# Patient Record
Sex: FEMALE | URBAN - METROPOLITAN AREA
[De-identification: names, ages, dates, MRNs, and addresses within clinical notes are randomized per-mention and may not be internally consistent; named-entity substitution may affect disease eponyms.]

---

## 2015-03-19 RX ORDER — INSULIN LISPRO 100/ML
14 VIAL (ML) SUBCUTANEOUS
Qty: 0 | Refills: 0 | DISCHARGE
Start: 2015-03-19

## 2017-09-07 ENCOUNTER — EMERGENCY (EMERGENCY)
Facility: HOSPITAL | Age: 37
LOS: 1 days | Discharge: HOME | End: 2017-09-07
Attending: EMERGENCY MEDICINE | Admitting: EMERGENCY MEDICINE
Payer: MEDICARE

## 2017-09-07 VITALS
TEMPERATURE: 99 F | RESPIRATION RATE: 17 BRPM | HEART RATE: 66 BPM | DIASTOLIC BLOOD PRESSURE: 72 MMHG | SYSTOLIC BLOOD PRESSURE: 158 MMHG | OXYGEN SATURATION: 98 %

## 2017-09-07 VITALS
SYSTOLIC BLOOD PRESSURE: 162 MMHG | DIASTOLIC BLOOD PRESSURE: 92 MMHG | OXYGEN SATURATION: 97 % | RESPIRATION RATE: 18 BRPM | HEART RATE: 69 BPM

## 2017-09-07 DIAGNOSIS — H40.9 UNSPECIFIED GLAUCOMA: Chronic | ICD-10-CM

## 2017-09-07 DIAGNOSIS — Z98.89 OTHER SPECIFIED POSTPROCEDURAL STATES: Chronic | ICD-10-CM

## 2017-09-07 DIAGNOSIS — L03.113 CELLULITIS OF RIGHT UPPER LIMB: Chronic | ICD-10-CM

## 2017-09-07 DIAGNOSIS — M51.26 OTHER INTERVERTEBRAL DISC DISPLACEMENT, LUMBAR REGION: Chronic | ICD-10-CM

## 2017-09-07 LAB
ALBUMIN SERPL ELPH-MCNC: 3.9 G/DL — SIGNIFICANT CHANGE UP (ref 3.3–5)
ALP SERPL-CCNC: 79 U/L — SIGNIFICANT CHANGE UP (ref 40–120)
ALT FLD-CCNC: 14 U/L RC — SIGNIFICANT CHANGE UP (ref 10–45)
ANION GAP SERPL CALC-SCNC: 17 MMOL/L — SIGNIFICANT CHANGE UP (ref 5–17)
APTT BLD: 43.3 SEC — HIGH (ref 27.5–37.4)
AST SERPL-CCNC: 24 U/L — SIGNIFICANT CHANGE UP (ref 10–40)
BASOPHILS # BLD AUTO: 0 K/UL — SIGNIFICANT CHANGE UP (ref 0–0.2)
BASOPHILS NFR BLD AUTO: 0.6 % — SIGNIFICANT CHANGE UP (ref 0–2)
BILIRUB SERPL-MCNC: 0.2 MG/DL — SIGNIFICANT CHANGE UP (ref 0.2–1.2)
BLD GP AB SCN SERPL QL: NEGATIVE — SIGNIFICANT CHANGE UP
BUN SERPL-MCNC: 41 MG/DL — HIGH (ref 7–23)
CALCIUM SERPL-MCNC: 8.2 MG/DL — LOW (ref 8.4–10.5)
CHLORIDE SERPL-SCNC: 95 MMOL/L — LOW (ref 96–108)
CO2 SERPL-SCNC: 24 MMOL/L — SIGNIFICANT CHANGE UP (ref 22–31)
CREAT SERPL-MCNC: 4.88 MG/DL — HIGH (ref 0.5–1.3)
EOSINOPHIL # BLD AUTO: 0.1 K/UL — SIGNIFICANT CHANGE UP (ref 0–0.5)
EOSINOPHIL NFR BLD AUTO: 0.8 % — SIGNIFICANT CHANGE UP (ref 0–6)
GLUCOSE SERPL-MCNC: 113 MG/DL — HIGH (ref 70–99)
HCT VFR BLD CALC: 29.8 % — LOW (ref 34.5–45)
HGB BLD-MCNC: 10.1 G/DL — LOW (ref 11.5–15.5)
INR BLD: 1.21 RATIO — HIGH (ref 0.88–1.16)
LYMPHOCYTES # BLD AUTO: 1.2 K/UL — SIGNIFICANT CHANGE UP (ref 1–3.3)
LYMPHOCYTES # BLD AUTO: 16.9 % — SIGNIFICANT CHANGE UP (ref 13–44)
MCHC RBC-ENTMCNC: 30.8 PG — SIGNIFICANT CHANGE UP (ref 27–34)
MCHC RBC-ENTMCNC: 33.8 GM/DL — SIGNIFICANT CHANGE UP (ref 32–36)
MCV RBC AUTO: 91.2 FL — SIGNIFICANT CHANGE UP (ref 80–100)
MONOCYTES # BLD AUTO: 0.4 K/UL — SIGNIFICANT CHANGE UP (ref 0–0.9)
MONOCYTES NFR BLD AUTO: 6.5 % — SIGNIFICANT CHANGE UP (ref 2–14)
NEUTROPHILS # BLD AUTO: 5.1 K/UL — SIGNIFICANT CHANGE UP (ref 1.8–7.4)
NEUTROPHILS NFR BLD AUTO: 75.1 % — SIGNIFICANT CHANGE UP (ref 43–77)
PLATELET # BLD AUTO: 182 K/UL — SIGNIFICANT CHANGE UP (ref 150–400)
POTASSIUM SERPL-MCNC: 6 MMOL/L — HIGH (ref 3.5–5.3)
POTASSIUM SERPL-SCNC: 6 MMOL/L — HIGH (ref 3.5–5.3)
PROT SERPL-MCNC: 7.8 G/DL — SIGNIFICANT CHANGE UP (ref 6–8.3)
PROTHROM AB SERPL-ACNC: 13.1 SEC — HIGH (ref 9.8–12.7)
RBC # BLD: 3.27 M/UL — LOW (ref 3.8–5.2)
RBC # FLD: 14.8 % — HIGH (ref 10.3–14.5)
RH IG SCN BLD-IMP: POSITIVE — SIGNIFICANT CHANGE UP
SODIUM SERPL-SCNC: 136 MMOL/L — SIGNIFICANT CHANGE UP (ref 135–145)
WBC # BLD: 6.8 K/UL — SIGNIFICANT CHANGE UP (ref 3.8–10.5)
WBC # FLD AUTO: 6.8 K/UL — SIGNIFICANT CHANGE UP (ref 3.8–10.5)

## 2017-09-07 PROCEDURE — 99284 EMERGENCY DEPT VISIT MOD MDM: CPT | Mod: GC

## 2017-09-07 PROCEDURE — 70450 CT HEAD/BRAIN W/O DYE: CPT | Mod: 26

## 2017-09-07 RX ORDER — LATANOPROST 0.05 MG/ML
1 SOLUTION/ DROPS OPHTHALMIC; TOPICAL AT BEDTIME
Refills: 0 | Status: DISCONTINUED | OUTPATIENT
Start: 2017-09-07 | End: 2017-09-11

## 2017-09-07 RX ORDER — BRIMONIDINE TARTRATE 2 MG/MG
1 SOLUTION/ DROPS OPHTHALMIC ONCE
Refills: 0 | Status: COMPLETED | OUTPATIENT
Start: 2017-09-07 | End: 2017-09-07

## 2017-09-07 RX ORDER — TIMOLOL 0.5 %
1 DROPS OPHTHALMIC (EYE) ONCE
Refills: 0 | Status: DISCONTINUED | OUTPATIENT
Start: 2017-09-07 | End: 2017-09-07

## 2017-09-07 RX ORDER — DORZOLAMIDE HYDROCHLORIDE TIMOLOL MALEATE 20; 5 MG/ML; MG/ML
1 SOLUTION/ DROPS OPHTHALMIC ONCE
Refills: 0 | Status: COMPLETED | OUTPATIENT
Start: 2017-09-07 | End: 2017-09-07

## 2017-09-07 RX ADMIN — DORZOLAMIDE HYDROCHLORIDE TIMOLOL MALEATE 1 DROP(S): 20; 5 SOLUTION/ DROPS OPHTHALMIC at 19:29

## 2017-09-07 RX ADMIN — BRIMONIDINE TARTRATE 1 DROP(S): 2 SOLUTION/ DROPS OPHTHALMIC at 19:29

## 2017-09-07 RX ADMIN — LATANOPROST 1 DROP(S): 0.05 SOLUTION/ DROPS OPHTHALMIC; TOPICAL at 19:30

## 2017-09-07 NOTE — ED ADULT NURSE REASSESSMENT NOTE - NS ED NURSE REASSESS COMMENT FT1
Spoke with  Lena about patient's transport back to her group home. She called the group home to alert them pt will be returning and there is a nonemergent ambulance coming for transport in approx 2 hours. Pt is resting in stretcher on her phone in NAD

## 2017-09-07 NOTE — ED PROVIDER NOTE - MEDICAL DECISION MAKING DETAILS
Attending MD Mcmahan: 37 year old, history of diabetes, esrd on dialysis, glaucoma. woke this morning with darkness in her upper field of vision. describes the sensation and "seeing blood dripping down her left eye. history of blind in right eye from glaucoma. denies severe eye pain, injection, or double  vision. denies additional neuro complaints.   Blind in left eye, peripheral vision intact right eye 20/200.  No pain with EOM.  Plan:  rule out vitreous hemorrhage vs. retinal detachment. Optho consult.

## 2017-09-07 NOTE — ED ADULT NURSE NOTE - PMH
Abscess    Anemia    Back pain    CKD (chronic kidney disease)  creat ~2 as per pt  Diabetes    Glaucoma    Hypertension

## 2017-09-07 NOTE — ED PROVIDER NOTE - ATTENDING CONTRIBUTION TO CARE
Attending MD Mcmahan:  I personally have seen and examined this patient.  Resident note reviewed and agree on plan of care and except where noted.  See MDM for details.

## 2017-09-07 NOTE — ED PROVIDER NOTE - PHYSICAL EXAMINATION
Meri: A & O x 3, NAD, HEENT with right eye pressure 64, right eye vision for light only, left eye pressure 23 and vision for shapes only, visual fields intact on exam; lungs CTAB, heart with reg rhythm; abdomen soft NTND; extremities with left leg amputation and staples still in skin; skin with no rashes, neuro exam cn2 as described above, otherwise non focal with no motor or sensory deficits

## 2017-09-07 NOTE — CONSULT NOTE ADULT - ASSESSMENT
A/P: pending        Follow-Up:  Patient should follow up his/her ophthalmologist or in the [facility] within 1 week of discharge.  [address] [place] [address]  [phone]    S/D/W Dr Murray (attending) A/P: 37 F   #Vitreous hemorrhage OS likely 2/2 diabetic retinopathy vs old vascular occlusion. No tear/RD on exam and Bscan.  - HOB elevation, limit activity  - BP/BG control  - f/u clinic tomorrow    #NLP OD 2/2 glc   - denies pain  - monitor    Follow-Up:  Patient should follow up his/her ophthalmologist or in the [facility] Op[facility]mology Practice tomorrow  [address] [place] [address]  [phone]

## 2017-09-07 NOTE — ED PROVIDER NOTE - OBJECTIVE STATEMENT
37 year old, history of diabetes, esrd on dialysis, glaucoma. woke this morning with darkness in her upper field of vision. 37 year old, history of diabetes, esrd on dialysis, glaucoma. woke this morning with darkness in her upper field of vision. describes the sensation and "seeing blood dripping down her left eye. history of blind in right eye from glaucoma. denies severe eye pain, injection, or double  vision. denies additional neuro complaints    doesn't remember her Ophthalmologist name

## 2017-09-07 NOTE — ED ADULT NURSE NOTE - OBJECTIVE STATEMENT
pt biba from [facility] in [place] with c/o partial left eye vision loss with burning pain that began yesterday.  pt is a diabetic and has retinopathy causing blindness in her right eye.  pt is also a left BKA associated with phantom pain.  pt is awake, alert and responsive to all stimuli.  no sob or respiratory distress noted.  pt resting in bed awaiting md mendez.  will continue to monitor.

## 2017-09-07 NOTE — ED ADULT NURSE NOTE - PSH
Abscess of arm, right  and abscess of mons pubis, s/p I and D  Glaucoma    Herniated lumbar intervertebral disc    History of

## 2017-09-07 NOTE — ED PROVIDER NOTE - PROGRESS NOTE DETAILS
Optho evaluted pt and found her to have vitreous hemorhage. Recommended DC, HOB elevation instructions, pt will f/u in optho clinic tomorrow.

## 2017-09-07 NOTE — ED ADULT NURSE REASSESSMENT NOTE - NS ED NURSE REASSESS COMMENT FT1
Pt. is back from CT. Pt. is resting on stretcher, listening to music. Easy work of breathing. VSS. Safety maintained. Will continue to monitor.

## 2017-09-07 NOTE — CONSULT NOTE ADULT - SUBJECTIVE AND OBJECTIVE BOX
[facility] Ophthalmology Consult Note    HPI: 37 F hx of DM, HTN, ESRD, poor vision for many years OD 2/2 glc s/p tube. Presents with "drops of blood" in her vision in the left eye since this morning. Denies pain. Denies any recent changes in right eye.       PMH: DM, HTN, ESRD  Meds: Reviewed  POcHx (including surgeries/lasers/trauma): Severe glc OD w/ tube, long standing decreased vision OD  Drops: None  FamHx: None  Social Hx: None  Allergies: NKDA    ROS:  General (neg), Vision (per HPI), Head and Neck (neg), Pulm (neg), CV (neg), GI (neg),  (neg), Musculoskeletal (neg), Skin/Integ (neg), Neuro (neg), Endocrine (neg), Heme (neg), All/Immuno (neg)    Mood and Affect Appropriate ( x ),  Oriented to Time, Place, and Person x 3 ( x )    Ophthalmology Exam    Visual acuity (sc): NLP OD, 20/200 OS PHNI  Pupils: +APD OD, R+R OS  Ttono: 42 OD, 21 OS  Extraocular movements (EOMs): Full OU, no pain, no diplopia   Confrontational Visual Field (CVF):  unable OD, Full OS    Pen Light Exam (PLE)  External:  Flat OU  Lids/Lashes/Lacrimal Ducts: Flat OU    Sclera/Conjunctiva:  shunt sup-temporally OD, W+Q OS  Cornea: Cl OU, no edema  Anterior Chamber: tube in place OD, D+F OS, no hyphema  Iris:  Flat OU, no NVI OU  Lens:  NS OD, cl OS    Fundus Exam: dilated with 1% tropicamide and 2.5% phenylephrine  Approval obtained from primary team for dilation  Patient aware that pupils can remained dilated for at least 4-6 hours  Exam performed with 20D lens    Vitreous: wnl OU  Disc, cup/disc: sharp and pink, 0.4 OU  Macula:  wnl OU  Vessels:  wnl OU  Periphery: wnl OU [facility] Ophthalmology Consult Note    HPI: 37 F hx of DM, HTN, ESRD, poor vision for many years OD 2/2 glc s/p tube. Presents with "drops of blood" in her vision in the left eye since this morning. Denies pain. Denies any recent changes in right eye.       PMH: DM, HTN, ESRD  Meds: Reviewed  POcHx (including surgeries/lasers/trauma): Severe glc OD w/ tube, long standing decreased vision OD  Drops: None  FamHx: None  Social Hx: None  Allergies: NKDA    ROS:  General (neg), Vision (per HPI), Head and Neck (neg), Pulm (neg), CV (neg), GI (neg),  (neg), Musculoskeletal (neg), Skin/Integ (neg), Neuro (neg), Endocrine (neg), Heme (neg), All/Immuno (neg)    Mood and Affect Appropriate ( x ),  Oriented to Time, Place, and Person x 3 ( x )    Ophthalmology Exam    Visual acuity (sc): NLP OD, 20/200 OS PHNI  Pupils: +APD OD, R+R OS  Ttono: 42 OD, 21 OS  Extraocular movements (EOMs): Full OU, no pain, no diplopia   Confrontational Visual Field (CVF):  unable OD, Full OS    Pen Light Exam (PLE)  External:  Flat OU  Lids/Lashes/Lacrimal Ducts: Flat OU    Sclera/Conjunctiva:  shunt sup-temporally OD, W+Q OS  Cornea: Cl OU, no edema  Anterior Chamber: tube in place OD, D+F OS, no hyphema  Iris:  Flat OU, no NVI OU  Lens:  NS OD, cl OS    Fundus Exam: dilated with 1% tropicamide and 2.5% phenylephrine  Approval obtained from primary team for dilation  Patient aware that pupils can remained dilated for at least 4-6 hours  Exam performed with 20D lens    Vitreous: wnl OD, vitreous hemorrhage OS  Disc, cup/disc: pale nerve OD, pink and sharp OS  Macula:  flat OD, obscured by preretinal hemorrhage  Vessels:  wnl OD, sclerotic appearing vessel inf-nasal OS  Periphery: fibrosis extending from nerve OD, scattered dbh's OS, no obvious tear/RD    Bscan OS: preretinal heme inferiorly, no RD

## 2017-09-08 ENCOUNTER — APPOINTMENT (OUTPATIENT)
Dept: OPHTHALMOLOGY | Facility: CLINIC | Age: 37
End: 2017-09-08

## 2017-12-08 ENCOUNTER — APPOINTMENT (OUTPATIENT)
Dept: OPHTHALMOLOGY | Facility: CLINIC | Age: 37
End: 2017-12-08

## 2018-01-22 ENCOUNTER — APPOINTMENT (OUTPATIENT)
Dept: OPHTHALMOLOGY | Facility: CLINIC | Age: 38
End: 2018-01-22

## 2018-02-23 ENCOUNTER — APPOINTMENT (OUTPATIENT)
Dept: OPHTHALMOLOGY | Facility: CLINIC | Age: 38
End: 2018-02-23

## 2018-03-26 ENCOUNTER — INPATIENT (INPATIENT)
Facility: HOSPITAL | Age: 38
LOS: 0 days | Discharge: HOME | End: 2018-03-27
Attending: INTERNAL MEDICINE | Admitting: INTERNAL MEDICINE
Payer: MEDICARE

## 2018-03-26 VITALS
DIASTOLIC BLOOD PRESSURE: 123 MMHG | RESPIRATION RATE: 18 BRPM | HEART RATE: 80 BPM | TEMPERATURE: 98 F | OXYGEN SATURATION: 100 % | SYSTOLIC BLOOD PRESSURE: 218 MMHG

## 2018-03-26 DIAGNOSIS — I10 ESSENTIAL (PRIMARY) HYPERTENSION: ICD-10-CM

## 2018-03-26 DIAGNOSIS — M51.26 OTHER INTERVERTEBRAL DISC DISPLACEMENT, LUMBAR REGION: Chronic | ICD-10-CM

## 2018-03-26 DIAGNOSIS — N18.6 END STAGE RENAL DISEASE: ICD-10-CM

## 2018-03-26 DIAGNOSIS — E87.5 HYPERKALEMIA: ICD-10-CM

## 2018-03-26 DIAGNOSIS — N19 UNSPECIFIED KIDNEY FAILURE: ICD-10-CM

## 2018-03-26 DIAGNOSIS — H40.9 UNSPECIFIED GLAUCOMA: Chronic | ICD-10-CM

## 2018-03-26 DIAGNOSIS — Z98.89 OTHER SPECIFIED POSTPROCEDURAL STATES: Chronic | ICD-10-CM

## 2018-03-26 DIAGNOSIS — E83.39 OTHER DISORDERS OF PHOSPHORUS METABOLISM: ICD-10-CM

## 2018-03-26 DIAGNOSIS — L03.113 CELLULITIS OF RIGHT UPPER LIMB: Chronic | ICD-10-CM

## 2018-03-26 LAB
ALBUMIN SERPL ELPH-MCNC: 4.2 G/DL — SIGNIFICANT CHANGE UP (ref 3.3–5)
ALP SERPL-CCNC: 86 U/L — SIGNIFICANT CHANGE UP (ref 40–120)
ALT FLD-CCNC: 13 U/L — SIGNIFICANT CHANGE UP (ref 4–33)
AMMONIA BLD-MCNC: 25 UMOL/L — SIGNIFICANT CHANGE UP (ref 11–55)
APTT BLD: 41.3 SEC — HIGH (ref 27.5–37.4)
AST SERPL-CCNC: 12 U/L — SIGNIFICANT CHANGE UP (ref 4–32)
BASE EXCESS BLDV CALC-SCNC: -1 MMOL/L — SIGNIFICANT CHANGE UP
BASE EXCESS BLDV CALC-SCNC: 0.7 MMOL/L — SIGNIFICANT CHANGE UP
BASOPHILS # BLD AUTO: 0.04 K/UL — SIGNIFICANT CHANGE UP (ref 0–0.2)
BASOPHILS NFR BLD AUTO: 0.3 % — SIGNIFICANT CHANGE UP (ref 0–2)
BILIRUB SERPL-MCNC: 0.3 MG/DL — SIGNIFICANT CHANGE UP (ref 0.2–1.2)
BLOOD GAS VENOUS - CREATININE: 10.4 MG/DL — HIGH (ref 0.5–1.3)
BLOOD GAS VENOUS - CREATININE: 9.84 MG/DL — HIGH (ref 0.5–1.3)
BUN SERPL-MCNC: 41 MG/DL — HIGH (ref 7–23)
BUN SERPL-MCNC: 90 MG/DL — HIGH (ref 7–23)
CALCIUM SERPL-MCNC: 7.9 MG/DL — LOW (ref 8.4–10.5)
CALCIUM SERPL-MCNC: 8.2 MG/DL — LOW (ref 8.4–10.5)
CHLORIDE BLDV-SCNC: 94 MMOL/L — LOW (ref 96–108)
CHLORIDE BLDV-SCNC: 96 MMOL/L — SIGNIFICANT CHANGE UP (ref 96–108)
CHLORIDE SERPL-SCNC: 90 MMOL/L — LOW (ref 98–107)
CHLORIDE SERPL-SCNC: 91 MMOL/L — LOW (ref 98–107)
CK MB BLD-MCNC: 2.4 — SIGNIFICANT CHANGE UP (ref 0–2.5)
CK MB BLD-MCNC: 3.93 NG/ML — SIGNIFICANT CHANGE UP (ref 1–4.7)
CK MB BLD-MCNC: 4.62 NG/ML — SIGNIFICANT CHANGE UP (ref 1–4.7)
CK SERPL-CCNC: 166 U/L — SIGNIFICANT CHANGE UP (ref 25–170)
CK SERPL-CCNC: 217 U/L — HIGH (ref 25–170)
CO2 SERPL-SCNC: 22 MMOL/L — SIGNIFICANT CHANGE UP (ref 22–31)
CO2 SERPL-SCNC: 28 MMOL/L — SIGNIFICANT CHANGE UP (ref 22–31)
CREAT SERPL-MCNC: 5.37 MG/DL — HIGH (ref 0.5–1.3)
CREAT SERPL-MCNC: 9.66 MG/DL — HIGH (ref 0.5–1.3)
EOSINOPHIL # BLD AUTO: 0.18 K/UL — SIGNIFICANT CHANGE UP (ref 0–0.5)
EOSINOPHIL NFR BLD AUTO: 1.4 % — SIGNIFICANT CHANGE UP (ref 0–6)
GAS PNL BLDV: 134 MMOL/L — LOW (ref 136–146)
GAS PNL BLDV: 135 MMOL/L — LOW (ref 136–146)
GLUCOSE BLDV-MCNC: 191 — HIGH (ref 70–99)
GLUCOSE BLDV-MCNC: 192 — HIGH (ref 70–99)
GLUCOSE SERPL-MCNC: 189 MG/DL — HIGH (ref 70–99)
GLUCOSE SERPL-MCNC: 211 MG/DL — HIGH (ref 70–99)
HBV SURFACE AG SER-ACNC: NEGATIVE — SIGNIFICANT CHANGE UP
HBV SURFACE AG SER-ACNC: NEGATIVE — SIGNIFICANT CHANGE UP
HCG SERPL-ACNC: < 5 MIU/ML — SIGNIFICANT CHANGE UP
HCO3 BLDV-SCNC: 22 MMOL/L — SIGNIFICANT CHANGE UP (ref 20–27)
HCO3 BLDV-SCNC: 23 MMOL/L — SIGNIFICANT CHANGE UP (ref 20–27)
HCT VFR BLD CALC: 36.3 % — SIGNIFICANT CHANGE UP (ref 34.5–45)
HCT VFR BLDV CALC: 35.4 % — SIGNIFICANT CHANGE UP (ref 34.5–45)
HCT VFR BLDV CALC: 44.9 % — SIGNIFICANT CHANGE UP (ref 34.5–45)
HGB BLD-MCNC: 11.7 G/DL — SIGNIFICANT CHANGE UP (ref 11.5–15.5)
HGB BLDV-MCNC: 11.5 G/DL — SIGNIFICANT CHANGE UP (ref 11.5–15.5)
HGB BLDV-MCNC: 14.6 G/DL — SIGNIFICANT CHANGE UP (ref 11.5–15.5)
IMM GRANULOCYTES # BLD AUTO: 0.04 # — SIGNIFICANT CHANGE UP
IMM GRANULOCYTES NFR BLD AUTO: 0.3 % — SIGNIFICANT CHANGE UP (ref 0–1.5)
INR BLD: 1.09 — SIGNIFICANT CHANGE UP (ref 0.88–1.17)
LACTATE BLDV-MCNC: 1.4 MMOL/L — SIGNIFICANT CHANGE UP (ref 0.5–2)
LACTATE BLDV-MCNC: 1.8 MMOL/L — SIGNIFICANT CHANGE UP (ref 0.5–2)
LYMPHOCYTES # BLD AUTO: 1.42 K/UL — SIGNIFICANT CHANGE UP (ref 1–3.3)
LYMPHOCYTES # BLD AUTO: 11.1 % — LOW (ref 13–44)
MAGNESIUM SERPL-MCNC: 2.3 MG/DL — SIGNIFICANT CHANGE UP (ref 1.6–2.6)
MCHC RBC-ENTMCNC: 29.1 PG — SIGNIFICANT CHANGE UP (ref 27–34)
MCHC RBC-ENTMCNC: 32.2 % — SIGNIFICANT CHANGE UP (ref 32–36)
MCV RBC AUTO: 90.3 FL — SIGNIFICANT CHANGE UP (ref 80–100)
MONOCYTES # BLD AUTO: 0.38 K/UL — SIGNIFICANT CHANGE UP (ref 0–0.9)
MONOCYTES NFR BLD AUTO: 3 % — SIGNIFICANT CHANGE UP (ref 2–14)
NEUTROPHILS # BLD AUTO: 10.71 K/UL — HIGH (ref 1.8–7.4)
NEUTROPHILS NFR BLD AUTO: 83.9 % — HIGH (ref 43–77)
NRBC # FLD: 0 — SIGNIFICANT CHANGE UP
PCO2 BLDV: 55 MMHG — HIGH (ref 41–51)
PCO2 BLDV: 57 MMHG — HIGH (ref 41–51)
PH BLDV: 7.28 PH — LOW (ref 7.32–7.43)
PH BLDV: 7.29 PH — LOW (ref 7.32–7.43)
PHOSPHATE SERPL-MCNC: 8.4 MG/DL — HIGH (ref 2.5–4.5)
PLATELET # BLD AUTO: 232 K/UL — SIGNIFICANT CHANGE UP (ref 150–400)
PMV BLD: 9.8 FL — SIGNIFICANT CHANGE UP (ref 7–13)
PO2 BLDV: 35 MMHG — SIGNIFICANT CHANGE UP (ref 35–40)
PO2 BLDV: 37 MMHG — SIGNIFICANT CHANGE UP (ref 35–40)
POTASSIUM BLDV-SCNC: 6.8 MMOL/L — CRITICAL HIGH (ref 3.4–4.5)
POTASSIUM BLDV-SCNC: 7.7 MMOL/L — CRITICAL HIGH (ref 3.4–4.5)
POTASSIUM SERPL-MCNC: 4 MMOL/L — SIGNIFICANT CHANGE UP (ref 3.5–5.3)
POTASSIUM SERPL-MCNC: 7.2 MMOL/L — CRITICAL HIGH (ref 3.5–5.3)
POTASSIUM SERPL-SCNC: 4 MMOL/L — SIGNIFICANT CHANGE UP (ref 3.5–5.3)
POTASSIUM SERPL-SCNC: 7.2 MMOL/L — CRITICAL HIGH (ref 3.5–5.3)
PROT SERPL-MCNC: 8 G/DL — SIGNIFICANT CHANGE UP (ref 6–8.3)
PROTHROM AB SERPL-ACNC: 12.5 SEC — SIGNIFICANT CHANGE UP (ref 9.8–13.1)
RBC # BLD: 4.02 M/UL — SIGNIFICANT CHANGE UP (ref 3.8–5.2)
RBC # FLD: 13.7 % — SIGNIFICANT CHANGE UP (ref 10.3–14.5)
SAO2 % BLDV: 55.2 % — LOW (ref 60–85)
SAO2 % BLDV: 57.7 % — LOW (ref 60–85)
SODIUM SERPL-SCNC: 135 MMOL/L — SIGNIFICANT CHANGE UP (ref 135–145)
SODIUM SERPL-SCNC: 135 MMOL/L — SIGNIFICANT CHANGE UP (ref 135–145)
TROPONIN T SERPL-MCNC: 0.15 NG/ML — HIGH (ref 0–0.06)
TROPONIN T SERPL-MCNC: 0.15 NG/ML — HIGH (ref 0–0.06)
WBC # BLD: 12.77 K/UL — HIGH (ref 3.8–10.5)
WBC # FLD AUTO: 12.77 K/UL — HIGH (ref 3.8–10.5)

## 2018-03-26 PROCEDURE — 99222 1ST HOSP IP/OBS MODERATE 55: CPT | Mod: GC

## 2018-03-26 PROCEDURE — 99291 CRITICAL CARE FIRST HOUR: CPT

## 2018-03-26 PROCEDURE — 70450 CT HEAD/BRAIN W/O DYE: CPT | Mod: 26

## 2018-03-26 PROCEDURE — 71045 X-RAY EXAM CHEST 1 VIEW: CPT | Mod: 26

## 2018-03-26 RX ORDER — CHLORHEXIDINE GLUCONATE 213 G/1000ML
1 SOLUTION TOPICAL
Refills: 0 | Status: DISCONTINUED | OUTPATIENT
Start: 2018-03-26 | End: 2018-03-27

## 2018-03-26 RX ORDER — DEXTROSE 50 % IN WATER 50 %
1 SYRINGE (ML) INTRAVENOUS ONCE
Refills: 0 | Status: DISCONTINUED | OUTPATIENT
Start: 2018-03-26 | End: 2018-03-27

## 2018-03-26 RX ORDER — OXYCODONE HYDROCHLORIDE 5 MG/1
30 TABLET ORAL ONCE
Refills: 0 | Status: DISCONTINUED | OUTPATIENT
Start: 2018-03-26 | End: 2018-03-26

## 2018-03-26 RX ORDER — ACETAMINOPHEN 500 MG
650 TABLET ORAL ONCE
Refills: 0 | Status: DISCONTINUED | OUTPATIENT
Start: 2018-03-26 | End: 2018-03-26

## 2018-03-26 RX ORDER — ATORVASTATIN CALCIUM 80 MG/1
20 TABLET, FILM COATED ORAL AT BEDTIME
Refills: 0 | Status: DISCONTINUED | OUTPATIENT
Start: 2018-03-26 | End: 2018-03-27

## 2018-03-26 RX ORDER — SODIUM CHLORIDE 9 MG/ML
1000 INJECTION, SOLUTION INTRAVENOUS
Refills: 0 | Status: DISCONTINUED | OUTPATIENT
Start: 2018-03-26 | End: 2018-03-27

## 2018-03-26 RX ORDER — OXCARBAZEPINE 300 MG/1
300 TABLET, FILM COATED ORAL
Refills: 0 | Status: DISCONTINUED | OUTPATIENT
Start: 2018-03-26 | End: 2018-03-27

## 2018-03-26 RX ORDER — INSULIN HUMAN 100 [IU]/ML
5 INJECTION, SOLUTION SUBCUTANEOUS ONCE
Refills: 0 | Status: COMPLETED | OUTPATIENT
Start: 2018-03-26 | End: 2018-03-26

## 2018-03-26 RX ORDER — AMLODIPINE BESYLATE 2.5 MG/1
10 TABLET ORAL DAILY
Refills: 0 | Status: DISCONTINUED | OUTPATIENT
Start: 2018-03-26 | End: 2018-03-27

## 2018-03-26 RX ORDER — SODIUM POLYSTYRENE SULFONATE 4.1 MEQ/G
30 POWDER, FOR SUSPENSION ORAL ONCE
Refills: 0 | Status: COMPLETED | OUTPATIENT
Start: 2018-03-26 | End: 2018-03-26

## 2018-03-26 RX ORDER — MIDAZOLAM HYDROCHLORIDE 1 MG/ML
2 INJECTION, SOLUTION INTRAMUSCULAR; INTRAVENOUS ONCE
Refills: 0 | Status: DISCONTINUED | OUTPATIENT
Start: 2018-03-26 | End: 2018-03-26

## 2018-03-26 RX ORDER — CALCIUM ACETATE 667 MG
2001 TABLET ORAL
Refills: 0 | Status: DISCONTINUED | OUTPATIENT
Start: 2018-03-26 | End: 2018-03-27

## 2018-03-26 RX ORDER — DEXTROSE 50 % IN WATER 50 %
50 SYRINGE (ML) INTRAVENOUS ONCE
Refills: 0 | Status: COMPLETED | OUTPATIENT
Start: 2018-03-26 | End: 2018-03-26

## 2018-03-26 RX ORDER — DEXTROSE 50 % IN WATER 50 %
25 SYRINGE (ML) INTRAVENOUS ONCE
Refills: 0 | Status: DISCONTINUED | OUTPATIENT
Start: 2018-03-26 | End: 2018-03-27

## 2018-03-26 RX ORDER — DEXTROSE 50 % IN WATER 50 %
12.5 SYRINGE (ML) INTRAVENOUS ONCE
Refills: 0 | Status: DISCONTINUED | OUTPATIENT
Start: 2018-03-26 | End: 2018-03-27

## 2018-03-26 RX ORDER — SODIUM BICARBONATE 1 MEQ/ML
50 SYRINGE (ML) INTRAVENOUS ONCE
Refills: 0 | Status: COMPLETED | OUTPATIENT
Start: 2018-03-26 | End: 2018-03-26

## 2018-03-26 RX ORDER — HEPARIN SODIUM 5000 [USP'U]/ML
5000 INJECTION INTRAVENOUS; SUBCUTANEOUS EVERY 8 HOURS
Refills: 0 | Status: DISCONTINUED | OUTPATIENT
Start: 2018-03-26 | End: 2018-03-27

## 2018-03-26 RX ORDER — GLUCAGON INJECTION, SOLUTION 0.5 MG/.1ML
1 INJECTION, SOLUTION SUBCUTANEOUS ONCE
Refills: 0 | Status: DISCONTINUED | OUTPATIENT
Start: 2018-03-26 | End: 2018-03-27

## 2018-03-26 RX ORDER — INSULIN LISPRO 100/ML
VIAL (ML) SUBCUTANEOUS EVERY 6 HOURS
Refills: 0 | Status: DISCONTINUED | OUTPATIENT
Start: 2018-03-26 | End: 2018-03-27

## 2018-03-26 RX ORDER — ACETAMINOPHEN 500 MG
650 TABLET ORAL EVERY 6 HOURS
Refills: 0 | Status: DISCONTINUED | OUTPATIENT
Start: 2018-03-26 | End: 2018-03-27

## 2018-03-26 RX ORDER — CALCIUM GLUCONATE 100 MG/ML
2 VIAL (ML) INTRAVENOUS ONCE
Refills: 0 | Status: COMPLETED | OUTPATIENT
Start: 2018-03-26 | End: 2018-03-26

## 2018-03-26 RX ORDER — METOPROLOL TARTRATE 50 MG
100 TABLET ORAL
Refills: 0 | Status: DISCONTINUED | OUTPATIENT
Start: 2018-03-26 | End: 2018-03-27

## 2018-03-26 RX ADMIN — Medication 50 MILLILITER(S): at 13:52

## 2018-03-26 RX ADMIN — Medication 50 MILLIEQUIVALENT(S): at 13:52

## 2018-03-26 RX ADMIN — ATORVASTATIN CALCIUM 20 MILLIGRAM(S): 80 TABLET, FILM COATED ORAL at 22:11

## 2018-03-26 RX ADMIN — Medication 200 MILLIGRAM(S): at 20:22

## 2018-03-26 RX ADMIN — Medication 100 MILLIGRAM(S): at 20:25

## 2018-03-26 RX ADMIN — SODIUM POLYSTYRENE SULFONATE 30 GRAM(S): 4.1 POWDER, FOR SUSPENSION ORAL at 20:26

## 2018-03-26 RX ADMIN — Medication 200 GRAM(S): at 14:35

## 2018-03-26 RX ADMIN — INSULIN HUMAN 5 UNIT(S): 100 INJECTION, SOLUTION SUBCUTANEOUS at 13:51

## 2018-03-26 RX ADMIN — HEPARIN SODIUM 5000 UNIT(S): 5000 INJECTION INTRAVENOUS; SUBCUTANEOUS at 22:11

## 2018-03-26 RX ADMIN — MIDAZOLAM HYDROCHLORIDE 2 MILLIGRAM(S): 1 INJECTION, SOLUTION INTRAMUSCULAR; INTRAVENOUS at 13:28

## 2018-03-26 RX ADMIN — OXYCODONE HYDROCHLORIDE 30 MILLIGRAM(S): 5 TABLET ORAL at 20:27

## 2018-03-26 RX ADMIN — OXCARBAZEPINE 300 MILLIGRAM(S): 300 TABLET, FILM COATED ORAL at 20:27

## 2018-03-26 RX ADMIN — OXYCODONE HYDROCHLORIDE 30 MILLIGRAM(S): 5 TABLET ORAL at 19:58

## 2018-03-26 NOTE — H&P ADULT - ATTENDING COMMENTS
37 year old woman with diabetes, ESRD presented to ED with altered mental status found to be hyperkalemic admitted to MICU for acute HD

## 2018-03-26 NOTE — CONSULT NOTE ADULT - PROBLEM SELECTOR RECOMMENDATION 4
in the setting of ESRD. Recommend to continue phoslo. Monitor serum calcium and phosphorous levels. Give low phosphorous diet.

## 2018-03-26 NOTE — CONSULT NOTE ADULT - PROBLEM SELECTOR RECOMMENDATION 9
ESRD on HD. Pt last outpatient HD on Saturday. Pt currently with AMS possibly secondary to uremia. Pt also with hyperkalemia of 7.2 with EKG changes. Plan for urgent HD today ESRD on HD. Pt last outpatient HD was on Saturday (as per patient). Pt currently with AMS possibly secondary to uremia in the setting of elevated BUN. Pt also with hyperkalemia of 7.2 with EKG changes. Plan for urgent HD today. Will arrange for maintenance HD tomorrow. Monitor BMP.

## 2018-03-26 NOTE — H&P ADULT - NSHPPHYSICALEXAM_GEN_ALL_CORE
General: lethargic, NAD, lying in bed  Neurology: A&Ox1, nonfocal  Head:  Normocephalic, atraumatic  ENT:  Mucosa moist, no ulcerations  Neck:  Supple, no sinuses or palpable masses  Lymphatic:  No palpable cervical, supraclavicular adenopathy  Respiratory: CTA B/L  CV: RRR, S1S2, no murmur  Abdominal: Soft, NT, ND no palpable mass  MSK: No edema, + peripheral pulses, +L BKA, bilateral LE tenderness

## 2018-03-26 NOTE — H&P ADULT - HISTORY OF PRESENT ILLNESS
36yo f pmh left BKA, ESRD on HD, DM, HTN, presents with altered mental status. Pt was called as a rapid response at [facility] today while visiting her daughter. [facility] staff noticed today that she seemed more lethargic and slow to answer questions. Pt unable to answer questions in meaningful way at this time. Pt complaining of abdominal pain.  AOx1. Pt states last HD on Monday during RRT but on arrival to MICU says last HD Saturday. 38yo F pmh left BKA, ESRD on HD T/Th/Sat, DM, HTN, presents with altered mental status. Pt was called as a rapid response at [facility] today while visiting her daughter. [facility] staff noticed today that she seemed more lethargic and slow to answer questions. Pt unable to answer questions in meaningful way at this time. Pt complaining of abdominal pain. AOx1. Pt states last HD 3/24 as per Dr. Garcia, outpt nephrologist. As per nephrologist, pt commonly has hyperkalemia and is noncompliant with diet. Dr. Garcia also explains that pt was recently hospitalized at [facility] for AMS as well which was attributed to opioids as someone was prescribing them to her as an outpt ([place] MD) ISTOP Reference #: [id number].

## 2018-03-26 NOTE — CONSULT NOTE ADULT - ASSESSMENT
38yo f with AMS and EKG changes likely 2/2 hyperuremia and need for HD.     CV: ekg changes 2/2 hyperkalemia, already received hyper K cocktail, pt requires telemetry monitoring + urgent HD   Resp: stable on room air   GI: pt complains of mild abdominal pain, non tender on exam, will reevaluate s/p HD, consider CT   : no acute issues   SKIN: no e/o breakdown   neuro: AMS will reassess s/p HD.
37 year old female pmh left BKA, ESRD on HD TTS, DM, HTN, admitted with altered mental status found to have hyperkalemia. Nephrology consulted for urgent HD in the setting of hyperkalemia of 7.2 with EKG changes.

## 2018-03-26 NOTE — ED ADULT TRIAGE NOTE - CHIEF COMPLAINT QUOTE
Arrives via Rapid Response.  Pt not slow to respond to questions.  Hx of ESRF with left arm graft, last dialysis Saturday, legally blind, LBKA.  FIT called for stroke evaluation.  No stroke called.  Pt noted to have flat affect.  CSW endorses that pt is usually alert, readily responsive "more focus".  Would answer questions, but not appropriate to questions.  Fs 162 mg dl

## 2018-03-26 NOTE — ED ADULT NURSE NOTE - OBJECTIVE STATEMENT
received pt A&Ox3 in no apparent distress at this time. #20g IVL to R upper ext placed by US team, bloods drawn and sent to the lab. no s/s of infiltration noted at this time. MD at bedside. vss. cardiac monitor in place. dispo pending

## 2018-03-26 NOTE — CONSULT NOTE ADULT - PROBLEM SELECTOR RECOMMENDATION 3
Pt with elevated BP. Plan for UF with HD. Monitor BP closely Pt with elevated BP. Plan for UF with HD today. Monitor BP.

## 2018-03-26 NOTE — ED PROVIDER NOTE - CARE PLAN
Principal Discharge DX:	Uremia  Secondary Diagnosis:	Hyperkalemia  Secondary Diagnosis:	Altered mental status

## 2018-03-26 NOTE — H&P ADULT - FAMILY HISTORY
Sibling  Still living? Yes, Estimated age: Age Unknown  Family history of diabetes mellitus (DM), Age at diagnosis: Age Unknown  Family history of renal failure, Age at diagnosis: Age Unknown

## 2018-03-26 NOTE — ED PROVIDER NOTE - OBJECTIVE STATEMENT
37 year old, history of diabetes, esrd on dialysis, glaucoma, left BKA presents to ED w/ AMS. Pt. was rapid response at [facility] today. Pt. is at [facility] for her daughter - but staff noticed today that she seemed more lethargic and slow to answer questions. Pt. slow to answer questions and poor historian. complaining of pain to left leg (history BKA) and vague abdominal pain intermittently.  A & O x 2. Unable to obtain further history from patient.

## 2018-03-26 NOTE — ED PROVIDER NOTE - ATTENDING CONTRIBUTION TO CARE
Guy: 46 yo female with a h/o ESR on HD, DM, left BKA brought to the ED after she was a rapid repsonse at [facility] where her daughter is a patient. Pt had AMS but exact history is unclear. I called peds team taking care of her daughter and they were unable to provide nay additional history other then she is normally more alert and talkative. Pt c/o left lower extremity pain but unable to provide any further history. Exam: chronically ill appearing, NAD, AxOx2- oriented to person and time but not place. +S1/S2, lunga CTA, abdomen is soft and nontender. +heal left BKA. AxOx2, awake but slow to respond and unable to provide much history. Pt confused. speech is clear and moving all extremities but not following commands. + upper extremity tremors. A/P- 46 yo female with AMS will obtain labs, CT head, CXR, rectal temp and reassess. Guy: 44 yo female with a h/o ESR on HD, DM, left BKA brought to the ED after she was a rapid repsonse at [facility] where her daughter is a patient. Pt had AMS but exact history is unclear. I called peds team taking care of her daughter and they were unable to provide nay additional history other then she is normally more alert and talkative. Pt c/o left lower extremity pain but unable to provide any further history. Exam: chronically ill appearing, NAD, AxOx2- oriented to person and time but not place. +S1/S2, lunga CTA, abdomen is soft and nontender. +heal left BKA. AxOx2, awake but slow to respond and unable to provide much history. Pt confused. speech is clear and moving all extremities but not following commands. +asterixis. A/P- 44 yo female with AMS will obtain labs, CT head, CXR, rectal temp and reassess.

## 2018-03-26 NOTE — ED PROVIDER NOTE - PROGRESS NOTE DETAILS
Guy: Pt rectally afebrile. will continue to reassess. Guy: Pt required sedation for CT since she refused to lay down but is confused. versed given with good response. CT now complete will continue to reassess. Guy: Pt required sedation for CT since she refused to lay down but is confused. versed given with good response. CT now complete will continue to reassess. CMP delayed by lab since broken machine. multiple calls to lab to expedite HD. nephrology paged. Guy: code dialysis called by nephrology to expedite HD and MICU admission. Guy: Pt evaluated by MICU and nephrology, awaiting dispo, repeat EKG improved after hyperkalemia treatment. now with only peaked T waves. MICU aware. repeat VBG ordered will continue to reassess. Accepted for admission to MICU.

## 2018-03-26 NOTE — CONSULT NOTE ADULT - PROBLEM SELECTOR RECOMMENDATION 2
Pt with elevated serum potassium to 7.2 with EKG changes. Pt received medical management in ER. Plan for urgent HD today Pt with elevated serum potassium to 7.2 with EKG changes, in the setting of ESRD and enalapril use. Pt received medical management in ER. Recommend to hold enalapril, and give kayexalate 30 grams stat. Plan for urgent HD today. Monitor serum potassium. Give low potassium diet.

## 2018-03-26 NOTE — PROVIDER CONTACT NOTE (OTHER) - BACKGROUND
Pt brought to ED by estefani Harrington P# [id number].  Pt is 36yo female who was with her daughter in [facility] when she was not answering questions properly.

## 2018-03-26 NOTE — ED PROVIDER NOTE - MEDICAL DECISION MAKING DETAILS
36 y/o female w/ AMS  -possibel metabolic vs infectious vs vascular in nature  -labs ct head cxr ekg  -probable admit 36 y/o female w/ AMS  -possibel metabolic vs infectious vs vascular in nature will obtain labs, CT head, rectal temp and reassess.   -labs ct head cxr ekg  -probable admit 36 y/o female w/ AMS  -possible metabolic vs infectious vs vascular in nature will obtain labs, CT head, rectal temp and reassess.   -labs ct head cxr ekg  -probable admit

## 2018-03-26 NOTE — PROVIDER CONTACT NOTE (OTHER) - ASSESSMENT
Pt is legally blind and on dialysis, DM.  Pt attend dialysis T-TH-S at [facility] dialysis [phone] Jennifer SW.  Pt usually has HHA 7hrs/day x 7 days/wk.  Pt was with dtr at [facility] when she was not clearly answering questions  [facility] brought her to ED.  Pt had a brother that would assist with dtr's care at home but  within the past year.  Pt has a sister that is supposed to assist pt at home but the assistance is questionable.

## 2018-03-26 NOTE — H&P ADULT - NSHPLABSRESULTS_GEN_ALL_CORE
Comprehensive Metabolic Panel (03.26.18 @ 11:18)    Sodium, Serum: 135 mmol/L    Potassium, Serum: 7.2 mmol/L    Chloride, Serum: 90: Delta: 104 on 03/18/  Delta: 104 on 03/18/ mmol/L    Carbon Dioxide, Serum: 22 mmol/L    Blood Urea Nitrogen, Serum: 90 mg/dL    Creatinine, Serum: 9.66 mg/dL    Glucose, Serum: 189 mg/dL    Complete Blood Count + Automated Diff (03.26.18 @ 11:18)    Nucleated RBC #: 0    WBC Count: 12.77 K/uL    RBC Count: 4.02 M/uL    Hemoglobin: 11.7 g/dL    Hematocrit: 36.3 %    Mean Cell Volume: 90.3 fL    Mean Cell Hemoglobin: 29.1 pg    Mean Cell Hemoglobin Conc: 32.2 %    Red Cell Distrib Width: 13.7 %    Platelet Count - Automated: 232 K/uL    MPV: 9.8 fl    Auto Neutrophil #: 10.71 K/uL    Auto Lymphocyte #: 1.42 K/uL    Auto Monocyte #: 0.38 K/uL    Auto Eosinophil #: 0.18 K/uL    Auto Basophil #: 0.04 K/uL    Auto Immature Granulocyte #: 0.04: (Includes meta, myelo and promyelocytes) #    Auto Neutrophil %: 83.9 %    Auto Lymphocyte %: 11.1 %    Auto Monocyte %: 3.0 %    Auto Eosinophil %: 1.4 %    Auto Basophil %: 0.3 %    Auto Immature Granulocyte %: 0.3: (Includes meta, myelo and promyelocytes) %    Blood Gas Venous Comprehensive (03.26.18 @ 15:04)    Blood Gas Venous - Lactate: 1.4: Please note updated reference range. mmol/L    Blood Gas Venous - Chloride: 94 mmol/L    Blood Gas Venous - Creatinine: 10.40 mg/dL    pH, Venous: 7.29 pH    pCO2, Venous: 57 mmHg    pO2, Venous: 35 mmHg    HCO3, Venous: 23 mmol/L    Base Excess, Venous: 0.7: REFERENCE RANGE = -3 + 2 mmol/L mmol/L    Oxygen Saturation, Venous: 55.2 %    Blood Gas Venous - Sodium: 135 mmol/L    Blood Gas Venous - Potassium: 7.7 mmol/L    Blood Gas Venous - Glucose: 191    Blood Gas Venous - Hemoglobin: 11.5: Delta: 14.6 on 03/26/  Delta: 14.6 on 03/26/ g/dL    Blood Gas Venous - Hematocrit: 35.4: Delta: 44.9 on 03/26/  Delta: 44.9 on 03/26/ %      Troponin T, Serum (03.26.18 @ 11:18)    Troponin T, Serum: 0.15:   RESULT CALLED TO: DARNELL OLSON    DATE / TIME CALLED: 03/26/18 1433  CALLED BY: BRANDI BRADFORD  03/26/18 1500:  TROPONIN-T previously reported as: 0.15  H ng/mL    RESULT CALLED TO:  DATE / TIME CALLED: 03/26/18 1433  CALLED BY: BRANDI BRADFORD  INCLUDESTHE 99th PERCENTILE OF A HEALTHY  POPULATION AT A  METHOD C.V. OF 10% OR LESS.    TROPONIN T IS MEASURED BY THE ROCHE ELECSYS ECLIA METHOD. ng/mL  Xray Chest 1 View- PORTABLE-Urgent (03.26.18 @ 13:49) >    EXAM:  XR CHEST PORTABLE URGENT 1V      PROCEDURE DATE:  Mar 26 2018     INTERPRETATION:  CLINICAL INDICATION: altered mental status    EXAM:  Portable upright AP chest from 3/26/2018 at 1349. Compared to prior study   from 3/2/2015.    Projection kyphotic.     IMPRESSION:  Markedly underinflated but otherwise grossly clear lungs. No pleural   effusions or pneumothorax.  Heart size and mediastinal width inaccurately assessed on this projection.  Trachea midline.  Unremarkable osseous structures.    EKG: Peaked T waves in leads I, II and V3-V6.

## 2018-03-26 NOTE — H&P ADULT - ASSESSMENT
38yo f with T1DM c/b L BKA, blindness p/w AMS and EKG changes likely 2/2 hyperkalemia and need for HD, likely due to missed HD sessions.    #Neuro  - A&Ox1-2, likely 2/2 uremic encephalopathy  - urgent HD   - monitor mental status     #CV  - EKG changes 2/2 hyperkalemia,   - Pt received hyperkalemia cocktail  - Pt requiring telemetry monitoring   - Urgent HD     #Respiratory  - Stable on RA    #GI  - Pt complains of mild abdominal pain, non tender on exam, will reevaluate s/p HD  - Consider CT scan if not improving    #  - No active issues    #Endo  - Hx of T1DM  - Pt on novolog 14units q8 at home    #Skin  - No breakdown     #Neuro:   - AMS will reassess s/p HD 38yo f with T1DM c/b L BKA, blindness p/w AMS and EKG changes likely 2/2 hyperkalemia and need for HD, likely due to missed HD sessions.    #Neuro  - A&Ox1-2, likely 2/2 uremic encephalopathy  - urgent HD, reassess mental status after HD  - monitor mental status   - will get urine tox    #CV  - EKG changes 2/2 hyperkalemia,   - Pt received hyperkalemia cocktail  - Pt requiring telemetry monitoring   - Urgent HD     #Respiratory  - Stable on RA    #GI  - Pt complains of mild abdominal pain, non tender on exam, will reevaluate s/p HD  - Consider CT scan if not improving    #  - No active issues    #Endo  - Hx of T1DM  - Pt on novolog 14units q8 at home  - c/w SSI for now    #Skin  - No breakdown 36yo f with T1DM c/b L BKA, blindness p/w AMS and EKG changes likely 2/2 hyperkalemia and need for HD, likely due to missed HD sessions.    #Neuro  - A&Ox1-2, likely 2/2 uremic encephalopathy +/- opioid use  - urgent HD, reassess mental status after HD  - monitor mental status   - will get urine tox    #CV  - EKG changes 2/2 hyperkalemia,   - Pt received hyperkalemia cocktail  - Pt requiring telemetry monitoring   - Urgent HD   - c/w norvasc, enalapril and lopressor for HTN    #Respiratory  - Stable on RA    #GI  - Pt complains of mild abdominal pain, non tender on exam, will reevaluate s/p HD  - Consider CT scan if not improving    #  - No active issues    #Endo  - Hx of T1DM  - Pt on novolog 14units q8 at home  - c/w SSI for now    #Skin  - No breakdown 38yo f with T1DM c/b L BKA, blindness p/w AMS and EKG changes likely 2/2 hyperkalemia and need for HD.    #Neuro  - A&Ox1-2, likely 2/2 uremic encephalopathy +/- opioid use  - urgent HD, reassess mental status after HD  - monitor mental status   - will get urine tox    #CV  - EKG changes 2/2 hyperkalemia,   - Pt received hyperkalemia cocktail  - Pt requiring telemetry monitoring   - Urgent HD   - c/w norvasc, enalapril and lopressor for HTN    #Respiratory  - Stable on RA    #GI  - Pt complains of mild abdominal pain, non tender on exam, will reevaluate s/p HD  - Consider CT scan if not improving    #  - No active issues    #Endo  - Hx of T1DM  - Pt on novolog 14units q8 at home  - c/w SSI for now    #Skin  - No breakdown 36yo f with T1DM c/b L BKA, blindness p/w AMS and EKG changes likely 2/2 hyperkalemia and need for HD.    #Neuro  - A&Ox1-2, likely 2/2 uremic encephalopathy +/- opioid use  - urgent HD, reassess mental status after HD  - monitor mental status   - will get urine tox    #CV  - EKG changes w/ peaked T waves 2/2 hyperkalemia  - Pt requiring telemetry monitoring   - Urgent HD   - c/w norvasc, enalapril and lopressor for HTN    #Respiratory  - Stable on RA    #GI  - Pt complains of mild abdominal pain, non tender on exam, will reevaluate s/p HD  - Consider CT scan if not improving    #  - No active issues    #Endo  - Hx of T1DM  - Pt on novolog 14units q8 at home  - c/w SSI for now    #Skin  - No breakdown 36yo f with T1DM c/b L BKA, blindness, ESRD on HD T/Th/Sat, p/w AMS and EKG changes likely 2/2 uremia, hyperkalemia requiring urgent HD.    #Neuro  - A&Ox1-2, likely 2/2 uremic encephalopathy +/- opioid use  - urgent HD, reassess mental status after HD  - monitor mental status   - will get urine tox    #CV  - EKG changes w/ peaked T waves 2/2 hyperkalemia s/p hyperkalemia cocktail  - Urgent HD  - Pt requiring telemetry monitoring   - Elevated troponins, f/u repeat values  - c/w norvasc, enalapril and lopressor for HTN    #Respiratory  - Stable on RA    #GI  - Pt complains of mild abdominal pain, non tender on exam, will reevaluate s/p HD  - Consider CT scan if not improving    #  - No active issues    #Endo  - Hx of T1DM  - Pt on novolog 14units q8 at home  - c/w SSI for now as pt NPO    #DVT ppx  - SQH

## 2018-03-27 ENCOUNTER — TRANSCRIPTION ENCOUNTER (OUTPATIENT)
Age: 38
End: 2018-03-27

## 2018-03-27 VITALS
OXYGEN SATURATION: 100 % | HEART RATE: 81 BPM | DIASTOLIC BLOOD PRESSURE: 83 MMHG | SYSTOLIC BLOOD PRESSURE: 150 MMHG | RESPIRATION RATE: 24 BRPM

## 2018-03-27 LAB
ALBUMIN SERPL ELPH-MCNC: 3.6 G/DL — SIGNIFICANT CHANGE UP (ref 3.3–5)
ALP SERPL-CCNC: 81 U/L — SIGNIFICANT CHANGE UP (ref 40–120)
ALT FLD-CCNC: 45 U/L — HIGH (ref 4–33)
AST SERPL-CCNC: 48 U/L — HIGH (ref 4–32)
BASOPHILS # BLD AUTO: 0.04 K/UL — SIGNIFICANT CHANGE UP (ref 0–0.2)
BASOPHILS NFR BLD AUTO: 0.5 % — SIGNIFICANT CHANGE UP (ref 0–2)
BILIRUB SERPL-MCNC: 0.4 MG/DL — SIGNIFICANT CHANGE UP (ref 0.2–1.2)
BUN SERPL-MCNC: 46 MG/DL — HIGH (ref 7–23)
CALCIUM SERPL-MCNC: 8 MG/DL — LOW (ref 8.4–10.5)
CHLORIDE SERPL-SCNC: 89 MMOL/L — LOW (ref 98–107)
CO2 SERPL-SCNC: 25 MMOL/L — SIGNIFICANT CHANGE UP (ref 22–31)
CREAT SERPL-MCNC: 5.85 MG/DL — HIGH (ref 0.5–1.3)
EOSINOPHIL # BLD AUTO: 0.14 K/UL — SIGNIFICANT CHANGE UP (ref 0–0.5)
EOSINOPHIL NFR BLD AUTO: 1.6 % — SIGNIFICANT CHANGE UP (ref 0–6)
GLUCOSE BLDC GLUCOMTR-MCNC: 150 MG/DL — HIGH (ref 70–99)
GLUCOSE BLDC GLUCOMTR-MCNC: 175 MG/DL — HIGH (ref 70–99)
GLUCOSE BLDC GLUCOMTR-MCNC: 195 MG/DL — HIGH (ref 70–99)
GLUCOSE SERPL-MCNC: 191 MG/DL — HIGH (ref 70–99)
HBA1C BLD-MCNC: 7.7 % — HIGH (ref 4–5.6)
HBA1C BLD-MCNC: SIGNIFICANT CHANGE UP % (ref 4–5.6)
HBV CORE AB SER-ACNC: REACTIVE — SIGNIFICANT CHANGE UP
HBV SURFACE AB SER-ACNC: 31.9 MLU/ML — SIGNIFICANT CHANGE UP
HCT VFR BLD CALC: 35.6 % — SIGNIFICANT CHANGE UP (ref 34.5–45)
HCV AB S/CO SERPL IA: 0.2 S/CO — SIGNIFICANT CHANGE UP
HCV AB SERPL-IMP: SIGNIFICANT CHANGE UP
HGB BLD-MCNC: 11.2 G/DL — LOW (ref 11.5–15.5)
IMM GRANULOCYTES # BLD AUTO: 0.03 # — SIGNIFICANT CHANGE UP
IMM GRANULOCYTES NFR BLD AUTO: 0.4 % — SIGNIFICANT CHANGE UP (ref 0–1.5)
LYMPHOCYTES # BLD AUTO: 1.28 K/UL — SIGNIFICANT CHANGE UP (ref 1–3.3)
LYMPHOCYTES # BLD AUTO: 15.1 % — SIGNIFICANT CHANGE UP (ref 13–44)
MAGNESIUM SERPL-MCNC: 2.2 MG/DL — SIGNIFICANT CHANGE UP (ref 1.6–2.6)
MCHC RBC-ENTMCNC: 29.2 PG — SIGNIFICANT CHANGE UP (ref 27–34)
MCHC RBC-ENTMCNC: 31.5 % — LOW (ref 32–36)
MCV RBC AUTO: 92.7 FL — SIGNIFICANT CHANGE UP (ref 80–100)
MONOCYTES # BLD AUTO: 0.4 K/UL — SIGNIFICANT CHANGE UP (ref 0–0.9)
MONOCYTES NFR BLD AUTO: 4.7 % — SIGNIFICANT CHANGE UP (ref 2–14)
NEUTROPHILS # BLD AUTO: 6.6 K/UL — SIGNIFICANT CHANGE UP (ref 1.8–7.4)
NEUTROPHILS NFR BLD AUTO: 77.7 % — HIGH (ref 43–77)
NRBC # FLD: 0 — SIGNIFICANT CHANGE UP
PHOSPHATE SERPL-MCNC: 5.8 MG/DL — HIGH (ref 2.5–4.5)
PLATELET # BLD AUTO: 197 K/UL — SIGNIFICANT CHANGE UP (ref 150–400)
PMV BLD: 9.6 FL — SIGNIFICANT CHANGE UP (ref 7–13)
POTASSIUM SERPL-MCNC: 5.1 MMOL/L — SIGNIFICANT CHANGE UP (ref 3.5–5.3)
POTASSIUM SERPL-SCNC: 5.1 MMOL/L — SIGNIFICANT CHANGE UP (ref 3.5–5.3)
PROT SERPL-MCNC: 7.4 G/DL — SIGNIFICANT CHANGE UP (ref 6–8.3)
RBC # BLD: 3.84 M/UL — SIGNIFICANT CHANGE UP (ref 3.8–5.2)
RBC # FLD: 13.8 % — SIGNIFICANT CHANGE UP (ref 10.3–14.5)
SODIUM SERPL-SCNC: 132 MMOL/L — LOW (ref 135–145)
WBC # BLD: 8.49 K/UL — SIGNIFICANT CHANGE UP (ref 3.8–10.5)
WBC # FLD AUTO: 8.49 K/UL — SIGNIFICANT CHANGE UP (ref 3.8–10.5)

## 2018-03-27 PROCEDURE — 99233 SBSQ HOSP IP/OBS HIGH 50: CPT | Mod: GC

## 2018-03-27 PROCEDURE — 99232 SBSQ HOSP IP/OBS MODERATE 35: CPT | Mod: GC

## 2018-03-27 RX ORDER — OXYCODONE HYDROCHLORIDE 5 MG/1
10 TABLET ORAL ONCE
Refills: 0 | Status: DISCONTINUED | OUTPATIENT
Start: 2018-03-27 | End: 2018-03-27

## 2018-03-27 RX ORDER — OXYCODONE HYDROCHLORIDE 5 MG/1
30 TABLET ORAL EVERY 6 HOURS
Refills: 0 | Status: DISCONTINUED | OUTPATIENT
Start: 2018-03-27 | End: 2018-03-27

## 2018-03-27 RX ADMIN — OXCARBAZEPINE 300 MILLIGRAM(S): 300 TABLET, FILM COATED ORAL at 05:26

## 2018-03-27 RX ADMIN — Medication 200 MILLIGRAM(S): at 05:25

## 2018-03-27 RX ADMIN — OXYCODONE HYDROCHLORIDE 30 MILLIGRAM(S): 5 TABLET ORAL at 12:00

## 2018-03-27 RX ADMIN — Medication 2001 MILLIGRAM(S): at 12:26

## 2018-03-27 RX ADMIN — Medication 2001 MILLIGRAM(S): at 08:20

## 2018-03-27 RX ADMIN — HEPARIN SODIUM 5000 UNIT(S): 5000 INJECTION INTRAVENOUS; SUBCUTANEOUS at 05:30

## 2018-03-27 RX ADMIN — Medication 1: at 00:20

## 2018-03-27 RX ADMIN — Medication 1: at 05:31

## 2018-03-27 RX ADMIN — OXYCODONE HYDROCHLORIDE 10 MILLIGRAM(S): 5 TABLET ORAL at 06:15

## 2018-03-27 RX ADMIN — Medication 100 MILLIGRAM(S): at 06:51

## 2018-03-27 RX ADMIN — CHLORHEXIDINE GLUCONATE 1 APPLICATION(S): 213 SOLUTION TOPICAL at 11:16

## 2018-03-27 RX ADMIN — OXYCODONE HYDROCHLORIDE 30 MILLIGRAM(S): 5 TABLET ORAL at 11:15

## 2018-03-27 RX ADMIN — OXYCODONE HYDROCHLORIDE 10 MILLIGRAM(S): 5 TABLET ORAL at 05:25

## 2018-03-27 RX ADMIN — AMLODIPINE BESYLATE 10 MILLIGRAM(S): 2.5 TABLET ORAL at 05:27

## 2018-03-27 NOTE — DISCHARGE NOTE ADULT - PLAN OF CARE
You were found to have electrolyte abnormalities causing you to have mental status and EKG changes on presentation. You were emergently dialyzed. Please continue with your dialysis regimen and follow up with Dr. Garcia as an outpatient. You have a history of diabetes for which you are on insulin at home. Please continue to take your insulin as prescribed. Please follow up with your primary care doctor for management of your diabetes. Continuation of dialysis HbA1c < 7

## 2018-03-27 NOTE — CHART NOTE - NSCHARTNOTEFT_GEN_A_CORE
Pt alert and oriented x3 this AM, much improved from yesterday, currently at baseline. Pt has full decision making capacity. Will likely be discharged later today.    Bear Pastrana MD  MICU Intern

## 2018-03-27 NOTE — PROGRESS NOTE ADULT - SUBJECTIVE AND OBJECTIVE BOX
[facility] DIVISION OF KIDNEY DISEASES AND HYPERTENSION -- FOLLOW UP NOTE  --------------------------------------------------------------------------------    HPI: 37 year old female pmh left BKA, ESRD on HD TTS, DM, HTN, admitted with altered mental status found to have hyperkalemia. Pt was called as a rapid response at [facility] today while visiting her daughter. Nephrology consulted for urgent HD in the setting of hyperkalemia of 7.2 with EKG changes on 3/26. Pt states she has been on HD for two years and her last outpatient dialysis was on Saturday. Pt outpatient nephrologist is Dr. Goldberg in [place]. Pt states she is lives at home with her daughter and her daughter is currently admitted to [facility]. Pt had Urgent HD for hyperkalemia on 3/26 which was tolerated well. Pt denies any recent illness and admits to poor compliance with renal diet. Pt seen and examined in MICU this AM. Improved mental status. Denies CP, SOB or LE edema.     PAST HISTORY  --------------------------------------------------------------------------------  No significant changes to PMH, PSH, FHx, SHx, unless otherwise noted    ALLERGIES & MEDICATIONS  --------------------------------------------------------------------------------  Allergies    No Known Allergies    Intolerances      Standing Inpatient Medications  amLODIPine   Tablet 10 milliGRAM(s) Oral daily  atorvastatin 20 milliGRAM(s) Oral at bedtime  calcium acetate 2001 milliGRAM(s) Oral three times a day with meals  chlorhexidine 4% Liquid 1 Application(s) Topical <User Schedule>  dextrose 5%. 1000 milliLiter(s) IV Continuous <Continuous>  dextrose 50% Injectable 12.5 Gram(s) IV Push once  dextrose 50% Injectable 25 Gram(s) IV Push once  dextrose 50% Injectable 25 Gram(s) IV Push once  heparin  Injectable 5000 Unit(s) SubCutaneous every 8 hours  insulin lispro (HumaLOG) corrective regimen sliding scale   SubCutaneous every 6 hours  metoprolol tartrate 100 milliGRAM(s) Oral two times a day  OXcarbazepine 300 milliGRAM(s) Oral two times a day  pregabalin 200 milliGRAM(s) Oral two times a day    PRN Inpatient Medications  acetaminophen   Tablet. 650 milliGRAM(s) Oral every 6 hours PRN  dextrose Gel 1 Dose(s) Oral once PRN  glucagon  Injectable 1 milliGRAM(s) IntraMuscular once PRN      REVIEW OF SYSTEMS  --------------------------------------------------------------------------------  Gen: No weakness  Skin: No rashes  Head/Eyes/Ears/Mouth: No headache  Respiratory: No dyspnea  CV: No chest pain, PND, orthopnea  GI: No abdominal pain, diarrhea  : No increased frequency  MSK: No edema  Neuro: No dizziness/lightheadedness  Heme: No bleeding    All other systems were reviewed and are negative, except as noted.    VITALS/PHYSICAL EXAM  --------------------------------------------------------------------------------  T(C): 36.1 (03-27-18 @ 04:00), Max: 37 (03-26-18 @ 11:30)  HR: 66 (03-27-18 @ 07:00) (63 - 80)  BP: 149/130 (03-27-18 @ 07:00) (98/35 - 218/123)  RR: 13 (03-27-18 @ 07:00) (10 - 21)  SpO2: 100% (03-27-18 @ 07:00) (96% - 100%)  Wt(kg): --  Height (cm): 167.64 (03-26-18 @ 16:00)  Weight (kg): 95.4 (03-26-18 @ 16:00)  BMI (kg/m2): 33.9 (03-26-18 @ 16:00)  BSA (m2): 2.04 (03-26-18 @ 16:00)      03-26-18 @ 07:01  -  03-27-18 @ 07:00  --------------------------------------------------------  IN: 700 mL / OUT: 1100 mL / NET: -400 mL    Physical Exam:  	Gen: NAD  	HEENT: supple neck   	Pulm: CTA B/L  	CV: RRR, S1S2  	Abd: +BS, soft, nontender/nondistended  	UE: Warm, no asterixis  	LE: Warm, no edema  	Skin: Warm, without rashes  	Vascular access: + LUE AVF + thrill + bruit, skin intact     LABS/STUDIES  --------------------------------------------------------------------------------              11.2   8.49  >-----------<  197      [03-27-18 @ 04:30]              35.6     132  |  89  |  46  ----------------------------<  191      [03-27-18 @ 04:30]  5.1   |  25  |  5.85        Ca     8.0     [03-27-18 @ 04:30]      Mg     2.2     [03-27-18 @ 04:30]      Phos  5.8     [03-27-18 @ 04:30]    TPro  7.4  /  Alb  3.6  /  TBili  0.4  /  DBili  x   /  AST  48  /  ALT  45  /  AlkPhos  81  [03-27-18 @ 04:30]    PT/INR: PT 12.5 , INR 1.09       [03-26-18 @ 11:33]  PTT: 41.3       [03-26-18 @ 11:33]    Troponin 0.15      [03-26-18 @ 22:30]        [03-26-18 @ 22:30]    Creatinine Trend:  SCr 5.85 [03-27 @ 04:30]  SCr 5.37 [03-26 @ 22:30]  SCr 9.66 [03-26 @ 11:18]    HbA1c 12.2      [02-25-15 @ 10:35]    HBsAb 31.9      [03-26-18 @ 16:50]  HBsAg NEGATIVE      [03-26-18 @ 16:50]  HBcAb Reactive      [03-26-18 @ 16:50]  HCV 0.20, Nonreactive Hepatitis C AB  S/CO Ratio                        Interpretation  < 1.0                                     Non-Reactive  1.0 - 4.9                           Weakly-Reactive  > 5.0                                 Reactive  Non-Reactive: Aperson with a non-reactive HCV antibody  result is considered uninfected.  No further action is  needed unless recent infection is suspected.  In these  cases, consider repeat testing later to detect  seroconversion..  Weakly-Reactive: HCV antibody test is abnormal, HCV RNA  Qualitative test will follow.  Reactive: HCV antibody test is abnormal, HCV RNA  Qualitative test will follow.  Note: HCV antibody testing is performed on the [facility] system.      [03-26-18 @ 16:50] [facility] DIVISION OF KIDNEY DISEASES AND HYPERTENSION -- FOLLOW UP NOTE  --------------------------------------------------------------------------------  HPI: 37-year-old female with left BKA, ESRD on HD TTS, DM, HTN, admitted with altered mental status found to have hyperkalemia. Pt. was called for a rapid response at [facility] yesterday while visiting her daughter. Nephrology consulted for urgent HD in the setting of hyperkalemia of 7.2 with EKG changes on 3/26. Pt states she has been on HD for two years and her last outpatient dialysis was on Saturday. Pt.'s outpatient nephrologist is Dr. Goldberg in [place]. Pt. states she is lives at home with her daughter and her daughter is currently admitted to [facility]. Pt had urgent HD for hyperkalemia yesterday (3/26) which was tolerated well. Pt. denies any recent illness and admits to poor compliance with renal diet. Pt seen and examined in MICU this AM. Improved mental status. Denies CP, SOB or LE edema.     PAST HISTORY  --------------------------------------------------------------------------------  No significant changes to PMH, PSH, FHx, SHx, unless otherwise noted    ALLERGIES & MEDICATIONS  --------------------------------------------------------------------------------  Allergies    No Known Allergies    Intolerances    Standing Inpatient Medications  amLODIPine   Tablet 10 milliGRAM(s) Oral daily  atorvastatin 20 milliGRAM(s) Oral at bedtime  calcium acetate 2001 milliGRAM(s) Oral three times a day with meals  chlorhexidine 4% Liquid 1 Application(s) Topical <User Schedule>  dextrose 5%. 1000 milliLiter(s) IV Continuous <Continuous>  dextrose 50% Injectable 12.5 Gram(s) IV Push once  dextrose 50% Injectable 25 Gram(s) IV Push once  dextrose 50% Injectable 25 Gram(s) IV Push once  heparin  Injectable 5000 Unit(s) SubCutaneous every 8 hours  insulin lispro (HumaLOG) corrective regimen sliding scale   SubCutaneous every 6 hours  metoprolol tartrate 100 milliGRAM(s) Oral two times a day  OXcarbazepine 300 milliGRAM(s) Oral two times a day  pregabalin 200 milliGRAM(s) Oral two times a day    PRN Inpatient Medications  acetaminophen   Tablet. 650 milliGRAM(s) Oral every 6 hours PRN  dextrose Gel 1 Dose(s) Oral once PRN  glucagon  Injectable 1 milliGRAM(s) IntraMuscular once PRN    REVIEW OF SYSTEMS  --------------------------------------------------------------------------------  Gen: No weakness  Head/Eyes/Ears/Mouth: No headache  Respiratory: No dyspnea  CV: No chest pain  GI: No abdominal pain, diarrhea  : No increased frequency  MSK: No edema  Neuro: No dizziness  Heme: No bleeding    All other systems were reviewed and are negative, except as noted.    VITALS/PHYSICAL EXAM  --------------------------------------------------------------------------------  T(C): 36.1 (03-27-18 @ 04:00), Max: 37 (03-26-18 @ 11:30)  HR: 66 (03-27-18 @ 07:00) (63 - 80)  BP: 149/130 (03-27-18 @ 07:00) (98/35 - 218/123)  RR: 13 (03-27-18 @ 07:00) (10 - 21)  SpO2: 100% (03-27-18 @ 07:00) (96% - 100%)  Wt(kg): --  Height (cm): 167.64 (03-26-18 @ 16:00)  Weight (kg): 95.4 (03-26-18 @ 16:00)  BMI (kg/m2): 33.9 (03-26-18 @ 16:00)  BSA (m2): 2.04 (03-26-18 @ 16:00)    03-26-18 @ 07:01  -  03-27-18 @ 07:00  --------------------------------------------------------  IN: 700 mL / OUT: 1100 mL / NET: -400 mL    Physical Exam:  	Gen: resting   	HEENT: supple neck   	Pulm: CTA B/L  	CV: RRR, S1S2  	Abd: +BS, soft, nontender  	UE: Warm, no asterixis  	LE: Warm, no edema  	Skin: Warm, without rashes  	Vascular access: + LUE AVF + thrill + bruit, skin intact     LABS/STUDIES  --------------------------------------------------------------------------------              11.2   8.49  >-----------<  197      [03-27-18 @ 04:30]              35.6     132  |  89  |  46  ----------------------------<  191      [03-27-18 @ 04:30]  5.1   |  25  |  5.85        Ca     8.0     [03-27-18 @ 04:30]      Mg     2.2     [03-27-18 @ 04:30]      Phos  5.8     [03-27-18 @ 04:30]    TPro  7.4  /  Alb  3.6  /  TBili  0.4  /  DBili  x   /  AST  48  /  ALT  45  /  AlkPhos  81  [03-27-18 @ 04:30]    Troponin 0.15      [03-26-18 @ 22:30]        [03-26-18 @ 22:30]    Creatinine Trend:  SCr 5.85 [03-27 @ 04:30]  SCr 5.37 [03-26 @ 22:30]  SCr 9.66 [03-26 @ 11:18]    HbA1c 12.2      [02-25-15 @ 10:35]    HBsAb 31.9      [03-26-18 @ 16:50]  HBsAg NEGATIVE      [03-26-18 @ 16:50]  HBcAb Reactive      [03-26-18 @ 16:50]  HCV 0.20, Nonreactive Hepatitis C AB  S/CO Ratio                        Interpretation  < 1.0                                     Non-Reactive  1.0 - 4.9                           Weakly-Reactive  > 5.0                                 Reactive  Non-Reactive: Aperson with a non-reactive HCV antibody  result is considered uninfected.  No further action is  needed unless recent infection is suspected.  In these  cases, consider repeat testing later to detect  seroconversion..  Weakly-Reactive: HCV antibody test is abnormal, HCV RNA  Qualitative test will follow.  Reactive: HCV antibody test is abnormal, HCV RNA  Qualitative test will follow.  Note: HCV antibody testing is performed on the [facility] system.      [03-26-18 @ 16:50]

## 2018-03-27 NOTE — PROGRESS NOTE ADULT - ASSESSMENT
37 year old female pmh left BKA, ESRD on HD TTS, DM, HTN, admitted with altered mental status found to have hyperkalemia. Nephrology consulted for urgent HD in the setting of hyperkalemia of 7.2 with EKG changes. 37-year-old female with left BKA, ESRD on HD TTS, DM, HTN, admitted with altered mental status found to have hyperkalemia. Nephrology consulted for urgent HD in the setting of hyperkalemia of 7.2 with EKG changes. Pt. underwent urgent HD yesterday.

## 2018-03-27 NOTE — PROGRESS NOTE ADULT - ATTENDING COMMENTS
37 year old woman type one diabetic s/p left dka has neuropathic pain ESRD on HD admitted for emergent HD yesterday     awake and alert today complaining of her chronic leg pain  planned for repeat HD today  no active ID or cardiac issues   can be discharged after HD   patient's 12 year old [facility] and she wants to get back to her

## 2018-03-27 NOTE — DISCHARGE NOTE ADULT - HOSPITAL COURSE
38yo F pmh left BKA, ESRD on HD T/Th/Sat, DM, HTN, presents with altered mental status. Pt was called as a rapid response at [facility] today while visiting her daughter. [facility] staff noticed today that she seemed more lethargic and slow to answer questions. Pt unable to answer questions in meaningful way at this time. Pt complaining of abdominal pain. AOx1. Pt states last HD 3/24 as per Dr. Garcia, outpt nephrologist. As per nephrologist, pt commonly has hyperkalemia and is noncompliant with diet. Dr. Garcia also explains that pt was recently hospitalized at [facility] for AMS as well which was attributed to opioids as someone was prescribing them to her as an outpt ([place] Hodgson MD) I-Stopped pt, was getting 30mg oxycodone q4 at home.    Pt was admitted to the MICU for urgent HD. Pt got HD on 3/26 and HD on 3/27. Pt regained mental status and was back to baseline. Electrolytes back to baseline. Pt's troponins were stable at 0.15, no signs or symptoms of ACS. Spoke with Dr. Hodgson's PA, explained the situation and that oxycodone could have been contributing to lethargy and AMS. Recommend switching oxycodone to 30mg q6-8 PRN for pain instead of q4. Continue with pt's outpt insulin regimen.

## 2018-03-27 NOTE — PROGRESS NOTE ADULT - PROBLEM SELECTOR PLAN 4
Pt with elevated serum phosphorus in the setting of ESRD. Recommend to continue phoslo. Monitor serum calcium and phosphorous levels. Advise low phosphorous diet Pt with elevated serum phosphorus in the setting of ESRD. Recommend to continue Phoslo. Monitor serum calcium and phosphorous levels. Advise low phosphorous diet

## 2018-03-27 NOTE — DISCHARGE NOTE ADULT - CARE PROVIDER_API CALL
Mega Garcia  [address] [place] [address]  Phone: [phone]  Fax: (   )    -    L[place] Hodgson  [address] [place] [address]  Phone: [phone]  Fax: (   )    -

## 2018-03-27 NOTE — DISCHARGE NOTE ADULT - PROVIDER TOKENS
FREE:[LAST:[Jose],FIRST:[Mega],PHONE:[phone]],FAX:[(   )    -],ADDRESS:[[address] [place] [address]]],FREE:[LAST:[Gokul],FIRST:[Lake],PHONE:[phone]],FAX:[(   )    -],ADDRESS:[[address] [place] [address]]]

## 2018-03-27 NOTE — DISCHARGE NOTE ADULT - MEDICATION SUMMARY - MEDICATIONS TO TAKE
I will START or STAY ON the medications listed below when I get home from the hospital:    oxyCODONE 30 mg oral tablet  -- 1 tab(s) by mouth every 6 hours, As Needed  -- Indication: For Pain    enalapril 10 mg oral tablet  -- 1 tab(s) by mouth every 8 hours  -- Indication: For Hypertension, unspecified type    OXcarbazepine 300 mg oral tablet  -- 1 tab(s) by mouth 2 times a day  -- Indication: For Pain    Lyrica 100 mg oral capsule  -- 2 cap(s) by mouth 2 times a day  -- Indication: For Pain    insulin lispro  -- 14 unit(s) subcutaneous every 8 hours  -- Indication: For Diabetes    rosuvastatin 5 mg oral tablet  -- 1 tab(s) by mouth once a day (at bedtime)  -- Indication: For Hyperlipidemia    Ambien 5 mg oral tablet  -- 1 tab(s) by mouth once a day (at bedtime)  -- Indication: For Insomnia    metoprolol tartrate 100 mg oral tablet  -- 1 tab(s) by mouth 2 times a day  -- Indication: For Hypertension, unspecified type    amLODIPine 10 mg oral tablet  -- 1 tab(s) by mouth once a day  -- Indication: For Hypertension, unspecified type    calcium acetate 667 mg oral tablet  -- 3 tab(s) by mouth 3 times a day  -- Indication: For ESRD (end stage renal disease) on dialysis I will START or STAY ON the medications listed below when I get home from the hospital:    oxyCODONE 30 mg oral tablet  -- 1 tab(s) by mouth every 6 hours, As Needed  -- Indication: For Pain    enalapril 10 mg oral tablet  -- 1 tab(s) by mouth every 8 hours  -- Indication: For Hypertension, unspecified type    Lyrica 100 mg oral capsule  -- 2 cap(s) by mouth 2 times a day  -- Indication: For Pain    OXcarbazepine 300 mg oral tablet  -- 1 tab(s) by mouth 2 times a day  -- Indication: For Pain    NovoLOG 100 units/mL subcutaneous solution  -- 14 unit(s) subcutaneous 3 times a day (before meals), decrease dose to 10-12units subcutaneous if premeal FSBG < 200  -- Indication: For Diabetes    Lantus 100 units/mL subcutaneous solution  -- 14 unit(s) subcutaneous once a day (at bedtime)  -- Indication: For Diabetes    rosuvastatin 5 mg oral tablet  -- 1 tab(s) by mouth once a day (at bedtime)  -- Indication: For Hyperlipidemia    Ambien 5 mg oral tablet  -- 1 tab(s) by mouth once a day (at bedtime)  -- Indication: For Insomnia    metoprolol tartrate 100 mg oral tablet  -- 1 tab(s) by mouth 2 times a day  -- Indication: For Hypertension, unspecified type    amLODIPine 10 mg oral tablet  -- 1 tab(s) by mouth once a day  -- Indication: For Hypertension, unspecified type    calcium acetate 667 mg oral tablet  -- 3 tab(s) by mouth 3 times a day  -- Indication: For ESRD (end stage renal disease) on dialysis

## 2018-03-27 NOTE — DISCHARGE NOTE ADULT - MEDICATION SUMMARY - MEDICATIONS TO STOP TAKING
I will STOP taking the medications listed below when I get home from the hospital:    insulin lispro  -- 4 unit(s) subcutaneous 3 times a day before meals - NEED TO CONFIRM DOSE    Lantus 100 units/mL subcutaneous solution  -- 14 unit(s) subcutaneous once a day at 4:30PM - NEED TO CONFIRM DOSE    Lyrica 100 mg oral capsule  -- 1 cap(s) by mouth 2 times a day    enalapril 10 mg oral tablet  -- 1 tab(s) by mouth every 8 hours I will STOP taking the medications listed below when I get home from the hospital:    insulin lispro  -- 4 unit(s) subcutaneous 3 times a day before meals - NEED TO CONFIRM DOSE    Lantus 100 units/mL subcutaneous solution  -- 14 unit(s) subcutaneous once a day at 4:30PM - NEED TO CONFIRM DOSE    Lyrica 100 mg oral capsule  -- 1 cap(s) by mouth 2 times a day    enalapril 10 mg oral tablet  -- 1 tab(s) by mouth every 8 hours    insulin lispro  -- 14 unit(s) subcutaneous every 8 hours

## 2018-03-27 NOTE — PROGRESS NOTE ADULT - SUBJECTIVE AND OBJECTIVE BOX
INTERVAL HPI/OVERNIGHT EVENTS:    SUBJECTIVE: Patient seen and examined at bedside. Pt endorsed pain, got tylenol and oxycodone overnight. Troponins stable. Tolerating diet. Continues to endorse LLE phantom pain this AM. Wants to go to see daughter in [place].     OBJECTIVE:    VITAL SIGNS:  ICU Vital Signs Last 24 Hrs  T(C): 36.1 (27 Mar 2018 09:10), Max: 36.3 (26 Mar 2018 16:00)  T(F): 97 (27 Mar 2018 09:10), Max: 97.3 (26 Mar 2018 16:00)  HR: 71 (27 Mar 2018 12:00) (63 - 76)  BP: 169/81 (27 Mar 2018 12:00) (98/35 - 203/90)  BP(mean): 100 (27 Mar 2018 12:00) (52 - 135)  ABP: --  ABP(mean): --  RR: 13 (27 Mar 2018 12:00) (10 - 21)  SpO2: 100% (27 Mar 2018 12:00) (96% - 100%)    03-26 @ 07:01  -  03-27 @ 07:00  --------------------------------------------------------  IN: 700 mL / OUT: 1100 mL / NET: -400 mL    CAPILLARY BLOOD GLUCOSE    POCT Blood Glucose.: 150 mg/dL (27 Mar 2018 12:10)    PHYSICAL EXAM:    General: NAD, sitting comfortably in bed using ipad  Neurology: A&Ox3, nonfocal  Head:  Normocephalic, atraumatic  ENT:  Mucosa moist, no ulcerations	  Neck:  Supple, no sinuses or palpable masses  Lymphatic:  No palpable cervical, supraclavicular adenopathy  Respiratory: CTA B/L  CV: RRR, S1S2, no murmur  Abdominal: Soft, NT, ND no palpable mass  MSK: No edema, + peripheral pulses, +L BKA, bilateral LE tenderness    MEDICATIONS:  MEDICATIONS  (STANDING):  amLODIPine   Tablet 10 milliGRAM(s) Oral daily  atorvastatin 20 milliGRAM(s) Oral at bedtime  calcium acetate 2001 milliGRAM(s) Oral three times a day with meals  chlorhexidine 4% Liquid 1 Application(s) Topical <User Schedule>  dextrose 5%. 1000 milliLiter(s) (50 mL/Hr) IV Continuous <Continuous>  dextrose 50% Injectable 12.5 Gram(s) IV Push once  dextrose 50% Injectable 25 Gram(s) IV Push once  dextrose 50% Injectable 25 Gram(s) IV Push once  heparin  Injectable 5000 Unit(s) SubCutaneous every 8 hours  insulin lispro (HumaLOG) corrective regimen sliding scale   SubCutaneous every 6 hours  metoprolol tartrate 100 milliGRAM(s) Oral two times a day  OXcarbazepine 300 milliGRAM(s) Oral two times a day  oxyCODONE    IR 30 milliGRAM(s) Oral every 6 hours  pregabalin 200 milliGRAM(s) Oral two times a day    MEDICATIONS  (PRN):  acetaminophen   Tablet. 650 milliGRAM(s) Oral every 6 hours PRN Mild-Mod pain  dextrose Gel 1 Dose(s) Oral once PRN Blood Glucose LESS THAN 70 milliGRAM(s)/deciliter  glucagon  Injectable 1 milliGRAM(s) IntraMuscular once PRN Glucose LESS THAN 70 milligrams/deciliter    ALLERGIES:  Allergies    No Known Allergies    Intolerances    LABS:                        11.2   8.49  )-----------( 197      ( 27 Mar 2018 04:30 )             35.6     03-27    132<L>  |  89<L>  |  46<H>  ----------------------------<  191<H>  5.1   |  25  |  5.85<H>    Ca    8.0<L>      27 Mar 2018 04:30  Phos  5.8     03-27  Mg     2.2     03-27    TPro  7.4  /  Alb  3.6  /  TBili  0.4  /  DBili  x   /  AST  48<H>  /  ALT  45<H>  /  AlkPhos  81  03-27    PT/INR - ( 26 Mar 2018 11:33 )   PT: 12.5 SEC;   INR: 1.09          PTT - ( 26 Mar 2018 11:33 )  PTT:41.3 SEC    RADIOLOGY & ADDITIONAL TESTS:     CT Head No Cont (03.26.18 @ 14:22)    EXAM:  CT BRAIN      PROCEDURE DATE:  Mar 26 2018     INTERPRETATION:  HISTORY: Altered mental status.    Technique: Noncontrast CT of the head was performed.    Multiple contiguous axial images were acquired from the skull base to the   vertex without the administration of intravenous contrast. Coronal and   sagittal reformations were made.    COMPARISON: CT head dated 9/7/2017.    FINDINGS:  Mild prominence of the sulci and ventricles are consistent with   age-appropriate volume loss. There are hemispheric white matter areas of   low attenuation which are nonspecific but likely related to sequelae of   microvascular disease. There is no intraparenchymal hematoma, mass effect   or midline shift. No abnormal extra-axial fluid collections are present.   There is no evidence of acute transcortical territorial infarction.    The calvarium is intact. The visualized intraorbital compartments,   paranasal sinuses and tympanomastoid cavities appear free of acute   disease.      IMPRESSION:  No acute intracranial hemorrhage, mass or hydrocephalus. No interval   change.

## 2018-03-27 NOTE — PROGRESS NOTE ADULT - PROBLEM SELECTOR PLAN 2
Pt with elevated serum potassium to 7.2 with EKG changes, in the setting of ESRD and enalapril use. Pt received medical management in ER and urgent HD on 3/26. Serum potassium now improved today. Recommend to hold enalapril. Monitor serum potassium. Advise low potassium diet Pt with elevated serum potassium to 7.2 with EKG changes, in the setting of ESRD and enalapril use. Pt. received medical management in ER and urgent HD on 3/26. Serum potassium now improved today. Recommend to discontinue enalapril. Monitor serum potassium. Advise low potassium diet

## 2018-03-27 NOTE — PROGRESS NOTE ADULT - PROBLEM SELECTOR PLAN 1
ESRD on HD. Pt admitted 3/26 with AMS possibly secondary to uremia in the setting of elevated BUN. Pt also with hyperkalemia of 7.2 with EKG changes. Pt received urgent HD on 3/26 which was tolerated well. Pt with improved mental status today. Plan for Maintenance HD today. Monitor BMP ESRD on HD. Pt. admitted on 3/26 with AMS possibly secondary to uremia in the setting of elevated BUN. Pt. also with hyperkalemia of 7.2 with EKG changes. Pt received urgent HD on 3/26 which was tolerated well. Pt with improved mental status today. Plan for maintenance HD today. Monitor BMP

## 2018-03-27 NOTE — PROGRESS NOTE ADULT - ASSESSMENT
36yo f with T1DM c/b L BKA, blindness, ESRD on HD T/Th/Sat, p/w AMS and EKG changes likely 2/2 uremia, hyperkalemia requiring urgent HD, currently stable, A&Ox3, plan for discharge today.    #Neuro  - A&Ox3 this AM s/p HD  - Will restart oxycodone at 30mg q6, pt was on 30mg q4 at home and her AMS could be 2/2 oversedation  - Restarted trileptal and lyrica for pain    #CV  - Hypertensive to 200s today, restarting BP meds  - Pt receiving HD this AM  - Troponins stable  - C/w norvasc, lopressor for HTN    #Respiratory  - Stable on RA    #GI  - Tolerating PO  - No active issues    #  - Pt anuric at baseline as per pt    #Renal  - Pt receiving HD today  - Electrolytes wnl  - HD T/Th/Sa as outpt    #Endo  - Hx of T1DM  - Pt on novolog 14units q8 at home  - C/w SSI with regular diet    #DVT ppx  - SQH

## 2018-03-27 NOTE — DISCHARGE NOTE ADULT - MEDICATION SUMMARY - MEDICATIONS TO CHANGE
I will SWITCH the dose or number of times a day I take the medications listed below when I get home from the hospital:    oxyCODONE 30 mg oral tablet  -- 1 tab(s) by mouth every 4 hours, As Needed

## 2018-03-27 NOTE — DISCHARGE NOTE ADULT - PATIENT PORTAL LINK FT
You can access the [facility] Patient Portal, offered by [facility], by registering with the following website: [email or web address]

## 2018-03-29 ENCOUNTER — INPATIENT (INPATIENT)
Facility: HOSPITAL | Age: 38
LOS: 11 days | Discharge: NURSING, REHAB OR LONG-TERM CARE | End: 2018-04-10
Attending: HOSPITALIST | Admitting: HOSPITALIST
Payer: MEDICARE

## 2018-03-29 VITALS
HEART RATE: 66 BPM | RESPIRATION RATE: 17 BRPM | SYSTOLIC BLOOD PRESSURE: 181 MMHG | DIASTOLIC BLOOD PRESSURE: 98 MMHG | OXYGEN SATURATION: 99 % | TEMPERATURE: 98 F

## 2018-03-29 DIAGNOSIS — Z98.89 OTHER SPECIFIED POSTPROCEDURAL STATES: Chronic | ICD-10-CM

## 2018-03-29 DIAGNOSIS — M51.26 OTHER INTERVERTEBRAL DISC DISPLACEMENT, LUMBAR REGION: Chronic | ICD-10-CM

## 2018-03-29 DIAGNOSIS — L03.113 CELLULITIS OF RIGHT UPPER LIMB: Chronic | ICD-10-CM

## 2018-03-29 DIAGNOSIS — F05 DELIRIUM DUE TO KNOWN PHYSIOLOGICAL CONDITION: ICD-10-CM

## 2018-03-29 DIAGNOSIS — E78.5 HYPERLIPIDEMIA, UNSPECIFIED: ICD-10-CM

## 2018-03-29 DIAGNOSIS — E11.9 TYPE 2 DIABETES MELLITUS WITHOUT COMPLICATIONS: ICD-10-CM

## 2018-03-29 DIAGNOSIS — Z89.421 ACQUIRED ABSENCE OF OTHER RIGHT TOE(S): Chronic | ICD-10-CM

## 2018-03-29 DIAGNOSIS — Z29.9 ENCOUNTER FOR PROPHYLACTIC MEASURES, UNSPECIFIED: ICD-10-CM

## 2018-03-29 DIAGNOSIS — I10 ESSENTIAL (PRIMARY) HYPERTENSION: ICD-10-CM

## 2018-03-29 DIAGNOSIS — N18.6 END STAGE RENAL DISEASE: ICD-10-CM

## 2018-03-29 DIAGNOSIS — Z89.512 ACQUIRED ABSENCE OF LEFT LEG BELOW KNEE: Chronic | ICD-10-CM

## 2018-03-29 DIAGNOSIS — E87.5 HYPERKALEMIA: ICD-10-CM

## 2018-03-29 DIAGNOSIS — H40.9 UNSPECIFIED GLAUCOMA: Chronic | ICD-10-CM

## 2018-03-29 LAB
ALBUMIN SERPL ELPH-MCNC: 4 G/DL — SIGNIFICANT CHANGE UP (ref 3.3–5)
ALBUMIN SERPL ELPH-MCNC: 4.1 G/DL — SIGNIFICANT CHANGE UP (ref 3.3–5)
ALP SERPL-CCNC: 83 U/L — SIGNIFICANT CHANGE UP (ref 40–120)
ALP SERPL-CCNC: 84 U/L — SIGNIFICANT CHANGE UP (ref 40–120)
ALT FLD-CCNC: 27 U/L — SIGNIFICANT CHANGE UP (ref 4–33)
ALT FLD-CCNC: 33 U/L — SIGNIFICANT CHANGE UP (ref 4–33)
APTT BLD: 38.5 SEC — HIGH (ref 27.5–37.4)
AST SERPL-CCNC: 11 U/L — SIGNIFICANT CHANGE UP (ref 4–32)
AST SERPL-CCNC: 29 U/L — SIGNIFICANT CHANGE UP (ref 4–32)
BASE EXCESS BLDV CALC-SCNC: 1.1 MMOL/L — SIGNIFICANT CHANGE UP
BASE EXCESS BLDV CALC-SCNC: 1.4 MMOL/L — SIGNIFICANT CHANGE UP
BASOPHILS # BLD AUTO: 0.04 K/UL — SIGNIFICANT CHANGE UP (ref 0–0.2)
BASOPHILS NFR BLD AUTO: 0.4 % — SIGNIFICANT CHANGE UP (ref 0–2)
BILIRUB SERPL-MCNC: 0.3 MG/DL — SIGNIFICANT CHANGE UP (ref 0.2–1.2)
BILIRUB SERPL-MCNC: 0.3 MG/DL — SIGNIFICANT CHANGE UP (ref 0.2–1.2)
BLOOD GAS VENOUS - CREATININE: 7.97 MG/DL — HIGH (ref 0.5–1.3)
BLOOD GAS VENOUS - CREATININE: 8.19 MG/DL — HIGH (ref 0.5–1.3)
BUN SERPL-MCNC: 77 MG/DL — HIGH (ref 7–23)
BUN SERPL-MCNC: 79 MG/DL — HIGH (ref 7–23)
BUN SERPL-MCNC: 81 MG/DL — HIGH (ref 7–23)
CALCIUM SERPL-MCNC: 7 MG/DL — LOW (ref 8.4–10.5)
CALCIUM SERPL-MCNC: 7.3 MG/DL — LOW (ref 8.4–10.5)
CALCIUM SERPL-MCNC: 7.4 MG/DL — LOW (ref 8.4–10.5)
CHLORIDE BLDV-SCNC: 92 MMOL/L — LOW (ref 96–108)
CHLORIDE BLDV-SCNC: 95 MMOL/L — LOW (ref 96–108)
CHLORIDE SERPL-SCNC: 86 MMOL/L — LOW (ref 98–107)
CHLORIDE SERPL-SCNC: 86 MMOL/L — LOW (ref 98–107)
CHLORIDE SERPL-SCNC: 88 MMOL/L — LOW (ref 98–107)
CO2 SERPL-SCNC: 22 MMOL/L — SIGNIFICANT CHANGE UP (ref 22–31)
CO2 SERPL-SCNC: 22 MMOL/L — SIGNIFICANT CHANGE UP (ref 22–31)
CO2 SERPL-SCNC: 26 MMOL/L — SIGNIFICANT CHANGE UP (ref 22–31)
CREAT SERPL-MCNC: 7.16 MG/DL — HIGH (ref 0.5–1.3)
CREAT SERPL-MCNC: 7.22 MG/DL — HIGH (ref 0.5–1.3)
CREAT SERPL-MCNC: 7.44 MG/DL — HIGH (ref 0.5–1.3)
EOSINOPHIL # BLD AUTO: 0.13 K/UL — SIGNIFICANT CHANGE UP (ref 0–0.5)
EOSINOPHIL NFR BLD AUTO: 1.3 % — SIGNIFICANT CHANGE UP (ref 0–6)
GAS PNL BLDV: 128 MMOL/L — LOW (ref 136–146)
GAS PNL BLDV: 131 MMOL/L — LOW (ref 136–146)
GLUCOSE BLDV-MCNC: 329 — HIGH (ref 70–99)
GLUCOSE BLDV-MCNC: 392 — HIGH (ref 70–99)
GLUCOSE SERPL-MCNC: 354 MG/DL — HIGH (ref 70–99)
GLUCOSE SERPL-MCNC: 384 MG/DL — HIGH (ref 70–99)
GLUCOSE SERPL-MCNC: 429 MG/DL — HIGH (ref 70–99)
HCO3 BLDV-SCNC: 24 MMOL/L — SIGNIFICANT CHANGE UP (ref 20–27)
HCO3 BLDV-SCNC: 25 MMOL/L — SIGNIFICANT CHANGE UP (ref 20–27)
HCT VFR BLD CALC: 34 % — LOW (ref 34.5–45)
HCT VFR BLDV CALC: 35.4 % — SIGNIFICANT CHANGE UP (ref 34.5–45)
HCT VFR BLDV CALC: 36.7 % — SIGNIFICANT CHANGE UP (ref 34.5–45)
HGB BLD-MCNC: 11.1 G/DL — LOW (ref 11.5–15.5)
HGB BLDV-MCNC: 11.5 G/DL — SIGNIFICANT CHANGE UP (ref 11.5–15.5)
HGB BLDV-MCNC: 11.9 G/DL — SIGNIFICANT CHANGE UP (ref 11.5–15.5)
IMM GRANULOCYTES # BLD AUTO: 0.03 # — SIGNIFICANT CHANGE UP
IMM GRANULOCYTES NFR BLD AUTO: 0.3 % — SIGNIFICANT CHANGE UP (ref 0–1.5)
INR BLD: 1.06 — SIGNIFICANT CHANGE UP (ref 0.88–1.17)
LACTATE BLDV-MCNC: 1.4 MMOL/L — SIGNIFICANT CHANGE UP (ref 0.5–2)
LACTATE BLDV-MCNC: 1.5 MMOL/L — SIGNIFICANT CHANGE UP (ref 0.5–2)
LYMPHOCYTES # BLD AUTO: 1.36 K/UL — SIGNIFICANT CHANGE UP (ref 1–3.3)
LYMPHOCYTES # BLD AUTO: 13.1 % — SIGNIFICANT CHANGE UP (ref 13–44)
MCHC RBC-ENTMCNC: 29.3 PG — SIGNIFICANT CHANGE UP (ref 27–34)
MCHC RBC-ENTMCNC: 32.6 % — SIGNIFICANT CHANGE UP (ref 32–36)
MCV RBC AUTO: 89.7 FL — SIGNIFICANT CHANGE UP (ref 80–100)
MONOCYTES # BLD AUTO: 0.53 K/UL — SIGNIFICANT CHANGE UP (ref 0–0.9)
MONOCYTES NFR BLD AUTO: 5.1 % — SIGNIFICANT CHANGE UP (ref 2–14)
NEUTROPHILS # BLD AUTO: 8.31 K/UL — HIGH (ref 1.8–7.4)
NEUTROPHILS NFR BLD AUTO: 79.8 % — HIGH (ref 43–77)
NRBC # FLD: 0 — SIGNIFICANT CHANGE UP
PCO2 BLDV: 45 MMHG — SIGNIFICANT CHANGE UP (ref 41–51)
PCO2 BLDV: 55 MMHG — HIGH (ref 41–51)
PH BLDV: 7.31 PH — LOW (ref 7.32–7.43)
PH BLDV: 7.37 PH — SIGNIFICANT CHANGE UP (ref 7.32–7.43)
PHOSPHATE SERPL-MCNC: 8.7 MG/DL — HIGH (ref 2.5–4.5)
PLATELET # BLD AUTO: 215 K/UL — SIGNIFICANT CHANGE UP (ref 150–400)
PMV BLD: 10.7 FL — SIGNIFICANT CHANGE UP (ref 7–13)
PO2 BLDV: 45 MMHG — HIGH (ref 35–40)
PO2 BLDV: 73 MMHG — HIGH (ref 35–40)
POTASSIUM BLDV-SCNC: 6 MMOL/L — HIGH (ref 3.4–4.5)
POTASSIUM BLDV-SCNC: 6.3 MMOL/L — CRITICAL HIGH (ref 3.4–4.5)
POTASSIUM SERPL-MCNC: 5.8 MMOL/L — HIGH (ref 3.5–5.3)
POTASSIUM SERPL-MCNC: 6.8 MMOL/L — CRITICAL HIGH (ref 3.5–5.3)
POTASSIUM SERPL-MCNC: SIGNIFICANT CHANGE UP MMOL/L (ref 3.5–5.3)
POTASSIUM SERPL-SCNC: 5.8 MMOL/L — HIGH (ref 3.5–5.3)
POTASSIUM SERPL-SCNC: 6.8 MMOL/L — CRITICAL HIGH (ref 3.5–5.3)
POTASSIUM SERPL-SCNC: SIGNIFICANT CHANGE UP MMOL/L (ref 3.5–5.3)
PROT SERPL-MCNC: 7.6 G/DL — SIGNIFICANT CHANGE UP (ref 6–8.3)
PROT SERPL-MCNC: 8.1 G/DL — SIGNIFICANT CHANGE UP (ref 6–8.3)
PROTHROM AB SERPL-ACNC: 12.2 SEC — SIGNIFICANT CHANGE UP (ref 9.8–13.1)
RBC # BLD: 3.79 M/UL — LOW (ref 3.8–5.2)
RBC # FLD: 13.7 % — SIGNIFICANT CHANGE UP (ref 10.3–14.5)
SAO2 % BLDV: 71.9 % — SIGNIFICANT CHANGE UP (ref 60–85)
SAO2 % BLDV: 93.5 % — HIGH (ref 60–85)
SODIUM SERPL-SCNC: 131 MMOL/L — LOW (ref 135–145)
TROPONIN T SERPL-MCNC: 0.09 NG/ML — HIGH (ref 0–0.06)
WBC # BLD: 10.4 K/UL — SIGNIFICANT CHANGE UP (ref 3.8–10.5)
WBC # FLD AUTO: 10.4 K/UL — SIGNIFICANT CHANGE UP (ref 3.8–10.5)

## 2018-03-29 PROCEDURE — 71045 X-RAY EXAM CHEST 1 VIEW: CPT | Mod: 26

## 2018-03-29 PROCEDURE — 99223 1ST HOSP IP/OBS HIGH 75: CPT

## 2018-03-29 PROCEDURE — 99222 1ST HOSP IP/OBS MODERATE 55: CPT | Mod: GC

## 2018-03-29 RX ORDER — HALOPERIDOL DECANOATE 100 MG/ML
2 INJECTION INTRAMUSCULAR EVERY 6 HOURS
Refills: 0 | Status: DISCONTINUED | OUTPATIENT
Start: 2018-03-29 | End: 2018-04-10

## 2018-03-29 RX ORDER — ONDANSETRON 8 MG/1
4 TABLET, FILM COATED ORAL ONCE
Refills: 0 | Status: COMPLETED | OUTPATIENT
Start: 2018-03-29 | End: 2018-03-29

## 2018-03-29 RX ORDER — INSULIN LISPRO 100/ML
14 VIAL (ML) SUBCUTANEOUS
Refills: 0 | Status: DISCONTINUED | OUTPATIENT
Start: 2018-03-29 | End: 2018-04-01

## 2018-03-29 RX ORDER — SODIUM CHLORIDE 9 MG/ML
500 INJECTION INTRAMUSCULAR; INTRAVENOUS; SUBCUTANEOUS ONCE
Refills: 0 | Status: COMPLETED | OUTPATIENT
Start: 2018-03-29 | End: 2018-03-29

## 2018-03-29 RX ORDER — INSULIN HUMAN 100 [IU]/ML
10 INJECTION, SOLUTION SUBCUTANEOUS ONCE
Refills: 0 | Status: COMPLETED | OUTPATIENT
Start: 2018-03-29 | End: 2018-03-29

## 2018-03-29 RX ORDER — ATORVASTATIN CALCIUM 80 MG/1
20 TABLET, FILM COATED ORAL AT BEDTIME
Refills: 0 | Status: DISCONTINUED | OUTPATIENT
Start: 2018-03-29 | End: 2018-04-10

## 2018-03-29 RX ORDER — GLUCAGON INJECTION, SOLUTION 0.5 MG/.1ML
1 INJECTION, SOLUTION SUBCUTANEOUS ONCE
Refills: 0 | Status: DISCONTINUED | OUTPATIENT
Start: 2018-03-29 | End: 2018-04-10

## 2018-03-29 RX ORDER — OXCARBAZEPINE 300 MG/1
300 TABLET, FILM COATED ORAL
Refills: 0 | Status: DISCONTINUED | OUTPATIENT
Start: 2018-03-29 | End: 2018-04-10

## 2018-03-29 RX ORDER — DEXTROSE 50 % IN WATER 50 %
25 SYRINGE (ML) INTRAVENOUS ONCE
Refills: 0 | Status: DISCONTINUED | OUTPATIENT
Start: 2018-03-29 | End: 2018-04-10

## 2018-03-29 RX ORDER — ALBUTEROL 90 UG/1
2.5 AEROSOL, METERED ORAL ONCE
Refills: 0 | Status: COMPLETED | OUTPATIENT
Start: 2018-03-29 | End: 2018-03-29

## 2018-03-29 RX ORDER — METOPROLOL TARTRATE 50 MG
100 TABLET ORAL
Refills: 0 | Status: DISCONTINUED | OUTPATIENT
Start: 2018-03-29 | End: 2018-04-10

## 2018-03-29 RX ORDER — MORPHINE SULFATE 50 MG/1
4 CAPSULE, EXTENDED RELEASE ORAL ONCE
Refills: 0 | Status: DISCONTINUED | OUTPATIENT
Start: 2018-03-29 | End: 2018-03-29

## 2018-03-29 RX ORDER — DEXTROSE 50 % IN WATER 50 %
1 SYRINGE (ML) INTRAVENOUS ONCE
Refills: 0 | Status: DISCONTINUED | OUTPATIENT
Start: 2018-03-29 | End: 2018-04-10

## 2018-03-29 RX ORDER — ZOLPIDEM TARTRATE 10 MG/1
5 TABLET ORAL AT BEDTIME
Refills: 0 | Status: DISCONTINUED | OUTPATIENT
Start: 2018-03-29 | End: 2018-03-29

## 2018-03-29 RX ORDER — INSULIN GLARGINE 100 [IU]/ML
14 INJECTION, SOLUTION SUBCUTANEOUS AT BEDTIME
Refills: 0 | Status: DISCONTINUED | OUTPATIENT
Start: 2018-03-29 | End: 2018-04-01

## 2018-03-29 RX ORDER — HALOPERIDOL DECANOATE 100 MG/ML
1 INJECTION INTRAMUSCULAR AT BEDTIME
Refills: 0 | Status: DISCONTINUED | OUTPATIENT
Start: 2018-03-29 | End: 2018-04-05

## 2018-03-29 RX ORDER — DEXTROSE 50 % IN WATER 50 %
50 SYRINGE (ML) INTRAVENOUS ONCE
Refills: 0 | Status: COMPLETED | OUTPATIENT
Start: 2018-03-29 | End: 2018-03-29

## 2018-03-29 RX ORDER — INSULIN LISPRO 100/ML
VIAL (ML) SUBCUTANEOUS AT BEDTIME
Refills: 0 | Status: DISCONTINUED | OUTPATIENT
Start: 2018-03-29 | End: 2018-04-10

## 2018-03-29 RX ORDER — DEXTROSE 50 % IN WATER 50 %
12.5 SYRINGE (ML) INTRAVENOUS ONCE
Refills: 0 | Status: DISCONTINUED | OUTPATIENT
Start: 2018-03-29 | End: 2018-04-10

## 2018-03-29 RX ORDER — INSULIN LISPRO 100/ML
VIAL (ML) SUBCUTANEOUS
Refills: 0 | Status: DISCONTINUED | OUTPATIENT
Start: 2018-03-29 | End: 2018-04-10

## 2018-03-29 RX ORDER — ACETAMINOPHEN 500 MG
650 TABLET ORAL EVERY 6 HOURS
Refills: 0 | Status: DISCONTINUED | OUTPATIENT
Start: 2018-03-29 | End: 2018-04-10

## 2018-03-29 RX ORDER — CALCIUM ACETATE 667 MG
2001 TABLET ORAL
Refills: 0 | Status: DISCONTINUED | OUTPATIENT
Start: 2018-03-29 | End: 2018-04-10

## 2018-03-29 RX ORDER — AMLODIPINE BESYLATE 2.5 MG/1
10 TABLET ORAL DAILY
Refills: 0 | Status: DISCONTINUED | OUTPATIENT
Start: 2018-03-29 | End: 2018-03-30

## 2018-03-29 RX ORDER — SODIUM CHLORIDE 9 MG/ML
1000 INJECTION, SOLUTION INTRAVENOUS
Refills: 0 | Status: DISCONTINUED | OUTPATIENT
Start: 2018-03-29 | End: 2018-04-10

## 2018-03-29 RX ORDER — HEPARIN SODIUM 5000 [USP'U]/ML
5000 INJECTION INTRAVENOUS; SUBCUTANEOUS EVERY 8 HOURS
Refills: 0 | Status: DISCONTINUED | OUTPATIENT
Start: 2018-03-29 | End: 2018-04-03

## 2018-03-29 RX ADMIN — MORPHINE SULFATE 4 MILLIGRAM(S): 50 CAPSULE, EXTENDED RELEASE ORAL at 08:11

## 2018-03-29 RX ADMIN — HALOPERIDOL DECANOATE 1 MILLIGRAM(S): 100 INJECTION INTRAMUSCULAR at 21:07

## 2018-03-29 RX ADMIN — Medication 50 MILLILITER(S): at 09:01

## 2018-03-29 RX ADMIN — MORPHINE SULFATE 4 MILLIGRAM(S): 50 CAPSULE, EXTENDED RELEASE ORAL at 08:30

## 2018-03-29 RX ADMIN — SODIUM CHLORIDE 500 MILLILITER(S): 9 INJECTION INTRAMUSCULAR; INTRAVENOUS; SUBCUTANEOUS at 06:30

## 2018-03-29 RX ADMIN — ALBUTEROL 2.5 MILLIGRAM(S): 90 AEROSOL, METERED ORAL at 09:01

## 2018-03-29 RX ADMIN — INSULIN HUMAN 10 UNIT(S): 100 INJECTION, SOLUTION SUBCUTANEOUS at 09:01

## 2018-03-29 RX ADMIN — INSULIN GLARGINE 14 UNIT(S): 100 INJECTION, SOLUTION SUBCUTANEOUS at 21:08

## 2018-03-29 RX ADMIN — Medication 14 UNIT(S): at 18:07

## 2018-03-29 RX ADMIN — Medication 100 MILLIGRAM(S): at 18:07

## 2018-03-29 RX ADMIN — ATORVASTATIN CALCIUM 20 MILLIGRAM(S): 80 TABLET, FILM COATED ORAL at 21:07

## 2018-03-29 RX ADMIN — ONDANSETRON 4 MILLIGRAM(S): 8 TABLET, FILM COATED ORAL at 06:30

## 2018-03-29 RX ADMIN — Medication 4: at 12:34

## 2018-03-29 RX ADMIN — HEPARIN SODIUM 5000 UNIT(S): 5000 INJECTION INTRAVENOUS; SUBCUTANEOUS at 21:09

## 2018-03-29 RX ADMIN — Medication 650 MILLIGRAM(S): at 19:47

## 2018-03-29 RX ADMIN — ONDANSETRON 4 MILLIGRAM(S): 8 TABLET, FILM COATED ORAL at 08:11

## 2018-03-29 RX ADMIN — Medication 2001 MILLIGRAM(S): at 18:07

## 2018-03-29 RX ADMIN — OXCARBAZEPINE 300 MILLIGRAM(S): 300 TABLET, FILM COATED ORAL at 18:07

## 2018-03-29 RX ADMIN — Medication 650 MILLIGRAM(S): at 20:30

## 2018-03-29 NOTE — CONSULT NOTE ADULT - PROBLEM SELECTOR RECOMMENDATION 2
Pt presenting with hyperkalemia in the setting of ESRD and missed HD. Recommend medical management until HD can be arranged today. Monitor serum potassium Pt. with severe hyperkalemia in setting of ESRD and noncompliance to HD and low potassium diet. Low potassium diet advised. Monitor serum potassium

## 2018-03-29 NOTE — CONSULT NOTE ADULT - SUBJECTIVE AND OBJECTIVE BOX
[facility] DIVISION OF KIDNEY DISEASES AND HYPERTENSION -- INITIAL CONSULT NOTE  --------------------------------------------------------------------------------    HPI: 37-year-old female with left BKA, ESRD on HD TTS, DM, HTN admitted with missed HD and acute SOB found to have hyperkalemia of 6.3. Pt with recent admission  (3/26-3/27) for urgent HD in the setting of hyperkalemia of 7.2 with EKG changes admitted to MICU. Pt missed her HD this AM because her transportation never arrived for . Pt states she has been on HD for two years and her last outpatient dialysis was on Tuesday prior to her discharge. Pt.'s outpatient nephrologist is Dr. Goldberg in [place]. Pt. states she is lives at home with her daughter and her daughter is currently admitted to [facility]. Pt. denies any recent illness and admits to poor compliance with renal diet. Pt seen and examined in ER. Patient admits to significant SOB. Denies CP or LE edema.     PAST HISTORY  --------------------------------------------------------------------------------  PAST MEDICAL & SURGICAL HISTORY:  CKD (chronic kidney disease): creat ~2 as per pt  Diabetes  Back pain  Abscess  Anemia  Hypertension  Glaucoma  Abscess of arm, right: and abscess of mons pubis, s/p I and D  History of   Glaucoma  Herniated lumbar intervertebral disc    FAMILY HISTORY:  Family history of renal failure (Sibling)  Family history of diabetes mellitus (DM) (Sibling)    PAST SOCIAL HISTORY:    ALLERGIES & MEDICATIONS  --------------------------------------------------------------------------------  Allergies    No Known Allergies    Intolerances      Standing Inpatient Medications    PRN Inpatient Medications      REVIEW OF SYSTEMS  --------------------------------------------------------------------------------  Gen: No weakness  Skin: No rashes  Head/Eyes/Ears/Mouth: No headache  Respiratory: + dyspnea  CV: No chest pain  GI: No abdominal pain, diarrhea  MSK: No edema  Neuro: No dizziness/lightheadedness  Heme: No easy bruising or bleeding    All other systems were reviewed and are negative, except as noted.    VITALS/PHYSICAL EXAM  --------------------------------------------------------------------------------  T(C): 36.5 (18 @ 09:32), Max: 36.7 (18 @ 06:33)  HR: 66 (18 @ 09:01) (66 - 68)  BP: 115/62 (18 @ 09:01) (115/62 - 181/98)  RR: 15 (18 @ 09:01) (15 - 17)  SpO2: 100% (18 @ 09:01) (99% - 100%)  Wt(kg): --    Physical Exam:  	Gen: Mild respiratory distress    	HEENT: supple neck   	Pulm: CTA B/L  	CV: RRR, S1S2  	Abd: +BS, soft, nontender  	UE: Warm, no asterixis  	LE: Warm, no edema  	Skin: Warm, without rashes  	Vascular access: + LUE AVF + thrill + bruit, skin intact     LABS/STUDIES  --------------------------------------------------------------------------------              11.1   10.40 >-----------<  215      [18 06:25]              34.0     131  |  88  |  81  ----------------------------<  384      [18 07:30]  6.8   |  22  |  7.22        Ca     7.4     [18 07:30]    TPro  8.1  /  Alb  4.0  /  TBili  0.3  /  DBili  x   /  AST  29  /  ALT  33  /  AlkPhos  84  [18 06:25]    PT/INR: PT 12.2 , INR 1.06       [18 06:25]  PTT: 38.5       [18 06:25]    Troponin 0.09      [18 06:25]    Creatinine Trend:  SCr 7.22 [ 07:30]  SCr 7.16 [ 06:25]  SCr 5.85 [ 04:30]  SCr 5.37 [ 22:30]  SCr 9.66 [ 11:18]    Urinalysis - [06-18-15 @ 21:42]      Color Yellow / Appearance Clear / SG 1.014 / pH 7.0      Gluc 250 / Ketone Negative  / Bili Negative / Urobili Normal       Blood Small / Protein 300 / Leuk Est Negative / Nitrite Negative      RBC  / WBC  / Hyaline  / Gran  / Sq Epi  / Non Sq Epi  / Bacteria       HbA1c 7.7      [18 @ 09:20]    HBsAb 31.9      [18 @ 16:50]  HBsAg NEGATIVE      [18 @ 16:50]  HBcAb Reactive      [18 @ 16:50]  HCV 0.20, Nonreactive Hepatitis C AB  S/CO Ratio                        Interpretation  < 1.0                                     Non-Reactive  1.0 - 4.9                           Weakly-Reactive  > 5.0                                 Reactive  Non-Reactive: Aperson with a non-reactive HCV antibody  result is considered uninfected.  No further action is  needed unless recent infection is suspected.  In these  cases, consider repeat testing later to detect  seroconversion..  Weakly-Reactive: HCV antibody test is abnormal, HCV RNA  Qualitative test will follow.  Reactive: HCV antibody test is abnormal, HCV RNA  Qualitative test will follow.  Note: HCV antibody testing is performed on the [employer]   system.      [18 @ 16:50]    C3 Complement 138.0      [03-03-15 @ 04:12]  C4 Complement 36.2      [03-03-15 @ 04:12] [facility] DIVISION OF KIDNEY DISEASES AND HYPERTENSION -- INITIAL CONSULT NOTE  --------------------------------------------------------------------------------  HPI: 37-year-old female with ESRD on HD TIW (TTS), DM, HTN, and left BKA admitted for acute SOB and severe hyperkalemia. Pt. with recent hospital stay at [facility] (3/26-3/27) during which she received urgent HD in the MICU for severe hyperkalemia. Pt. missed her HD session this AM secondary to transportation issues. Pt. has been on HD for two years and her last outpatient dialysis was on Tuesday 3/27 at [facility] prior to her discharge. Pt.'s outpatient nephrologist is Dr. Goldberg in [place]. Pt. states she is lives at home with her daughter and her daughter is currently admitted to [facility]. Patient currently admits to significant SOB. Denies CP or LE edema.     PAST HISTORY  --------------------------------------------------------------------------------  PAST MEDICAL & SURGICAL HISTORY:  CKD (chronic kidney disease): creat ~2 as per pt  Diabetes  Back pain  Abscess  Anemia  Hypertension  Glaucoma  Abscess of arm, right: and abscess of mons pubis, s/p I and D  History of   Glaucoma  Herniated lumbar intervertebral disc    FAMILY HISTORY:  Family history of renal failure (Sibling)  Family history of diabetes mellitus (DM) (Sibling)    PAST SOCIAL HISTORY:    ALLERGIES & MEDICATIONS  --------------------------------------------------------------------------------  Allergies    No Known Allergies    Intolerances    Standing Inpatient Medications    PRN Inpatient Medications    REVIEW OF SYSTEMS  --------------------------------------------------------------------------------  Gen: No weakness  Head/Eyes/Ears/Mouth: No headache  Respiratory: +dyspnea  CV: No chest pain  GI: No abdominal pain, diarrhea  MSK: No edema  Neuro: No dizziness    All other systems were reviewed and are negative, except as noted.    VITALS/PHYSICAL EXAM  --------------------------------------------------------------------------------  T(C): 36.5 (18 @ 09:32), Max: 36.7 (18 @ 06:33)  HR: 66 (18 @ 09:01) (66 - 68)  BP: 115/62 (18 @ 09:01) (115/62 - 181/98)  RR: 15 (18 @ 09:01) (15 - 17)  SpO2: 100% (18 @ 09:01) (99% - 100%)  Wt(kg): --    Physical Exam:  	Gen: Mild respiratory distress    	HEENT: supple neck   	Pulm: fair air entry B/L  	CV: S1S2+  	Abd: +BS, soft, nontender  	UE: Warm, no asterixis  	LE: + Left BKA,   	Skin: Warm  	Vascular access: + LUE AVF + thrill + bruit, skin intact     LABS/STUDIES  --------------------------------------------------------------------------------              11.1   10.40 >-----------<  215      [18 @ 06:25]              34.0     131  |  88  |  81  ----------------------------<  384      [18 @ 07:30]  6.8   |  22  |  7.22        Ca     7.4     [18 07:30]    TPro  8.1  /  Alb  4.0  /  TBili  0.3  /  DBili  x   /  AST  29  /  ALT  33  /  AlkPhos  84  [18 06:25]    Troponin 0.09      [18 06:25]    Creatinine Trend:  SCr 7.22 [:30]  SCr 7.16 [:25]  SCr 5.85 [ 04:30]  SCr 5.37 [ 22:30]  SCr 9.66 [ 11:18]  HBsAb 31.9      [18 @ 16:50]  HBsAg NEGATIVE      [18 16:50]  HBcAb Reactive      [18 16:50]  HCV 0.20, Nonreactive Hepatitis C AB  S/CO Ratio                        Interpretation  < 1.0                                     Non-Reactive  1.0 - 4.9                           Weakly-Reactive  > 5.0                                 Reactive  Non-Reactive: Aperson with a non-reactive HCV antibody  result is considered uninfected.  No further action is  needed unless recent infection is suspected.  In these  cases, consider repeat testing later to detect  seroconversion..  Weakly-Reactive: HCV antibody test is abnormal, HCV RNA  Qualitative test will follow.  Reactive: HCV antibody test is abnormal, HCV RNA  Qualitative test will follow.  Note: HCV antibody testing is performed on the [employer] system.      [18 @ 16:50]

## 2018-03-29 NOTE — ED PROVIDER NOTE - MEDICAL DECISION MAKING DETAILS
36yo F pmh left BKA, ESRD on HD T/Th/Sat, DM, HTN, p/w diaphoresis and vomiting. Pt has a track record of missing HD sessions and hyperkalemia due to medical noncompliance. She has reportedly missed several HD sessions recently. She appears to have some degree of AMS but unclear of baseline. Pt does not appear volume overloaded; minimal pitting edema present, no rales on lung exam, mucous membranes dry. EKG with borderline peaked Ts. Will obtain labs, cxr, admin zofran and minimal fluids, plan to admit -Bogdan

## 2018-03-29 NOTE — ED PROVIDER NOTE - ATTENDING CONTRIBUTION TO CARE
38 yo F pmhx L BKA, ESRD on HD (T/Th/Sat), DM, HTN, p/w diaphoresis, vomiting, AMS. A rapid response was called on this pt as she was in the PICU with her son. Pt has a track record of missing HD sessions and hyperkalemia due to medical noncompliance but the last session was two days ago during an admission.   Likely hyperkalemia: labs (including VBG), chest xray, ecg, will treat for hyperK and admit to tele for further monitoring as well as renal consult.   I performed a history and physical exam of the patient and discussed their management with the resident. I reviewed the resident's note and agree with the documented findings and plan of care. My medical decision making and observations are found above.

## 2018-03-29 NOTE — BEHAVIORAL HEALTH ASSESSMENT NOTE - HPI (INCLUDE ILLNESS QUALITY, SEVERITY, DURATION, TIMING, CONTEXT, MODIFYING FACTORS, ASSOCIATED SIGNS AND SYMPTOMS)
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  Pt was at [facility] with her daughter, when a rapid response was called for her.  Found to have elevated K, peaked T's. Admitted to telemetry for monitoring and HD.  Note, pt had a similar episode of acute AMS with rapid response called for her on 3/26.  Pt received urgent HD and was discharged.  Psych consulted for recurrent episodes of AMS.      Seen and examined at bedside in dialysis (completed).  Pt is intermittently climbing out of bed, but redirectable.  She is observed talking to herself as if someone was there, appears preoccupied.  She also has frequent twitching motions.  She is lethargic, arousable to voice.  She has significant delayed response.  Pt was asked why she did not go to dialysis.  Minutes later, pt spontaneously states "the transportation didn't come".  Pt states she is at "[facility]", date is "March 29, 2018" (after long delay).  She initially states she came for stomach pain.  Later states she was taken here by the nurse because of confusion.  States she was at ICU with her daughter who had heart surgery yesterday.  Pt states she has missed 2 dialysis sessions because transportation didn't come.  Pt reports "ok" mood.  Reports feeling "a little bit" depressed at times.  States this lasts one day.  Pt denies SI/HI.  Denies AH/VH or psychotic symptoms.  Pt denies past psychiatric history, treatment, hospitalization, suicide attempt, or illicit substance use.    Spoke with pt's sister.  She states pt is normally cognitively intact.  States pt was sleepy and answering slowly on 3/26 and she was much better after dialysis.  States pt was ok yesterday.  Upon arrival to hospital, she was told that pt was again sent to ER for confusion.  She is unclear what medications pt takes and how often.     Ref# [id number].  Note, pt was normally prescribed Lyrica 100mg 30-day supply, #60.  On 3/23, it was increased to #120 (same 30-day supply).  This is a significant increase especially since pt has low CrCl.  Unable to clarify how pt is taking per medication given current mental status.

## 2018-03-29 NOTE — BEHAVIORAL HEALTH ASSESSMENT NOTE - SUMMARY
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  Pt was at [facility] with her daughter, when a rapid response was called for her.  Found to have elevated K, peaked T's. Admitted to telemetry for monitoring and HD.  Note, pt had a similar episode of acute AMS with rapid response called for her on 3/26.  Pt received urgent HD and was discharged.  Psych consulted for recurrent episodes of AMS.      On exam, pt lethargic, oriented x3.  She presents with waxing and waning confusion c/w multifactorial delirium.  Recommend Haldol 1mg qHS for delirium.  Also recommend holding Lyrica.  PRNs as below.  Monitor EKG for QTC.  Will follow. 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  Pt was at [facility] with her daughter, when a rapid response was called for her.  Found to have elevated K, peaked T's. Admitted to telemetry for monitoring and HD.  Note, pt had a similar episode of acute AMS with rapid response called for her on 3/26.  Pt received urgent HD and was discharged.  Psych consulted for recurrent episodes of AMS.      On exam, pt lethargic, oriented x3.  She presents with waxing and waning confusion c/w multifactorial delirium.  Recommend Haldol 1mg qHS for delirium.  Also recommend holding Lyrica.  PRNs as below.  Monitor EKG for QTC.  F/U utox, B12, folate, TSH, RPR.  Will follow. Pt is a 38yo female, single, caregiver to 11yo daughter (currently hospitalized at [facility] s/ heart surgery), unemployed, domiciled with daughter, with no significant past psychiatric history, no past psychiatric hospitalizations, no past suicide attempts, no substance use/abuse, PMH HTN, DM, left BKA, ESRD (on hemodialysis T/Th/S), p/w diaphoresis and vomiting this morning.  Pt was at [facility] with her daughter, when a rapid response was called for her.  Found to have elevated K, peaked T's. Admitted to telemetry for monitoring and HD.  Note, pt had a similar episode of acute AMS with rapid response called for her on 3/26.  Pt received urgent HD and was discharged.  Psych consulted for recurrent episodes of AMS.      On exam, pt lethargic, oriented x3.  She presents with waxing and waning confusion.  Pt with numerous factors that could contribute to delirium.  Recommend Haldol 1mg qHS for delirium.  Also recommend holding Lyrica.  PRNs as below.  Monitor EKG for QTC.  F/U utox, B12, folate, TSH, RPR.  Will follow. 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  Pt was at [facility] with her daughter, when a rapid response was called for her.  Found to have elevated K, peaked T's. Admitted to telemetry for monitoring and HD.  Note, pt had a similar episode of acute AMS with rapid response called for her on 3/26.  Pt received urgent HD and was discharged.  Psych consulted for recurrent episodes of AMS.      On exam, pt lethargic, oriented x3.  She presents with waxing and waning confusion c/w delirium.  Pt with numerous factors that could contribute to mental status change.  Recommend Haldol 1mg qHS for delirium.  Also recommend holding Lyrica.  PRNs as below.  Monitor EKG for QTC.  F/U utox, B12, folate, TSH, RPR.  Will follow.

## 2018-03-29 NOTE — BEHAVIORAL HEALTH ASSESSMENT NOTE - NSBHCHARTREVIEWLAB_PSY_A_CORE FT
11.1   10.40 )-----------( 215      ( 29 Mar 2018 06:25 )             34.0     03-29    131<L>  |  86<L>  |  79<H>  ----------------------------<  429<H>  5.8<H>   |  26  |  7.44<H>    Ca    7.0<L>      29 Mar 2018 09:37  Phos  8.7     03-29    TPro  7.6  /  Alb  4.1  /  TBili  0.3  /  DBili  x   /  AST  11  /  ALT  27  /  AlkPhos  83  03-29

## 2018-03-29 NOTE — H&P ADULT - NEGATIVE ENMT SYMPTOMS
no nasal obstruction/no throat pain no sinus symptoms/no nasal congestion/no nasal discharge/no nasal obstruction/no throat pain

## 2018-03-29 NOTE — ED ADULT NURSE NOTE - NS ED NURSE REPORT GIVEN TO FT
Neel Dialysis RN at 1100am(pt to go to dialysis first), report given also to Cindy on 7N at 1122am for floor bed assignment

## 2018-03-29 NOTE — ED PROVIDER NOTE - OBJECTIVE STATEMENT
38yo F pmh left BKA, ESRD on HD T/Th/Sat, DM, HTN, p/w diaphoresis and vomiting. Pt has a track record of missing HD sessions and hyperkalemia due to medical noncompliance. She has reportedly missed several HD sessions recently, as she has been at [facility] due to the fact that her son recently had heart surgery there. A rapid response was called this AM when the patient became very  diaphoretic and vomited while at [facility].    O/P nephrologist Dr Garcia

## 2018-03-29 NOTE — BEHAVIORAL HEALTH ASSESSMENT NOTE - NSBHCHARTREVIEWIMAGING_PSY_A_CORE FT
< from: CT Head No Cont (03.26.18 @ 14:22) >    IMPRESSION:  No acute intracranial hemorrhage, mass or hydrocephalus. No interval   change.    < end of copied text >

## 2018-03-29 NOTE — BEHAVIORAL HEALTH ASSESSMENT NOTE - CASE SUMMARY
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  Pt was at [facility] with her daughter, when a rapid response was called for her.  Found to have elevated K, peaked T's. Admitted to telemetry for monitoring and HD.  Note, pt had a similar episode of acute AMS with rapid response called for her on 3/26.  Pt received urgent HD and was discharged.  Psych consulted for recurrent episodes of AMS.   Pt agrees that her higher level of Lyrica could have caused her to become confused.  She is distressed that she is unable to find her wheel chair and says she really wants to visit her daughter who is in the pediatric ICU.  Pt may be escorted to see her daughter in a wheel chair if this is cleared by her medical and nursing staff.

## 2018-03-29 NOTE — H&P ADULT - HISTORY OF PRESENT ILLNESS
Pt is a 38 Yo F with a pMHx of HTN, DM, left BKA, ESRD (on hemodialysis T/Th/S) p/w diaphoresis and vomiting this morning.  Pt was at [facility] with her son when a rapid response was called. Pt is a poor historian and does not remember why she is in the ED. Of note, pt is non-compliant and has a history of hyperkalemia and missed dialysis sessions, however, pt denies missing dialysis. Pt admits to fatigue, nausea, and abdominal pain. Pt denies CP, SOB, diarrhea, constipation. 36 y/o Female, with a PmHx of HTN, DM, left BKA, ESRD (on hemodialysis T/Th/S), p/w diaphoresis and vomiting this morning.  Pt was at [facility] with her son (who is getting heart surgery), when a rapid response was called for her. Pt is a poor historian and does not remember why she is in the ED and was difficult to get information from her. Of note, pt is non-compliant and has a history of hyperkalemia and missed dialysis sessions, however, pt denies missing dialysis. Pt admits to fatigue, nausea, and abdominal pain. Pt denies CP, SOB, diarrhea, constipation. In the ED, she was found to be hyperkalemic with a k of 6.8. She was admitted to telemetry for HD.

## 2018-03-29 NOTE — H&P ADULT - NSHPPHYSICALEXAM_GEN_ALL_CORE
Vital Signs Last 24 Hrs  T(C): 36.4 (29 Mar 2018 11:02), Max: 36.7 (29 Mar 2018 06:33)  T(F): 97.5 (29 Mar 2018 11:02), Max: 98 (29 Mar 2018 06:33)  HR: 65 (29 Mar 2018 11:02) (65 - 68)  BP: 164/75 (29 Mar 2018 11:02) (115/62 - 181/98)  BP(mean): --  RR: 16 (29 Mar 2018 11:02) (15 - 17)  SpO2: 100% (29 Mar 2018 11:02) (99% - 100%)    EKG: NSR @ 67, 1st deg avb, peaked t v4-6

## 2018-03-29 NOTE — CONSULT NOTE ADULT - PROBLEM SELECTOR RECOMMENDATION 9
ESRD on HD. Pt missed her maintenance HD today and now presents with acute SOB and hyperkalemia. Plan for urgent HD with UF bedside once a telemetry bed can be arranged. Pt. with ESRD on HD TIW (TTS). Pt. missed her maintenance HD today and now presents with acute SOB, fluid overload, HTN and hyperkalemia. Plan for urgent HD today. Monitor BP and labs

## 2018-03-29 NOTE — BEHAVIORAL HEALTH ASSESSMENT NOTE - NSBHCONSULTMEDAGITATION_PSY_A_CORE FT
Haldol 2mg PO/IV/IM q6h PRN agitation.  Hold for QTC > 500. Haldol 0.5mg PO/IV/IM q6h PRN delirium/ agitation.  Hold for QTC > 500.

## 2018-03-29 NOTE — ED PROVIDER NOTE - PROGRESS NOTE DETAILS
Spoke to patient's outpatient nephrologist Dr. Garcia regarding patient's clinical status, he was unable to confirm patient's last dialysis session and does not practice at [facility], he is aware patient will be admitted.  Jayden Garcia M.D. Resident

## 2018-03-29 NOTE — H&P ADULT - PSH
Abscess of arm, right  and abscess of mons pubis, s/p I and D  Glaucoma    Herniated lumbar intervertebral disc    History of below knee amputation, left    History of     Toe amputation status, right  right 1st digit amputated

## 2018-03-29 NOTE — H&P ADULT - ASSESSMENT
Pt is a 38 Yo F with a pMHx of HTN, DM, left BKA, ESRD (on hemodialysis T/Th/S) p/w diaphoresis and vomiting this morning found to have a potassium 6.8, EKG with peaked T's V4-V6. Admitted to telemetry for monitoring. 38 y/o Female, with a PmHx of HTN, DM, left BKA, ESRD (on hemodialysis T/Th/S), p/w diaphoresis and vomiting this morning found to have a potassium 6.8, EKG with peaked T's V4-V6. Admitted to telemetry for monitoring and HD.

## 2018-03-29 NOTE — BEHAVIORAL HEALTH ASSESSMENT NOTE - NSBHCONSULTOBSREASON_PSY_A_CORE FT
Defer to primary team.  Pt may benefit from enhanced supervision given confusion and impulsivity (climbing out of bed).

## 2018-03-29 NOTE — BEHAVIORAL HEALTH ASSESSMENT NOTE - RISK ASSESSMENT
Risk factors include single, child who recently had surgery, acute and chronic medical issues.  Protective factors include stable domicile, social support, no past psych hosp or SA, no substance use.  Pt does not present an acute risk of harm to self or others.  Pt does not require inpatient psychiatric hospitalization. Risk factors include single parent, child who recently had surgery, acute and chronic medical issues.  Protective factors include stable domicile, social support, no past psych hosp or SA, no substance use.  Pt does not present an acute risk of harm to self or others.  Pt does not require inpatient psychiatric hospitalization.

## 2018-03-29 NOTE — BEHAVIORAL HEALTH ASSESSMENT NOTE - NSBHCHARTREVIEWVS_PSY_A_CORE FT
Vital Signs Last 24 Hrs  T(C): 36.5 (29 Mar 2018 15:41), Max: 36.8 (29 Mar 2018 11:45)  T(F): 97.7 (29 Mar 2018 15:41), Max: 98.2 (29 Mar 2018 11:45)  HR: 68 (29 Mar 2018 15:41) (65 - 87)  BP: 161/93 (29 Mar 2018 15:41) (115/62 - 181/98)  BP(mean): --  RR: 16 (29 Mar 2018 15:41) (15 - 17)  SpO2: 100% (29 Mar 2018 15:41) (99% - 100%)

## 2018-03-29 NOTE — H&P ADULT - PMH
Anemia    Back pain    Diabetes    ESRD (end stage renal disease) on dialysis  T, Th, Sat  Glaucoma    Hyperlipidemia    Hypertension

## 2018-03-29 NOTE — H&P ADULT - NSHPSOCIALHISTORY_GEN_ALL_CORE
Pt is single and lives at home with her son. Pt is currently unemployed. Pt admits to smoking cigarettes occasionally. Denies ETOH use.

## 2018-03-29 NOTE — ED ADULT TRIAGE NOTE - CHIEF COMPLAINT QUOTE
Pt comes in as a rapid response pt, that became diaphoretic and vomiting in PICU. Left FA AV fistula, dialysis days and last dialysis unknown and left BKA.

## 2018-03-29 NOTE — H&P ADULT - ATTENDING COMMENTS
36 y/o Female, with a PmHx of HTN, DM, left BKA, ESRD (on hemodialysis T/Th/S), who was discharged 2 days ago after having RRT called in [facility] while visiting daughter found to be lethargic and missed HD, symptoms resolved s/p HD, at the time thought to have been 2/2 opioid use now again sent from [facility]'s s/p RRT for lethargy and vomiting found to have a potassium 6.8 sent for urgent HD. Pt lethargic on exam, arousable aox2-3 (knows the year but cannot stay awake long enough to complete full date), otherwise denies all complaints, recalls she was visiting her daughter but could not explain why she was sent to the hospital. Exam benign, no e/o overload, VS wnl, afeb, not elevated wbc, hgb stable, BMP with e/o having missed HD, VBG without sig acid/base abnormality or lactate. Presentation c/f opioid abuse given lack of e/o for organic cause and repeated admissions with similar clinical presentations.  - given pt's repeated episodes of lethargy which previously resolved without intervention, thought to be 2/2 opioid use, c/f pt having abused opioids since discharge  - pt would not respond to whether she makes urine, if so, obtain utox, however may be confounded by ongoing dialysis  - d/c all sedating medications, oxy and ambien  - c/w remainder of home meds  - closely monitor VS q4h, monitor on tele  - psych c/s  - continue to attempt to obtain collateral info 36 y/o Female, with a PmHx of HTN, DM, left BKA, ESRD (on hemodialysis T/Th/S), who was discharged 2 days ago after having RRT called in [facility] while visiting daughter found to be lethargic and missed HD, symptoms resolved s/p HD, at the time thought to have been 2/2 opioid use now again sent from [facility]'s s/p RRT for lethargy and vomiting found to have a potassium 6.8 sent for urgent HD. Pt lethargic on exam, arousable aox2-3 (knows the year but cannot stay awake long enough to complete full date), otherwise denies all complaints, recalls she was visiting her daughter but could not explain why she was sent to the hospital. Exam benign, no e/o overload, VS wnl, afeb, not elevated wbc, hgb stable, BMP with e/o having missed HD, VBG without sig acid/base abnormality or lactate. Presentation c/f opioid abuse given lack of e/o for organic cause and repeated admissions with similar clinical presentations.  - given pt's repeated episodes of lethargy which previously resolved without intervention, thought to be 2/2 opioid use, c/f pt having abused opioids since discharge  - pt would not respond to whether she makes urine, if so, obtain utox, however may be confounded by ongoing dialysis  - d/c all sedating medications, oxy and ambien  - given AMS, obtain NCHCT  - c/w remainder of home meds  - closely monitor VS q4h, monitor on tele  - psych c/s  - continue to attempt to obtain collateral info 36 y/o Female, with a PmHx of HTN, DM, left BKA, ESRD (on hemodialysis T/Th/S), who was discharged 2 days ago after having RRT called in [facility] while visiting daughter found to be lethargic and missed HD, symptoms resolved s/p HD, at the time thought to have been 2/2 opioid use now again sent from [facility]'s s/p RRT for lethargy and vomiting found to have a potassium 6.8 sent for urgent HD. Pt lethargic on exam, arousable aox2-3 (knows the year but cannot stay awake long enough to complete full date), otherwise denies all complaints, recalls she was visiting her daughter but could not explain why she was sent to the hospital. Exam benign, no e/o overload, VS wnl, afeb, not elevated wbc, hgb stable, BMP with e/o having missed HD, VBG without sig acid/base abnormality or lactate. Presentation c/f uremic encephalopathy vs medication side effects vs opioid abuse given lack of e/o for organic cause and repeated admissions with similar clinical presentations.  - pt would not respond to whether she makes urine, if so, obtain utox, however may be confounded by dialysis  - d/c all sedating medications, oxy and ambien  - undergoing HD, fu renal recs  - given AMS, obtain NCHCT  - c/w remainder of home meds  - closely monitor VS q4h, monitor on tele  - psych c/s  - continue to attempt to obtain collateral info

## 2018-03-29 NOTE — CONSULT NOTE ADULT - ASSESSMENT
37-year-old female with left BKA, ESRD on HD TTS, DM, HTN admitted with missed HD and acute SOB found to have hyperkalemia of 6.3. Pt. with ESRD on HD TIW admitted with acute SOB, fluid overload, HTN and severe hyperkalemia.

## 2018-03-29 NOTE — ED ADULT NURSE NOTE - OBJECTIVE STATEMENT
Pt [place] into [place] C [place] [facility] [place] and suddenly felt nauseous, started vomiting and became diaphoretic. Pt A&Ox3, appears weak and tired, moaning on assessment. Pt denies CP, SOB, diarrhea, fevers, chills. MD evaluated, VS as noted. 22 G IV placed to R wrist, labs sent, medicated as ordered. Pt has PMH ESRD on dialysis; normal dialysis days T/TH/SAT: pt states dialysis last completed on tuesday. Pt has L AV fistula + bruit and thrill. Pt has L BKA with prosthetic at bedside. Will continue to monitor for safety and comfort. Pt received into trauma room C as rapid response from [facility] [place]: pt visiting son in PICU and suddenly felt nauseous, started vomiting and became diaphoretic. Pt A&Ox3, appears weak and tired, moaning on assessment. Pt denies CP, SOB, diarrhea, fevers, chills. MD evaluated, VS as noted. 22 G IV placed to R wrist, labs sent, medicated as ordered. Pt has PMH ESRD on dialysis; normal dialysis days T/TH/SAT: pt states dialysis last completed on tuesday. Pt has L AV fistula + bruit and thrill. Pt has L BKA with prosthetic at bedside. Pt blind in right eye. Will continue to monitor for safety and comfort. Pt received into trauma room C as rapid response from [facility] [place]: pt visiting son in PICU and suddenly felt nauseous, started vomiting and became diaphoretic. Pt A&Ox3: confused about situation and cannot give HPI; pt appears weak and tired, moaning on assessment. Pt denies CP, SOB, diarrhea, fevers, chills. MD evaluated, VS as noted. 22 G IV placed to R wrist, labs sent, medicated as ordered. Pt has PMH ESRD on dialysis; normal dialysis days T/TH/SAT: pt states dialysis last completed on Tuesday. Pt has L AV fistula + bruit and thrill. Pt has L BKA with prosthetic at bedside. Pt blind in right eye. Will continue to monitor for safety and comfort.

## 2018-03-29 NOTE — H&P ADULT - RS GEN PE MLT RESP DETAILS PC
airway patent/breath sounds equal/respirations non-labored/clear to auscultation bilaterally/no chest wall tenderness airway patent/breath sounds equal/good air movement/respirations non-labored/clear to auscultation bilaterally/no chest wall tenderness

## 2018-03-30 DIAGNOSIS — G92 TOXIC ENCEPHALOPATHY: ICD-10-CM

## 2018-03-30 LAB
BUN SERPL-MCNC: 53 MG/DL — HIGH (ref 7–23)
CA-I BLD-SCNC: 0.9 MMOL/L — LOW (ref 1.03–1.23)
CALCIUM SERPL-MCNC: 7.4 MG/DL — LOW (ref 8.4–10.5)
CHLORIDE SERPL-SCNC: 87 MMOL/L — LOW (ref 98–107)
CO2 SERPL-SCNC: 24 MMOL/L — SIGNIFICANT CHANGE UP (ref 22–31)
CREAT SERPL-MCNC: 5.92 MG/DL — HIGH (ref 0.5–1.3)
FOLATE SERPL-MCNC: 8.8 NG/ML — SIGNIFICANT CHANGE UP (ref 4.7–20)
GLUCOSE SERPL-MCNC: 199 MG/DL — HIGH (ref 70–99)
HCT VFR BLD CALC: 33.2 % — LOW (ref 34.5–45)
HGB BLD-MCNC: 10.8 G/DL — LOW (ref 11.5–15.5)
MAGNESIUM SERPL-MCNC: 2.1 MG/DL — SIGNIFICANT CHANGE UP (ref 1.6–2.6)
MCHC RBC-ENTMCNC: 29.5 PG — SIGNIFICANT CHANGE UP (ref 27–34)
MCHC RBC-ENTMCNC: 32.5 % — SIGNIFICANT CHANGE UP (ref 32–36)
MCV RBC AUTO: 90.7 FL — SIGNIFICANT CHANGE UP (ref 80–100)
NRBC # FLD: 0 — SIGNIFICANT CHANGE UP
PHOSPHATE SERPL-MCNC: 5.6 MG/DL — HIGH (ref 2.5–4.5)
PLATELET # BLD AUTO: 177 K/UL — SIGNIFICANT CHANGE UP (ref 150–400)
PMV BLD: 10.4 FL — SIGNIFICANT CHANGE UP (ref 7–13)
POTASSIUM SERPL-MCNC: 5 MMOL/L — SIGNIFICANT CHANGE UP (ref 3.5–5.3)
POTASSIUM SERPL-SCNC: 5 MMOL/L — SIGNIFICANT CHANGE UP (ref 3.5–5.3)
PTH-INTACT SERPL-MCNC: 218.5 PG/ML — HIGH (ref 15–65)
RBC # BLD: 3.66 M/UL — LOW (ref 3.8–5.2)
RBC # FLD: 13.4 % — SIGNIFICANT CHANGE UP (ref 10.3–14.5)
SODIUM SERPL-SCNC: 132 MMOL/L — LOW (ref 135–145)
T PALLIDUM AB TITR SER: NEGATIVE — SIGNIFICANT CHANGE UP
VIT B12 SERPL-MCNC: 474 PG/ML — SIGNIFICANT CHANGE UP (ref 200–900)
WBC # BLD: 8.32 K/UL — SIGNIFICANT CHANGE UP (ref 3.8–10.5)
WBC # FLD AUTO: 8.32 K/UL — SIGNIFICANT CHANGE UP (ref 3.8–10.5)

## 2018-03-30 PROCEDURE — 99233 SBSQ HOSP IP/OBS HIGH 50: CPT

## 2018-03-30 PROCEDURE — 90935 HEMODIALYSIS ONE EVALUATION: CPT

## 2018-03-30 PROCEDURE — 90792 PSYCH DIAG EVAL W/MED SRVCS: CPT

## 2018-03-30 RX ORDER — CALCITRIOL 0.5 UG/1
0.25 CAPSULE ORAL DAILY
Refills: 0 | Status: DISCONTINUED | OUTPATIENT
Start: 2018-03-30 | End: 2018-04-07

## 2018-03-30 RX ORDER — NIFEDIPINE 30 MG
30 TABLET, EXTENDED RELEASE 24 HR ORAL DAILY
Refills: 0 | Status: DISCONTINUED | OUTPATIENT
Start: 2018-03-30 | End: 2018-04-10

## 2018-03-30 RX ORDER — OXYCODONE HYDROCHLORIDE 5 MG/1
5 TABLET ORAL EVERY 8 HOURS
Refills: 0 | Status: DISCONTINUED | OUTPATIENT
Start: 2018-03-30 | End: 2018-04-02

## 2018-03-30 RX ORDER — OXYCODONE HYDROCHLORIDE 5 MG/1
5 TABLET ORAL ONCE
Refills: 0 | Status: DISCONTINUED | OUTPATIENT
Start: 2018-03-30 | End: 2018-03-30

## 2018-03-30 RX ADMIN — Medication 30 MILLIGRAM(S): at 14:38

## 2018-03-30 RX ADMIN — Medication 2001 MILLIGRAM(S): at 17:57

## 2018-03-30 RX ADMIN — Medication 200 MILLIGRAM(S): at 06:38

## 2018-03-30 RX ADMIN — AMLODIPINE BESYLATE 10 MILLIGRAM(S): 2.5 TABLET ORAL at 07:58

## 2018-03-30 RX ADMIN — HALOPERIDOL DECANOATE 1 MILLIGRAM(S): 100 INJECTION INTRAMUSCULAR at 22:25

## 2018-03-30 RX ADMIN — OXCARBAZEPINE 300 MILLIGRAM(S): 300 TABLET, FILM COATED ORAL at 17:57

## 2018-03-30 RX ADMIN — ATORVASTATIN CALCIUM 20 MILLIGRAM(S): 80 TABLET, FILM COATED ORAL at 22:25

## 2018-03-30 RX ADMIN — Medication 650 MILLIGRAM(S): at 02:04

## 2018-03-30 RX ADMIN — OXYCODONE HYDROCHLORIDE 5 MILLIGRAM(S): 5 TABLET ORAL at 14:38

## 2018-03-30 RX ADMIN — Medication 14 UNIT(S): at 13:52

## 2018-03-30 RX ADMIN — OXCARBAZEPINE 300 MILLIGRAM(S): 300 TABLET, FILM COATED ORAL at 05:51

## 2018-03-30 RX ADMIN — Medication 2001 MILLIGRAM(S): at 13:52

## 2018-03-30 RX ADMIN — Medication 650 MILLIGRAM(S): at 02:35

## 2018-03-30 RX ADMIN — Medication 2001 MILLIGRAM(S): at 09:28

## 2018-03-30 RX ADMIN — Medication 14 UNIT(S): at 17:57

## 2018-03-30 RX ADMIN — HEPARIN SODIUM 5000 UNIT(S): 5000 INJECTION INTRAVENOUS; SUBCUTANEOUS at 13:52

## 2018-03-30 RX ADMIN — HEPARIN SODIUM 5000 UNIT(S): 5000 INJECTION INTRAVENOUS; SUBCUTANEOUS at 22:25

## 2018-03-30 RX ADMIN — OXYCODONE HYDROCHLORIDE 5 MILLIGRAM(S): 5 TABLET ORAL at 08:55

## 2018-03-30 RX ADMIN — Medication 100 MILLIGRAM(S): at 13:52

## 2018-03-30 RX ADMIN — INSULIN GLARGINE 14 UNIT(S): 100 INJECTION, SOLUTION SUBCUTANEOUS at 23:18

## 2018-03-30 RX ADMIN — Medication 6: at 13:51

## 2018-03-30 RX ADMIN — OXYCODONE HYDROCHLORIDE 5 MILLIGRAM(S): 5 TABLET ORAL at 15:10

## 2018-03-30 RX ADMIN — Medication 100 MILLIGRAM(S): at 22:25

## 2018-03-30 RX ADMIN — Medication 14 UNIT(S): at 09:19

## 2018-03-30 RX ADMIN — OXYCODONE HYDROCHLORIDE 5 MILLIGRAM(S): 5 TABLET ORAL at 23:04

## 2018-03-30 RX ADMIN — Medication 2: at 09:16

## 2018-03-30 RX ADMIN — CALCITRIOL 0.25 MICROGRAM(S): 0.5 CAPSULE ORAL at 14:38

## 2018-03-30 RX ADMIN — OXYCODONE HYDROCHLORIDE 5 MILLIGRAM(S): 5 TABLET ORAL at 08:18

## 2018-03-30 RX ADMIN — HEPARIN SODIUM 5000 UNIT(S): 5000 INJECTION INTRAVENOUS; SUBCUTANEOUS at 05:51

## 2018-03-30 RX ADMIN — OXYCODONE HYDROCHLORIDE 5 MILLIGRAM(S): 5 TABLET ORAL at 23:34

## 2018-03-30 NOTE — PROGRESS NOTE ADULT - PROBLEM SELECTOR PLAN 1
Pt. tolerating HD. BP elevated during HD rounds. UF goal increased. AVF functioning well. Monitor BP and labs. Low salt and low potassium diet advised

## 2018-03-30 NOTE — DIETITIAN INITIAL EVALUATION ADULT. - NS AS NUTRI INTERV MEALS SNACK
1. Suggest: PO diet rx: Regular, Renal Replacement (no prot restr, no conc K, no conc phos, low sodium); Consistent Carbohydrate (no snacks);               2. Encourage & assist Pt with meals; Monitor PO diet tolerance;            3. Monitor labs, weights, hydration status;           4. Suggest outpatient follow up with an endocrinologist to ensure long-term DM diet comprehension and compliance. Suggest outpatient follow up with appropriate RD for the purposes of long-term nutrition evaluation and diet education;/Diets modified for specific foods and ingredients/Other (specify)

## 2018-03-30 NOTE — DIETITIAN INITIAL EVALUATION ADULT. - PERTINENT LABORATORY DATA
(3/30) H/H 10.8/33.2 L, Phosphorus 5.6 H, Na 132 L, Cl 87 L, BUN 53 H, Creat 5.92 H, Glu 199 H;         (3/29) Albumin 4.1;      (3/27) HbA1c 7.7% H

## 2018-03-30 NOTE — DIETITIAN INITIAL EVALUATION ADULT. - OTHER INFO
Nutrition Consult X Hyperkalemia, ESRD patient, dietary noncompliance. Pt 36 yo female with ESRD on dialysis; Pt appears alert, oriented. Per Pt her appetite okay; No chew/swallow problem voiced; no nausea/vomiting/diarrhea reported @ present. But Pt vomited yesterday reported. Per Pt her current dry weight: 90 Kg; Pt also stated her dry weight fluctuates ~3-5#. Of note Pt's HbA1c level 7.7% (3/27). Pt's diet order includes Consistent Carbohydrate, Renal restrictions. Prescribed diet discussed with Pt includes better food choices, foods to avoid. RDN answered questions/concerns related to diet; written materials on diet also provided. RDN remains available, Pt made aware.

## 2018-03-30 NOTE — PROGRESS NOTE ADULT - PROBLEM SELECTOR PLAN 2
- Hyperkalemia now resolved s/p urgent HD, repeat session this morning, tolerated well  - Pt is scheduled for routine session tomorrow  - Will need SW evaluation re: transportation issues to and from HD   - As per chart review, pt is known to be noncompliant with dietary intake as well, will have dietician meet with patient  - Will d/c Enalapril 2/2 hyperkalemia

## 2018-03-30 NOTE — PROGRESS NOTE ADULT - SUBJECTIVE AND OBJECTIVE BOX
[facility] DIVISION OF KIDNEY DISEASES AND HYPERTENSION --   --------------------------------------------------------------------------------  Chief Complaint: ESRD/Ongoing hemodialysis requirement    HPI: 37-year-old female with ESRD on HD TIW (TTS), DM, HTN, and left BKA admitted for acute SOB and severe hyperkalemia. Pt. with recent hospital stay at [facility] (3/26-3/27) during which she received urgent HD in the MICU for severe hyperkalemia. Pt. missed her outpatient HD session yesterday secondary to transportation issues. Pt. has been on HD for two years and her last outpatient dialysis was on Tuesday 3/27 at [facility] prior to her discharge. Pt.'s outpatient nephrologist is Dr. Goldberg in [place]. Pt. received urgent HD yesterday and tolerated it well. Pt. seen and examined during HD today. Pt. feels better and her SOB has improved.     PAST HISTORY  --------------------------------------------------------------------------------  No significant changes to PMH, PSH, FHx, SHx, unless otherwise noted    ALLERGIES & MEDICATIONS  --------------------------------------------------------------------------------  Allergies    No Known Allergies    Intolerances    Standing Inpatient Medications  amLODIPine   Tablet 10 milliGRAM(s) Oral daily  atorvastatin 20 milliGRAM(s) Oral at bedtime  calcium acetate 2001 milliGRAM(s) Oral three times a day with meals  dextrose 5%. 1000 milliLiter(s) IV Continuous <Continuous>  dextrose 50% Injectable 12.5 Gram(s) IV Push once  dextrose 50% Injectable 25 Gram(s) IV Push once  dextrose 50% Injectable 25 Gram(s) IV Push once  enalapril 10 milliGRAM(s) Oral daily  haloperidol     Tablet 1 milliGRAM(s) Oral at bedtime  heparin  Injectable 5000 Unit(s) SubCutaneous every 8 hours  insulin glargine Injectable (LANTUS) 14 Unit(s) SubCutaneous at bedtime  insulin lispro (HumaLOG) corrective regimen sliding scale   SubCutaneous three times a day before meals  insulin lispro (HumaLOG) corrective regimen sliding scale   SubCutaneous at bedtime  insulin lispro Injectable (HumaLOG) 14 Unit(s) SubCutaneous three times a day before meals  metoprolol tartrate 100 milliGRAM(s) Oral two times a day  OXcarbazepine 300 milliGRAM(s) Oral two times a day    PRN Inpatient Medications  acetaminophen   Tablet. 650 milliGRAM(s) Oral every 6 hours PRN  dextrose Gel 1 Dose(s) Oral once PRN  glucagon  Injectable 1 milliGRAM(s) IntraMuscular once PRN  haloperidol     Tablet 2 milliGRAM(s) Oral every 6 hours PRN  haloperidol    Injectable 2 milliGRAM(s) IV Push every 6 hours PRN  haloperidol    Injectable 2 milliGRAM(s) IntraMuscular every 6 hours PRN      REVIEW OF SYSTEMS  --------------------------------------------------------------------------------  Gen: No fever  Respiratory: See HPI  CV: No chest pain  GI: No abdominal pain  Neuro: No dizziness    VITALS/PHYSICAL EXAM  --------------------------------------------------------------------------------  T(C): 37.3 (03-30-18 @ 06:30), Max: 37.3 (03-30-18 @ 06:30)  HR: 74 (03-30-18 @ 05:44) (65 - 87)  BP: 196/98 (03-30-18 @ 05:44) (140/66 - 196/98)  RR: 18 (03-30-18 @ 05:44) (16 - 18)  SpO2: 100% (03-30-18 @ 05:44) (99% - 100%)  Wt(kg): --  Height (cm): 167.64 (03-29-18 @ 10:37)  Weight (kg): 90.7 (03-29-18 @ 10:37)  BMI (kg/m2): 32.3 (03-29-18 @ 10:37)  BSA (m2): 2 (03-29-18 @ 10:37)    03-29-18 @ 07:01  -  03-30-18 @ 07:00  --------------------------------------------------------  IN: 400 mL / OUT: 3000 mL / NET: -2600 mL    Physical Exam:  	Gen: NAD  	HEENT: No JVD  	Pulm: fair air entry B/L  	CV: S1S2+  	Abd: soft  	LE: No edema  	Vascular access: LUE AVF: skin intact  LABS/STUDIES  --------------------------------------------------------------------------------              11.1   10.40 >-----------<  215      [03-29-18 @ 06:25]              34.0     131  |  86  |  79  ----------------------------<  429      [03-29-18 @ 09:37]  5.8   |  26  |  7.44        Ca     7.0     [03-29-18 @ 09:37]      Phos  8.7     [03-29-18 @ 09:37]    TPro  7.6  /  Alb  4.1  /  TBili  0.3  /  DBili  x   /  AST  11  /  ALT  27  /  AlkPhos  83  [03-29-18 @ 09:37]    HBsAb 31.9      [03-26-18 @ 16:50]  HBsAg NEGATIVE      [03-26-18 @ 16:50]  HBcAb Reactive      [03-26-18 @ 16:50]  HCV 0.20, Nonreactive Hepatitis C AB  S/CO Ratio                        Interpretation  < 1.0                                     Non-Reactive  1.0 - 4.9                           Weakly-Reactive  > 5.0                                 Reactive  Non-Reactive: Aperson with a non-reactive HCV antibody  result is considered uninfected.  No further action is  needed unless recent infection is suspected.  In these  cases, consider repeat testing later to detect  seroconversion..  Weakly-Reactive: HCV antibody test is abnormal, HCV RNA  Qualitative test will follow.  Reactive: HCV antibody test is abnormal, HCV RNA  Qualitative test will follow.  Note: HCV antibody testing is performed on the [employer]   system.      [03-26-18 @ 16:50]

## 2018-03-30 NOTE — PROGRESS NOTE ADULT - PROBLEM SELECTOR PLAN 4
- Suboptimally controlled  - D/C Enalapril for hyperkalemia, d/c norvasc and start Procardia and monitor BP

## 2018-03-30 NOTE — PROGRESS NOTE ADULT - PROBLEM SELECTOR PLAN 3
- Appreciate renal input  - Resume phoslo, renal diet   - SW evaluation for transportation needs to and from HD

## 2018-03-30 NOTE — DIETITIAN INITIAL EVALUATION ADULT. - DIET TYPE
consistent carbohydrate (no snacks)/DASH/TLC (sodium and cholesterol restricted diet)/low fat/renal replacement pts:no protein restr,no conc K & phos, low sodium

## 2018-03-30 NOTE — PROGRESS NOTE ADULT - SUBJECTIVE AND OBJECTIVE BOX
Patient is a 37y old  Female who presents with a chief complaint of Hyperkalemia (29 Mar 2018 10:37)      SUBJECTIVE / OVERNIGHT EVENTS: Pt so far tolerated HD x2 sessions.  Mental status much improved, and pt is able to tell me that she took too many Lyrica pills (instead of 200mg, she took 500mg), because she ran out of Oxycodone at home.  She typically has a bad reaction to Lyrica where she feels very dizzy and disoriented. Pt admits to being addicted to Oxycodone and she typically takes up to 30mg every 4 hours and has tolerated such high doses.  She has tried to wean herself off of Oxycodone, but nothing else has helped her.  She has also been missing her HD sessions because her transportation doesn't arrive, especially on Thursdays       MEDICATIONS  (STANDING):  amLODIPine   Tablet 10 milliGRAM(s) Oral daily  atorvastatin 20 milliGRAM(s) Oral at bedtime  calcium acetate 2001 milliGRAM(s) Oral three times a day with meals  dextrose 5%. 1000 milliLiter(s) (50 mL/Hr) IV Continuous <Continuous>  dextrose 50% Injectable 12.5 Gram(s) IV Push once  dextrose 50% Injectable 25 Gram(s) IV Push once  dextrose 50% Injectable 25 Gram(s) IV Push once  enalapril 10 milliGRAM(s) Oral daily  haloperidol     Tablet 1 milliGRAM(s) Oral at bedtime  heparin  Injectable 5000 Unit(s) SubCutaneous every 8 hours  insulin glargine Injectable (LANTUS) 14 Unit(s) SubCutaneous at bedtime  insulin lispro (HumaLOG) corrective regimen sliding scale   SubCutaneous three times a day before meals  insulin lispro (HumaLOG) corrective regimen sliding scale   SubCutaneous at bedtime  insulin lispro Injectable (HumaLOG) 14 Unit(s) SubCutaneous three times a day before meals  metoprolol tartrate 100 milliGRAM(s) Oral two times a day  OXcarbazepine 300 milliGRAM(s) Oral two times a day    MEDICATIONS  (PRN):  acetaminophen   Tablet. 650 milliGRAM(s) Oral every 6 hours PRN mild, moderate pain  dextrose Gel 1 Dose(s) Oral once PRN Blood Glucose LESS THAN 70 milliGRAM(s)/deciliter  glucagon  Injectable 1 milliGRAM(s) IntraMuscular once PRN Glucose LESS THAN 70 milligrams/deciliter  haloperidol     Tablet 2 milliGRAM(s) Oral every 6 hours PRN Agitation  haloperidol    Injectable 2 milliGRAM(s) IV Push every 6 hours PRN Agitation  haloperidol    Injectable 2 milliGRAM(s) IntraMuscular every 6 hours PRN Agitation  oxyCODONE    IR 5 milliGRAM(s) Oral every 8 hours PRN For moderate to severe pain      Vital Signs Last 24 Hrs  T(C): 37.2 (30 Mar 2018 09:57), Max: 37.3 (30 Mar 2018 06:30)  T(F): 99 (30 Mar 2018 09:57), Max: 99.1 (30 Mar 2018 06:30)  HR: 66 (30 Mar 2018 09:57) (66 - 87)  BP: 161/82 (30 Mar 2018 09:57) (140/66 - 196/98)  BP(mean): --  RR: 18 (30 Mar 2018 09:57) (16 - 18)  SpO2: 100% (30 Mar 2018 05:44) (99% - 100%)  CAPILLARY BLOOD GLUCOSE      POCT Blood Glucose.: 197 mg/dL (30 Mar 2018 08:35)  POCT Blood Glucose.: 205 mg/dL (30 Mar 2018 05:52)  POCT Blood Glucose.: 179 mg/dL (29 Mar 2018 21:06)  POCT Blood Glucose.: 126 mg/dL (29 Mar 2018 17:38)  POCT Blood Glucose.: 216 mg/dL (29 Mar 2018 12:24)    I&O's Summary    29 Mar 2018 07:01  -  30 Mar 2018 07:00  --------------------------------------------------------  IN: 400 mL / OUT: 3000 mL / NET: -2600 mL    30 Mar 2018 07:01  -  30 Mar 2018 11:39  --------------------------------------------------------  IN: 700 mL / OUT: 3300 mL / NET: -2600 mL      PHYSICAL EXAM  GENERAL: NAD, well-developed  HEAD:  Atraumatic, Normocephalic  EYES: EOMI, PERRLA, conjunctiva and sclera clear  NECK: Supple, No JVD  CHEST/LUNG: Clear to auscultation bilaterally; No wheeze  HEART: Regular rate and rhythm; No murmurs, rubs, or gallops  ABDOMEN: Soft, Nontender, Nondistended; Bowel sounds present  EXTREMITIES:  2+ Peripheral Pulses, No clubbing, cyanosis, or edema  PSYCH: AAOx3  SKIN: No rashes or lesions    LABS:                        10.8   8.32  )-----------( 177      ( 30 Mar 2018 10:15 )             33.2     03-30    132<L>  |  87<L>  |  53<H>  ----------------------------<  199<H>  5.0   |  24  |  5.92<H>    Ca    7.4<L>      30 Mar 2018 06:00  Phos  5.6     03-30  Mg     2.1     03-30    TPro  7.6  /  Alb  4.1  /  TBili  0.3  /  DBili  x   /  AST  11  /  ALT  27  /  AlkPhos  83  03-29    PT/INR - ( 29 Mar 2018 06:25 )   PT: 12.2 SEC;   INR: 1.06          PTT - ( 29 Mar 2018 06:25 )  PTT:38.5 SEC  CARDIAC MARKERS ( 29 Mar 2018 06:25 )  x     / 0.09 ng/mL / x     / x     / x              RADIOLOGY & ADDITIONAL TESTS:    Imaging Personally Reviewed:  < from: Xray Chest 1 View- PORTABLE-Urgent (03.29.18 @ 07:03) >  IMPRESSION:   Clear lungs.        < end of copied text >    Consultant(s) Notes Reviewed:  Renal

## 2018-03-30 NOTE — PROGRESS NOTE ADULT - PROBLEM SELECTOR PLAN 1
- Now resolved, pt's mental status back to baseline, pt seen brushing teeth and bathing self this morning, fully oriented  - Pt admits to consuming extra doses of Lyrica prior to admission because she ran out of Oxycodone  - Consider pain management consult to assist with weaning off opiates entirely  - Will continue to hold Lyrica   - Pt normally takes Oxycodone 30mg q4 hrs; she is heavily dependent for 3 years now 2/2 chronic back pain. ISTOP reference # [id number]  - Will resume Oxycodone 5mg q8 hours for now, pt in agreement   - No indication for CT head at this point; if acute change in mental status, will revisit CT head  - Psych following; strongly suspect a degree of depression - pt's daughter currently at [facility]'s ICU s/p open heart surgery due to complications of an infected valve.

## 2018-03-31 LAB
BUN SERPL-MCNC: 66 MG/DL — HIGH (ref 7–23)
CALCIUM SERPL-MCNC: 7.7 MG/DL — LOW (ref 8.4–10.5)
CHLORIDE SERPL-SCNC: 86 MMOL/L — LOW (ref 98–107)
CO2 SERPL-SCNC: 26 MMOL/L — SIGNIFICANT CHANGE UP (ref 22–31)
CREAT SERPL-MCNC: 6.13 MG/DL — HIGH (ref 0.5–1.3)
GLUCOSE SERPL-MCNC: 196 MG/DL — HIGH (ref 70–99)
HCT VFR BLD CALC: 33 % — LOW (ref 34.5–45)
HGB BLD-MCNC: 11 G/DL — LOW (ref 11.5–15.5)
MAGNESIUM SERPL-MCNC: 2.1 MG/DL — SIGNIFICANT CHANGE UP (ref 1.6–2.6)
MCHC RBC-ENTMCNC: 29.6 PG — SIGNIFICANT CHANGE UP (ref 27–34)
MCHC RBC-ENTMCNC: 33.3 % — SIGNIFICANT CHANGE UP (ref 32–36)
MCV RBC AUTO: 88.9 FL — SIGNIFICANT CHANGE UP (ref 80–100)
NRBC # FLD: 0 — SIGNIFICANT CHANGE UP
PHOSPHATE SERPL-MCNC: 5.8 MG/DL — HIGH (ref 2.5–4.5)
PLATELET # BLD AUTO: 189 K/UL — SIGNIFICANT CHANGE UP (ref 150–400)
PMV BLD: 10.3 FL — SIGNIFICANT CHANGE UP (ref 7–13)
POTASSIUM SERPL-MCNC: 4.9 MMOL/L — SIGNIFICANT CHANGE UP (ref 3.5–5.3)
POTASSIUM SERPL-SCNC: 4.9 MMOL/L — SIGNIFICANT CHANGE UP (ref 3.5–5.3)
RBC # BLD: 3.71 M/UL — LOW (ref 3.8–5.2)
RBC # FLD: 13.3 % — SIGNIFICANT CHANGE UP (ref 10.3–14.5)
SODIUM SERPL-SCNC: 132 MMOL/L — LOW (ref 135–145)
WBC # BLD: 8.3 K/UL — SIGNIFICANT CHANGE UP (ref 3.8–10.5)
WBC # FLD AUTO: 8.3 K/UL — SIGNIFICANT CHANGE UP (ref 3.8–10.5)

## 2018-03-31 PROCEDURE — 99233 SBSQ HOSP IP/OBS HIGH 50: CPT

## 2018-03-31 PROCEDURE — 90935 HEMODIALYSIS ONE EVALUATION: CPT

## 2018-03-31 RX ADMIN — Medication 2001 MILLIGRAM(S): at 18:14

## 2018-03-31 RX ADMIN — OXYCODONE HYDROCHLORIDE 5 MILLIGRAM(S): 5 TABLET ORAL at 07:07

## 2018-03-31 RX ADMIN — HEPARIN SODIUM 5000 UNIT(S): 5000 INJECTION INTRAVENOUS; SUBCUTANEOUS at 13:19

## 2018-03-31 RX ADMIN — HEPARIN SODIUM 5000 UNIT(S): 5000 INJECTION INTRAVENOUS; SUBCUTANEOUS at 21:14

## 2018-03-31 RX ADMIN — Medication 100 MILLIGRAM(S): at 18:14

## 2018-03-31 RX ADMIN — Medication 14 UNIT(S): at 13:18

## 2018-03-31 RX ADMIN — ATORVASTATIN CALCIUM 20 MILLIGRAM(S): 80 TABLET, FILM COATED ORAL at 21:14

## 2018-03-31 RX ADMIN — OXYCODONE HYDROCHLORIDE 5 MILLIGRAM(S): 5 TABLET ORAL at 07:45

## 2018-03-31 RX ADMIN — Medication 4: at 13:18

## 2018-03-31 RX ADMIN — OXCARBAZEPINE 300 MILLIGRAM(S): 300 TABLET, FILM COATED ORAL at 18:14

## 2018-03-31 RX ADMIN — Medication 14 UNIT(S): at 09:22

## 2018-03-31 RX ADMIN — INSULIN GLARGINE 14 UNIT(S): 100 INJECTION, SOLUTION SUBCUTANEOUS at 22:11

## 2018-03-31 RX ADMIN — Medication 2001 MILLIGRAM(S): at 13:19

## 2018-03-31 RX ADMIN — OXCARBAZEPINE 300 MILLIGRAM(S): 300 TABLET, FILM COATED ORAL at 06:09

## 2018-03-31 RX ADMIN — Medication 2: at 18:14

## 2018-03-31 RX ADMIN — HEPARIN SODIUM 5000 UNIT(S): 5000 INJECTION INTRAVENOUS; SUBCUTANEOUS at 06:09

## 2018-03-31 RX ADMIN — Medication 2: at 09:20

## 2018-03-31 RX ADMIN — OXYCODONE HYDROCHLORIDE 5 MILLIGRAM(S): 5 TABLET ORAL at 16:22

## 2018-03-31 RX ADMIN — OXYCODONE HYDROCHLORIDE 5 MILLIGRAM(S): 5 TABLET ORAL at 17:10

## 2018-03-31 RX ADMIN — HALOPERIDOL DECANOATE 1 MILLIGRAM(S): 100 INJECTION INTRAMUSCULAR at 21:14

## 2018-03-31 RX ADMIN — CALCITRIOL 0.25 MICROGRAM(S): 0.5 CAPSULE ORAL at 13:19

## 2018-03-31 RX ADMIN — Medication 2001 MILLIGRAM(S): at 09:27

## 2018-03-31 RX ADMIN — Medication 14 UNIT(S): at 18:14

## 2018-03-31 NOTE — PROGRESS NOTE ADULT - PROBLEM SELECTOR PLAN 1
Pt. tolerating HD well. BP stable during HD rounds. Continue with current UF goal as tolerated. AVF functioning well. Monitor BP and labs. Low salt and low potassium diet advised

## 2018-03-31 NOTE — PROGRESS NOTE ADULT - PROBLEM SELECTOR PLAN 2
- Hyperkalemia now resolved s/p urgent HD  - Will need SW evaluation re: transportation issues to and from HD   - As per chart review, pt is known to be noncompliant with dietary intake as well, will have dietician meet with patient  - Will d/c Enalapril 2/2 hyperkalemia

## 2018-03-31 NOTE — PROGRESS NOTE ADULT - SUBJECTIVE AND OBJECTIVE BOX
Patient is a 37y old  Female who presents with a chief complaint of Hyperkalemia (29 Mar 2018 10:37)      SUBJECTIVE / OVERNIGHT EVENTS:  pt denies pain but still feels sob and is unable to lay flat.  denies cough/fevers.     MEDICATIONS  (STANDING):  atorvastatin 20 milliGRAM(s) Oral at bedtime  calcitriol   Capsule 0.25 MICROGram(s) Oral daily  calcium acetate 2001 milliGRAM(s) Oral three times a day with meals  dextrose 5%. 1000 milliLiter(s) (50 mL/Hr) IV Continuous <Continuous>  dextrose 50% Injectable 12.5 Gram(s) IV Push once  dextrose 50% Injectable 25 Gram(s) IV Push once  dextrose 50% Injectable 25 Gram(s) IV Push once  haloperidol     Tablet 1 milliGRAM(s) Oral at bedtime  heparin  Injectable 5000 Unit(s) SubCutaneous every 8 hours  insulin glargine Injectable (LANTUS) 14 Unit(s) SubCutaneous at bedtime  insulin lispro (HumaLOG) corrective regimen sliding scale   SubCutaneous three times a day before meals  insulin lispro (HumaLOG) corrective regimen sliding scale   SubCutaneous at bedtime  insulin lispro Injectable (HumaLOG) 14 Unit(s) SubCutaneous three times a day before meals  metoprolol tartrate 100 milliGRAM(s) Oral two times a day  NIFEdipine XL 30 milliGRAM(s) Oral daily  OXcarbazepine 300 milliGRAM(s) Oral two times a day    MEDICATIONS  (PRN):  acetaminophen   Tablet. 650 milliGRAM(s) Oral every 6 hours PRN mild, moderate pain  dextrose Gel 1 Dose(s) Oral once PRN Blood Glucose LESS THAN 70 milliGRAM(s)/deciliter  glucagon  Injectable 1 milliGRAM(s) IntraMuscular once PRN Glucose LESS THAN 70 milligrams/deciliter  haloperidol     Tablet 2 milliGRAM(s) Oral every 6 hours PRN Agitation  haloperidol    Injectable 2 milliGRAM(s) IV Push every 6 hours PRN Agitation  haloperidol    Injectable 2 milliGRAM(s) IntraMuscular every 6 hours PRN Agitation  oxyCODONE    IR 5 milliGRAM(s) Oral every 8 hours PRN For moderate to severe pain      Vital Signs Last 24 Hrs  T(C): 36.7 (31 Mar 2018 06:40), Max: 37.4 (30 Mar 2018 13:47)  T(F): 98.1 (31 Mar 2018 06:40), Max: 99.3 (30 Mar 2018 13:47)  HR: 69 (31 Mar 2018 06:40) (69 - 80)  BP: 116/98 (31 Mar 2018 06:40) (103/86 - 179/86)  BP(mean): --  RR: 18 (31 Mar 2018 06:40) (18 - 20)  SpO2: 99% (31 Mar 2018 05:33) (99% - 100%)  CAPILLARY BLOOD GLUCOSE      POCT Blood Glucose.: 200 mg/dL (31 Mar 2018 09:00)  POCT Blood Glucose.: 182 mg/dL (31 Mar 2018 06:13)  POCT Blood Glucose.: 177 mg/dL (30 Mar 2018 23:15)  POCT Blood Glucose.: 121 mg/dL (30 Mar 2018 17:31)  POCT Blood Glucose.: 271 mg/dL (30 Mar 2018 13:04)    I&O's Summary    30 Mar 2018 07:01  -  31 Mar 2018 07:00  --------------------------------------------------------  IN: 700 mL / OUT: 3300 mL / NET: -2600 mL        PHYSICAL EXAM:  GENERAL: NAD, well-developed  HEAD:  Atraumatic, Normocephalic  EYES: EOMI, conjunctiva and sclera clear  NECK: Supple, No JVD  CHEST/LUNG: Bibasilar crackles; No wheeze  HEART: Regular rate and rhythm; No murmurs  ABDOMEN: Soft, Nontender, Nondistended; Bowel sounds present  EXTREMITIES:  2+ Peripheral Pulses, No edema  PSYCH: AAOx3  NEUROLOGY: non-focal  SKIN: No rashes or lesions    LABS:                        11.0   8.30  )-----------( 189      ( 31 Mar 2018 06:30 )             33.0     03-31    132<L>  |  86<L>  |  66<H>  ----------------------------<  196<H>  4.9   |  26  |  6.13<H>    Ca    7.7<L>      31 Mar 2018 06:30  Phos  5.8     03-31  Mg     2.1     03-31

## 2018-03-31 NOTE — PROGRESS NOTE ADULT - PROBLEM SELECTOR PLAN 3
- Appreciate renal input  - Resume phoslo, renal diet   - fluid overload is improving now - s/p over 7 L removed at HD over the last 3 days  - follow up with renal regarding further HD sessions in   -  evaluation for transportation needs to and from HD

## 2018-03-31 NOTE — PROGRESS NOTE ADULT - SUBJECTIVE AND OBJECTIVE BOX
[facility] DIVISION OF KIDNEY DISEASES AND HYPERTENSION --   --------------------------------------------------------------------------------  Chief Complaint: ESRD/Ongoing hemodialysis requirement    HPI: 37-year-old female with ESRD on HD TIW (TTS), DM, HTN, and left BKA admitted for acute SOB and severe hyperkalemia. Pt. with recent hospital stay at [facility] (3/26-3/27) during which she received urgent HD in the MICU for severe hyperkalemia. Pt. missed her outpatient HD session on 3/29/18 secondary to transportation issues. Pt. has been on HD for two years. Pt.'s outpatient nephrologist is Dr. Goldberg in [place]. Pt. received HD yesterday and tolerated it well. Pt. seen and examined during HD today. Pt. feels better and her SOB has improved.     PAST HISTORY  --------------------------------------------------------------------------------  No significant changes to PMH, PSH, FHx, SHx, unless otherwise noted    ALLERGIES & MEDICATIONS  --------------------------------------------------------------------------------  Allergies    No Known Allergies    Intolerances    Standing Inpatient Medications  atorvastatin 20 milliGRAM(s) Oral at bedtime  calcitriol   Capsule 0.25 MICROGram(s) Oral daily  calcium acetate 2001 milliGRAM(s) Oral three times a day with meals  dextrose 5%. 1000 milliLiter(s) IV Continuous <Continuous>  dextrose 50% Injectable 12.5 Gram(s) IV Push once  dextrose 50% Injectable 25 Gram(s) IV Push once  dextrose 50% Injectable 25 Gram(s) IV Push once  haloperidol     Tablet 1 milliGRAM(s) Oral at bedtime  heparin  Injectable 5000 Unit(s) SubCutaneous every 8 hours  insulin glargine Injectable (LANTUS) 14 Unit(s) SubCutaneous at bedtime  insulin lispro (HumaLOG) corrective regimen sliding scale   SubCutaneous three times a day before meals  insulin lispro (HumaLOG) corrective regimen sliding scale   SubCutaneous at bedtime  insulin lispro Injectable (HumaLOG) 14 Unit(s) SubCutaneous three times a day before meals  metoprolol tartrate 100 milliGRAM(s) Oral two times a day  NIFEdipine XL 30 milliGRAM(s) Oral daily  OXcarbazepine 300 milliGRAM(s) Oral two times a day    REVIEW OF SYSTEMS  --------------------------------------------------------------------------------  Gen: No fever  Respiratory: See HPI  CV: No chest pain  GI: No abdominal pain  Neuro: No dizziness    VITALS/PHYSICAL EXAM  --------------------------------------------------------------------------------  T(C): 36.7 (03-31-18 @ 06:40), Max: 37.4 (03-30-18 @ 13:47)  HR: 69 (03-31-18 @ 06:40) (66 - 80)  BP: 116/98 (03-31-18 @ 06:40) (103/86 - 179/86)  RR: 18 (03-31-18 @ 06:40) (18 - 20)  SpO2: 99% (03-31-18 @ 05:33) (99% - 100%)  Wt(kg): --  Height (cm): 167.64 (03-29-18 @ 10:37)  Weight (kg): 90.7 (03-29-18 @ 10:37)  BMI (kg/m2): 32.3 (03-29-18 @ 10:37)  BSA (m2): 2 (03-29-18 @ 10:37)    03-30-18 @ 07:01  -  03-31-18 @ 07:00  --------------------------------------------------------  IN: 700 mL / OUT: 3300 mL / NET: -2600 mL    Physical Exam:  	Gen: NAD  	HEENT: No JVD  	Pulm: fair air entry B/L  	CV: S1S2+  	Abd: soft    	LE: left BKA  	Vascular access: LUE AVF: skin intact    LABS/STUDIES  --------------------------------------------------------------------------------              11.0   8.30  >-----------<  189      [03-31-18 @ 06:30]              33.0     132  |  86  |  66  ----------------------------<  196      [03-31-18 @ 06:30]  4.9   |  26  |  6.13        Ca     7.7     [03-31-18 @ 06:30]      iCa    0.90     [03-30 @ 06:00]      Mg     2.1     [03-31-18 @ 06:30]      Phos  5.8     [03-31-18 @ 06:30]    TPro  7.6  /  Alb  4.1  /  TBili  0.3  /  DBili  x   /  AST  11  /  ALT  27  /  AlkPhos  83  [03-29-18 @ 09:37]    .5 (Ca --)      [03-30-18 @ 06:00]   --  HbA1c 7.7      [03-27-18 @ 09:20]    HBsAb 31.9      [03-26-18 @ 16:50]  HBsAg NEGATIVE      [03-26-18 @ 16:50]  HBcAb Reactive      [03-26-18 @ 16:50]  HCV 0.20, Nonreactive Hepatitis C AB  S/CO Ratio                        Interpretation  < 1.0                                     Non-Reactive  1.0 - 4.9                           Weakly-Reactive  > 5.0                                 Reactive  Non-Reactive: Aperson with a non-reactive HCV antibody  result is considered uninfected.  No further action is  needed unless recent infection is suspected.  In these  cases, consider repeat testing later to detect  seroconversion..  Weakly-Reactive: HCV antibody test is abnormal, HCV RNA  Qualitative test will follow.  Reactive: HCV antibody test is abnormal, HCV RNA  Qualitative test will follow.  Note: HCV antibody testing is performed on the [employer]   system.      [03-26-18 @ 16:50]

## 2018-03-31 NOTE — PROGRESS NOTE ADULT - PROBLEM SELECTOR PLAN 1
- Now resolved, pt's mental status back to baseline  - Pt admits to consuming extra doses of Lyrica prior to admission because she ran out of Oxycodone  - Consider pain management consult to assist with weaning off opiates entirely  - Will continue to hold Lyrica   - Pt normally takes Oxycodone 30mg q4 hrs; she is heavily dependent for 3 years now 2/2 chronic back pain. ISTOP reference # [id number]  - Will resume Oxycodone 5mg q8 hours for now, pt in agreement   - No indication for CT head at this point; if acute change in mental status, will revisit CT head  - Psych following; strongly suspect a degree of depression - pt's daughter currently at [facility]'s ICU s/p open heart surgery due to complications of an infected valve.

## 2018-04-01 ENCOUNTER — TRANSCRIPTION ENCOUNTER (OUTPATIENT)
Age: 38
End: 2018-04-01

## 2018-04-01 LAB
BUN SERPL-MCNC: 82 MG/DL — HIGH (ref 7–23)
CALCIUM SERPL-MCNC: 7.9 MG/DL — LOW (ref 8.4–10.5)
CHLORIDE SERPL-SCNC: 88 MMOL/L — LOW (ref 98–107)
CO2 SERPL-SCNC: 25 MMOL/L — SIGNIFICANT CHANGE UP (ref 22–31)
CREAT SERPL-MCNC: 6.08 MG/DL — HIGH (ref 0.5–1.3)
GLUCOSE SERPL-MCNC: 244 MG/DL — HIGH (ref 70–99)
HCT VFR BLD CALC: 35.2 % — SIGNIFICANT CHANGE UP (ref 34.5–45)
HGB BLD-MCNC: 11.3 G/DL — LOW (ref 11.5–15.5)
MCHC RBC-ENTMCNC: 29 PG — SIGNIFICANT CHANGE UP (ref 27–34)
MCHC RBC-ENTMCNC: 32.1 % — SIGNIFICANT CHANGE UP (ref 32–36)
MCV RBC AUTO: 90.3 FL — SIGNIFICANT CHANGE UP (ref 80–100)
NRBC # FLD: 0 — SIGNIFICANT CHANGE UP
PLATELET # BLD AUTO: 207 K/UL — SIGNIFICANT CHANGE UP (ref 150–400)
PMV BLD: 10.5 FL — SIGNIFICANT CHANGE UP (ref 7–13)
POTASSIUM SERPL-MCNC: 5 MMOL/L — SIGNIFICANT CHANGE UP (ref 3.5–5.3)
POTASSIUM SERPL-SCNC: 5 MMOL/L — SIGNIFICANT CHANGE UP (ref 3.5–5.3)
RBC # BLD: 3.9 M/UL — SIGNIFICANT CHANGE UP (ref 3.8–5.2)
RBC # FLD: 13.7 % — SIGNIFICANT CHANGE UP (ref 10.3–14.5)
SODIUM SERPL-SCNC: 133 MMOL/L — LOW (ref 135–145)
WBC # BLD: 8.87 K/UL — SIGNIFICANT CHANGE UP (ref 3.8–10.5)
WBC # FLD AUTO: 8.87 K/UL — SIGNIFICANT CHANGE UP (ref 3.8–10.5)

## 2018-04-01 PROCEDURE — 99233 SBSQ HOSP IP/OBS HIGH 50: CPT

## 2018-04-01 RX ORDER — INSULIN LISPRO 100/ML
15 VIAL (ML) SUBCUTANEOUS
Refills: 0 | Status: DISCONTINUED | OUTPATIENT
Start: 2018-04-01 | End: 2018-04-04

## 2018-04-01 RX ORDER — INSULIN GLARGINE 100 [IU]/ML
18 INJECTION, SOLUTION SUBCUTANEOUS AT BEDTIME
Refills: 0 | Status: DISCONTINUED | OUTPATIENT
Start: 2018-04-01 | End: 2018-04-03

## 2018-04-01 RX ADMIN — Medication 2001 MILLIGRAM(S): at 17:07

## 2018-04-01 RX ADMIN — INSULIN GLARGINE 18 UNIT(S): 100 INJECTION, SOLUTION SUBCUTANEOUS at 23:28

## 2018-04-01 RX ADMIN — CALCITRIOL 0.25 MICROGRAM(S): 0.5 CAPSULE ORAL at 13:01

## 2018-04-01 RX ADMIN — Medication 30 MILLIGRAM(S): at 06:37

## 2018-04-01 RX ADMIN — ATORVASTATIN CALCIUM 20 MILLIGRAM(S): 80 TABLET, FILM COATED ORAL at 23:30

## 2018-04-01 RX ADMIN — OXYCODONE HYDROCHLORIDE 5 MILLIGRAM(S): 5 TABLET ORAL at 00:27

## 2018-04-01 RX ADMIN — Medication 15 UNIT(S): at 18:00

## 2018-04-01 RX ADMIN — HEPARIN SODIUM 5000 UNIT(S): 5000 INJECTION INTRAVENOUS; SUBCUTANEOUS at 06:37

## 2018-04-01 RX ADMIN — Medication 2001 MILLIGRAM(S): at 13:01

## 2018-04-01 RX ADMIN — HEPARIN SODIUM 5000 UNIT(S): 5000 INJECTION INTRAVENOUS; SUBCUTANEOUS at 23:30

## 2018-04-01 RX ADMIN — OXYCODONE HYDROCHLORIDE 5 MILLIGRAM(S): 5 TABLET ORAL at 09:50

## 2018-04-01 RX ADMIN — Medication 2: at 18:00

## 2018-04-01 RX ADMIN — Medication 15 UNIT(S): at 13:01

## 2018-04-01 RX ADMIN — Medication 100 MILLIGRAM(S): at 17:08

## 2018-04-01 RX ADMIN — Medication 14 UNIT(S): at 09:17

## 2018-04-01 RX ADMIN — OXCARBAZEPINE 300 MILLIGRAM(S): 300 TABLET, FILM COATED ORAL at 17:07

## 2018-04-01 RX ADMIN — OXCARBAZEPINE 300 MILLIGRAM(S): 300 TABLET, FILM COATED ORAL at 06:37

## 2018-04-01 RX ADMIN — HEPARIN SODIUM 5000 UNIT(S): 5000 INJECTION INTRAVENOUS; SUBCUTANEOUS at 13:01

## 2018-04-01 RX ADMIN — HALOPERIDOL DECANOATE 1 MILLIGRAM(S): 100 INJECTION INTRAMUSCULAR at 23:29

## 2018-04-01 RX ADMIN — Medication 100 MILLIGRAM(S): at 06:37

## 2018-04-01 RX ADMIN — OXYCODONE HYDROCHLORIDE 5 MILLIGRAM(S): 5 TABLET ORAL at 17:08

## 2018-04-01 RX ADMIN — OXYCODONE HYDROCHLORIDE 5 MILLIGRAM(S): 5 TABLET ORAL at 18:00

## 2018-04-01 RX ADMIN — Medication 6: at 09:16

## 2018-04-01 RX ADMIN — OXYCODONE HYDROCHLORIDE 5 MILLIGRAM(S): 5 TABLET ORAL at 00:57

## 2018-04-01 RX ADMIN — Medication 2001 MILLIGRAM(S): at 08:52

## 2018-04-01 RX ADMIN — OXYCODONE HYDROCHLORIDE 5 MILLIGRAM(S): 5 TABLET ORAL at 08:52

## 2018-04-01 NOTE — PROGRESS NOTE ADULT - PROBLEM SELECTOR PLAN 1
- Now resolved, pt's mental status back to baseline  - Pt admits to consuming extra doses of Lyrica prior to admission because she ran out of Oxycodone  - Consider pain management consult to assist with weaning off opiates entirely  - Will continue to hold Lyrica   - Pt normally takes Oxycodone 30mg q4 hrs; she is heavily dependent for 3 years now 2/2 chronic back pain. ISTOP reference # [id number]  - Will resume Oxycodone 5mg q8 hours for now, pt in agreement   - No indication for CT head at this point; if acute change in mental status, will revisit CT head  - Psych following; strongly suspect a degree of depression - pt's daughter currently at [facility]'s ICU s/p open heart surgery due to complications of an infected valve.

## 2018-04-01 NOTE — DISCHARGE NOTE ADULT - PLAN OF CARE
Resolution of symptoms Monitor finger sticks pre-meal and bedtime, low salt, fat and carbohydrate diet, minimize glucose intake.  Exercise daily for at least 30 minutes and weight loss.  Follow up with primary care physician and endocrinologist for routine Hemoglobin A1C checks and management.  Follow up with your ophthalmologist for routine yearly vision exams. Continue blood pressure, cholesterol and diabetic medications. Goal of hemoglobin A1C (HgbA1C) < 7%.  Avoid nephrotoxic drugs such as nonsteroidal anti-inflammatory agents (NSAIDs).   Please follow up with your nephrologist to monitor your kidney function, continue with low protein and potassium diet. Now resolved after dialysis. Enalapril discontinued. Renal diet.  Follow up with your primary care physician within 1-2 weeks. Continue dialysis as scheduled. Now resolved. Continue to hold Lyrica. Oxycodone restarted at 5mg every 8 hours.  Follow up with your primary care physician within 1-2 weeks. Low sodium and fat diet, continue anti-hypertensive medications, and follow up with primary care physician. Continue oral diamox and eyedrops (brimonidine, timolol, dorzolamide, latanoprost)  Opthalmalogy follow up for surgery for neovascular glaucoma Continue blood pressure, cholesterol and diabetic medications. Goal of hemoglobin A1C (HgbA1C) < 7%. Avoid nephrotoxic drugs such as nonsteroidal anti-inflammatory agents (NSAIDs).   Please follow up with Your nephrologist to monitor your kidney function, continue with low protein and potassium diet.  Continue HD tuesday, thursday, saturday Low sodium and fat diet, continue anti-hypertensive medications, and follow up with primary care physician.  metoprolol, nifedepine Monitor finger sticks pre-meal and bedtime, low salt, fat and carbohydrate diet, minimize glucose intake.  Exercise daily for at least 30 minutes and weight loss.  Follow up with primary care physician and endocrinologist for routine Hemoglobin A1C checks and management.  Follow up with your ophthalmologist for routine yearly vision exams.  lantus, humalog Now resolved. Restart  Lyrica. Oxycodone restarted at 5mg every 8 hours.  Follow up with your primary care physician within 1-2 weeks. neovascular glaucoma Now resolved after dialysis. Enalapril discontinued. Renal diet.   Follow up with your primary care physician within 1-2 weeks. Continue dialysis as scheduled. Low sodium and fat diet, continue anti-hypertensive medications, and follow up with primary care physician-1 -2 weeks post discharge.  metoprolol, nifedepine Continue oral diamox and eyedrops (brimonidine, timolol, dorzolamide, latanoprost)  Opthalmalogy follow up for surgery for neovascular glaucoma>> pt is being transferred to [place]>>  patient needs urgent vitrectomy and glaucoma tube surgery, - Dr. Zafar (retina) and Dr. Lombardo (glaucoma) - will do surgery at [facility] at 7 am on 4/4 Now resolved. Restart  Lyrica. Oxycodone restarted at 5mg every 6 hours as needed for pain,   Follow up with your primary care physician within 1-2 weeks. 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 Zafar (retina) and Dr. Lombardo (glaucoma) - will do surgery at [facility] at 7 am on 4/4 Now resolved. Oxycodone restarted at 5mg every 6 hours as needed for pain,   Follow up with your primary care physician within 1-2 weeks. Continue oral Diamox and eyedrops (brimonidine, timolol, dorzolamide, latanoprost)  Opthalmology follow up for surgery for neovascular glaucoma>>  >> IOP not responding to diamox so paracentesis brought down to 28,26 -   - patient needs outpatient vitrectomy and glaucoma tube surgery, Now resolved after dialysis. Enalapril discontinued. Renal diet.   Follow up with your primary care physician within 1-2 weeks. Continue dialysis as scheduled.  Kayexelate must be taken on NON HD days.

## 2018-04-01 NOTE — DISCHARGE NOTE ADULT - CARE PROVIDERS DIRECT ADDRESSES
,DirectAddress_Unknown ,DirectAddress_Unknown,DirectAddress_Unknown,[email or web address] ,[email or web address],[email or web address],DirectAddress_Unknown,DirectAddress_Unknown ,DirectAddress_Unknown,DirectAddress_Unknown,DirectAddress_Unknown,DirectAddress_Unknown

## 2018-04-01 NOTE — DISCHARGE NOTE ADULT - MEDICATION SUMMARY - MEDICATIONS TO CHANGE
I will SWITCH the dose or number of times a day I take the medications listed below when I get home from the hospital:    Lyrica 100 mg oral capsule  -- 2 cap(s) by mouth 2 times a day    NovoLOG 100 units/mL subcutaneous solution  -- 14 unit(s) subcutaneous 3 times a day (before meals), decrease dose to 10-12units subcutaneous if premeal FSBG < 200    Lantus 100 units/mL subcutaneous solution  -- 14 unit(s) subcutaneous once a day (at bedtime) I will SWITCH the dose or number of times a day I take the medications listed below when I get home from the hospital:    NovoLOG 100 units/mL subcutaneous solution  -- 14 unit(s) subcutaneous 3 times a day (before meals), decrease dose to 10-12units subcutaneous if premeal FSBG < 200    Lantus 100 units/mL subcutaneous solution  -- 14 unit(s) subcutaneous once a day (at bedtime)

## 2018-04-01 NOTE — DISCHARGE NOTE ADULT - CARE PLAN
Principal Discharge DX:	Toxic encephalopathy  Goal:	Resolution of symptoms  Assessment and plan of treatment:	Now resolved. Continue to hold Lyrica. Oxycodone restarted at 5mg every 8 hours.  Follow up with your primary care physician within 1-2 weeks.  Secondary Diagnosis:	Hyperkalemia  Assessment and plan of treatment:	Now resolved after dialysis. Enalapril discontinued. Renal diet.  Follow up with your primary care physician within 1-2 weeks. Continue dialysis as scheduled.  Secondary Diagnosis:	ESRD (end stage renal disease) on dialysis  Assessment and plan of treatment:	Continue blood pressure, cholesterol and diabetic medications. Goal of hemoglobin A1C (HgbA1C) < 7%.  Avoid nephrotoxic drugs such as nonsteroidal anti-inflammatory agents (NSAIDs).   Please follow up with your nephrologist to monitor your kidney function, continue with low protein and potassium diet.  Secondary Diagnosis:	Hypertension  Assessment and plan of treatment:	Low sodium and fat diet, continue anti-hypertensive medications, and follow up with primary care physician.  Secondary Diagnosis:	Diabetes  Assessment and plan of treatment:	Monitor finger sticks pre-meal and bedtime, low salt, fat and carbohydrate diet, minimize glucose intake.  Exercise daily for at least 30 minutes and weight loss.  Follow up with primary care physician and endocrinologist for routine Hemoglobin A1C checks and management.  Follow up with your ophthalmologist for routine yearly vision exams. Principal Discharge DX:	Toxic encephalopathy  Goal:	Resolution of symptoms  Assessment and plan of treatment:	Now resolved. Restart  Lyrica. Oxycodone restarted at 5mg every 8 hours.  Follow up with your primary care physician within 1-2 weeks.  Secondary Diagnosis:	Hyperkalemia  Assessment and plan of treatment:	Now resolved after dialysis. Enalapril discontinued. Renal diet.  Follow up with your primary care physician within 1-2 weeks. Continue dialysis as scheduled.  Secondary Diagnosis:	ESRD (end stage renal disease) on dialysis  Assessment and plan of treatment:	Continue blood pressure, cholesterol and diabetic medications. Goal of hemoglobin A1C (HgbA1C) < 7%. Avoid nephrotoxic drugs such as nonsteroidal anti-inflammatory agents (NSAIDs).   Please follow up with Your nephrologist to monitor your kidney function, continue with low protein and potassium diet.  Continue HD tuesday, thursday, saturday  Secondary Diagnosis:	Hypertension  Assessment and plan of treatment:	Low sodium and fat diet, continue anti-hypertensive medications, and follow up with primary care physician.  metoprolol, nifedepine  Secondary Diagnosis:	Diabetes  Assessment and plan of treatment:	Monitor finger sticks pre-meal and bedtime, low salt, fat and carbohydrate diet, minimize glucose intake.  Exercise daily for at least 30 minutes and weight loss.  Follow up with primary care physician and endocrinologist for routine Hemoglobin A1C checks and management.  Follow up with your ophthalmologist for routine yearly vision exams.  lantus, humalog  Secondary Diagnosis:	Glaucoma  Goal:	neovascular glaucoma  Assessment and plan of treatment:	Continue oral diamox and eyedrops (brimonidine, timolol, dorzolamide, latanoprost)  Opthalmalogy follow up for surgery for neovascular glaucoma Principal Discharge DX:	Toxic encephalopathy  Goal:	Resolution of symptoms  Assessment and plan of treatment:	Now resolved. Restart  Lyrica. Oxycodone restarted at 5mg every 6 hours as needed for pain,   Follow up with your primary care physician within 1-2 weeks.  Secondary Diagnosis:	Hyperkalemia  Assessment and plan of treatment:	Now resolved after dialysis. Enalapril discontinued. Renal diet.   Follow up with your primary care physician within 1-2 weeks. Continue dialysis as scheduled.  Secondary Diagnosis:	ESRD (end stage renal disease) on dialysis  Assessment and plan of treatment:	Continue blood pressure, cholesterol and diabetic medications. Goal of hemoglobin A1C (HgbA1C) < 7%. Avoid nephrotoxic drugs such as nonsteroidal anti-inflammatory agents (NSAIDs).   Please follow up with Your nephrologist to monitor your kidney function, continue with low protein and potassium diet.  Continue HD tuesday, thursday, saturday  Secondary Diagnosis:	Hypertension  Assessment and plan of treatment:	Low sodium and fat diet, continue anti-hypertensive medications, and follow up with primary care physician-1 -2 weeks post discharge.  metoprolol, nifedepine  Secondary Diagnosis:	Diabetes  Assessment and plan of treatment:	Monitor finger sticks pre-meal and bedtime, low salt, fat and carbohydrate diet, minimize glucose intake.  Exercise daily for at least 30 minutes and weight loss.  Follow up with primary care physician and endocrinologist for routine Hemoglobin A1C checks and management.  Follow up with your ophthalmologist for routine yearly vision exams.  lantus, humalog  Secondary Diagnosis:	Glaucoma  Goal:	neovascular glaucoma  Assessment and plan of treatment:	Continue oral diamox and eyedrops (brimonidine, timolol, dorzolamide, latanoprost)  Opthalmalogy follow up for surgery for neovascular glaucoma>> pt is being transferred to [place]>>  patient needs urgent vitrectomy and glaucoma tube surgery, - Dr. Zafar (retina) and Dr. Lombardo (glaucoma) - will do surgery at [facility] at 7 am on 4/4 Principal Discharge DX:	Toxic encephalopathy  Goal:	Resolution of symptoms  Assessment and plan of treatment:	Now resolved. Restart  Lyrica. Oxycodone restarted at 5mg every 6 hours as needed for pain,   Follow up with your primary care physician within 1-2 weeks.  Secondary Diagnosis:	Hyperkalemia  Assessment and plan of treatment:	Now resolved after dialysis. Enalapril discontinued. Renal diet.   Follow up with your primary care physician within 1-2 weeks. Continue dialysis as scheduled.  Secondary Diagnosis:	ESRD (end stage renal disease) on dialysis  Assessment and plan of treatment:	Continue blood pressure, cholesterol and diabetic medications. Goal of hemoglobin A1C (HgbA1C) < 7%. Avoid nephrotoxic drugs such as nonsteroidal anti-inflammatory agents (NSAIDs).   Please follow up with Your nephrologist to monitor your kidney function, continue with low protein and potassium diet.  Continue HD tuesday, thursday, saturday  Secondary Diagnosis:	Hypertension  Assessment and plan of treatment:	Low sodium and fat diet, continue anti-hypertensive medications, and follow up with primary care physician-1 -2 weeks post discharge.  metoprolol, nifedepine  Secondary Diagnosis:	Diabetes  Assessment and plan of treatment:	Monitor finger sticks pre-meal and bedtime, low salt, fat and carbohydrate diet, minimize glucose intake.  Exercise daily for at least 30 minutes and weight loss.  Follow up with primary care physician and endocrinologist for routine Hemoglobin A1C checks and management.  Follow up with your ophthalmologist for routine yearly vision exams.  lantus, humalog  Secondary Diagnosis:	Glaucoma  Goal:	neovascular glaucoma  Assessment and plan of treatment:	Continue oral diamox and eyedrops (brimonidine, timolol, dorzolamide, latanoprost)  Opthalmalogy follow up for surgery for neovascular glaucoma>>  >> IOP not responding to diamox so paracentesis brought down to 28,26 - c/w Oflox QID x 4 days   - patient needs urgent vitrectomy and glaucoma tube surgery,    pt is being transferred to [place]04 at 5 am>>  patient needs urgent vitrectomy and glaucoma tube surgery, - Dr. Zafar (retina) and Dr. Lombardo (glaucoma) - will do surgery at [facility] at 7 am on 4/4 Principal Discharge DX:	Toxic encephalopathy  Goal:	Resolution of symptoms  Assessment and plan of treatment:	Now resolved. Oxycodone restarted at 5mg every 6 hours as needed for pain,   Follow up with your primary care physician within 1-2 weeks.  Secondary Diagnosis:	Hyperkalemia  Assessment and plan of treatment:	Now resolved after dialysis. Enalapril discontinued. Renal diet.   Follow up with your primary care physician within 1-2 weeks. Continue dialysis as scheduled.  Kayexelate must be taken on NON HD days.  Secondary Diagnosis:	ESRD (end stage renal disease) on dialysis  Assessment and plan of treatment:	Continue blood pressure, cholesterol and diabetic medications. Goal of hemoglobin A1C (HgbA1C) < 7%. Avoid nephrotoxic drugs such as nonsteroidal anti-inflammatory agents (NSAIDs).   Please follow up with Your nephrologist to monitor your kidney function, continue with low protein and potassium diet.  Continue HD tuesday, thursday, saturday  Secondary Diagnosis:	Hypertension  Assessment and plan of treatment:	Low sodium and fat diet, continue anti-hypertensive medications, and follow up with primary care physician-1 -2 weeks post discharge.  metoprolol, nifedepine  Secondary Diagnosis:	Diabetes  Assessment and plan of treatment:	Monitor finger sticks pre-meal and bedtime, low salt, fat and carbohydrate diet, minimize glucose intake.  Exercise daily for at least 30 minutes and weight loss.  Follow up with primary care physician and endocrinologist for routine Hemoglobin A1C checks and management.  Follow up with your ophthalmologist for routine yearly vision exams.  lantus, humalog  Secondary Diagnosis:	Glaucoma  Goal:	neovascular glaucoma  Assessment and plan of treatment:	Continue oral Diamox and eyedrops (brimonidine, timolol, dorzolamide, latanoprost)  Opthalmology follow up for surgery for neovascular glaucoma>>  >> IOP not responding to diamox so paracentesis brought down to 28,26 -   - patient needs outpatient vitrectomy and glaucoma tube surgery,

## 2018-04-01 NOTE — PROGRESS NOTE ADULT - PROBLEM SELECTOR PLAN 3
- Appreciate renal input  - Resume phoslo, renal diet   - fluid overload is improving now - s/p over 7 L removed at HD since admission  - follow up with renal regarding further HD sessions inpt   - SW evaluation for transportation needs to and from HD

## 2018-04-01 NOTE — DISCHARGE NOTE ADULT - CARE PROVIDER_API CALL
Dr. Goldberg,   Your Nephrologist  Phone: (   )    -  Fax: (   )    - Dr. Goldberg,   Your Nephrologist  Phone: (   )    -  Fax: (   )    -    Austin Moreau), Internal Medicine; Nephrology  [address]  [address]  [place] [address]  Phone: [phone]  Fax: [phone]    Omar Zafar), Ophthalmology  [address]  [address]  [place] [address]  Phone: [phone]  Fax: [phone] Austin Moreau), Internal Medicine; Nephrology  [address]  [address]  [place] [address]  Phone: [phone]  Fax: [phone]    Omar Zafar), Ophthalmology  [address]  [address]  [place] [address]  Phone: [phone]  Fax: [phone]    Dr. Goldberg,   Your Nephrologist  Phone: (   )    -  Fax: (   )    -    follow up,   Neurologist for pain management  Phone: (   )    -  Fax: (   )    - Austin Moreau), Internal Medicine; Nephrology  [address]  [place] [address]  Phone: [phone]  Fax: [phone]    Dr. Goldberg,   Your Nephrologist  Phone: (   )    -  Fax: (   )    -    follow up,   Neurologist for pain management  Phone: (   )    -  Fax: (   )    -    Follow up APPOINTMENT,   Dr. Zafar [facility] after discharge, appointment made for 4/16/18 at 1:30  [address] [place] [address]  [phone]  Phone: (   )    -  Fax: (   )    -

## 2018-04-01 NOTE — DISCHARGE NOTE ADULT - MEDICATION SUMMARY - MEDICATIONS TO TAKE
I will START or STAY ON the medications listed below when I get home from the hospital:    Oxaydo 5 mg oral tablet  -- 1 tab(s) by mouth every 6 hours, As needed, Severe Pain (7 - 10)  -- Indication: For pain medications    Lyrica 100 mg oral capsule  -- 1 cap(s) by mouth 2 times a day  -- Indication: For Neuropathy    OXcarbazepine 300 mg oral tablet  -- 1 tab(s) by mouth 2 times a day  -- Indication: For back pain    Lantus 100 units/mL subcutaneous solution  -- 18 unit(s) subcutaneous once a day (at bedtime)  -- Indication: For DM    NovoLOG 100 units/mL subcutaneous solution  -- 15 unit(s) subcutaneous 3 times a day (before meals) -12units subcutaneous if premeal FSBG < 200  -- Indication: For DM    rosuvastatin 5 mg oral tablet  -- 1 tab(s) by mouth once a day (at bedtime)  -- Indication: For Hyperlipidemia    haloperidol 1 mg oral tablet  -- 1 tab(s) by mouth once a day (at bedtime)  -- Indication: For psychosis    Ambien 5 mg oral tablet  -- 1 tab(s) by mouth once a day (at bedtime)  -- Indication: For insomnia    metoprolol tartrate 100 mg oral tablet  -- 1 tab(s) by mouth 2 times a day  -- Indication: For HTN (hypertension)    Nifedical XL 30 mg oral tablet, extended release  -- 1 tab(s) by mouth once a day  -- Indication: For HTN (hypertension)    Trusopt 2% ophthalmic solution  -- 1 drop(s) to each affected eye 3 times a day- left eye  -- Indication: For glaucoma    Ocuflox 0.3% ophthalmic solution  -- 1 drop(s) to each affected eye every 3 hours stop after 3 doses- left eye  -- Indication: For glaucomA    Xalatan 0.005% ophthalmic solution  -- 1 drop(s) to each affected eye once a day (at bedtime)- left eye  -- Indication: For GLAUCOMA    Timoptic Ocudose 0.5% ophthalmic solution  -- 1 drop(s) to each affected eye 2 times a day- left eye  -- Indication: For GLAUCOMA    brimonidine 0.2% ophthalmic solution  -- 1 drop(s) to each affected eye 3 times a day- left eye  -- Indication: For glaucoma    calcium acetate 667 mg oral tablet  -- 3 tab(s) by mouth 3 times a day  -- Indication: For ESRD (end stage renal disease) on dialysis    Rocaltrol 0.25 mcg oral capsule  -- 1 cap(s) by mouth once a day  -- Indication: For ESRD (end stage renal disease) on dialysis I will START or STAY ON the medications listed below when I get home from the hospital:    Oxaydo 5 mg oral tablet  -- 1 tab(s) by mouth every 6 hours, As needed, Severe Pain (7 - 10)  -- Indication: For pain medications    Lyrica 100 mg oral capsule  -- 1 cap(s) by mouth 2 times a day  -- Indication: For Neuropathy    OXcarbazepine 300 mg oral tablet  -- 1 tab(s) by mouth 2 times a day  -- Indication: For back pain    Lantus 100 units/mL subcutaneous solution  -- 18 unit(s) subcutaneous once a day (at bedtime)  -- Indication: For DM    NovoLOG 100 units/mL subcutaneous solution  -- 15 unit(s) subcutaneous 3 times a day (before meals) -12units subcutaneous if premeal FSBG < 200  -- Indication: For DM    rosuvastatin 5 mg oral tablet  -- 1 tab(s) by mouth once a day (at bedtime)  -- Indication: For Hyperlipidemia    haloperidol 1 mg oral tablet  -- 1 tab(s) by mouth once a day (at bedtime)  -- Indication: For psychosis    Ambien 5 mg oral tablet  -- 1 tab(s) by mouth once a day (at bedtime)  -- Indication: For insomnia    metoprolol tartrate 100 mg oral tablet  -- 1 tab(s) by mouth 2 times a day  -- Indication: For HTN (hypertension)    Nifedical XL 30 mg oral tablet, extended release  -- 1 tab(s) by mouth once a day  -- Indication: For HTN (hypertension)    Trusopt 2% ophthalmic solution  -- 1 drop(s) to each affected eye 3 times a day- left eye  -- Indication: For glaucoma    Ocuflox 0.3% ophthalmic solution  -- 1 drop(s) to each affected eye every 3 hours stop after 3 doses- left eye  -- Indication: For glaucomA    Xalatan 0.005% ophthalmic solution  -- 1 drop(s) to each affected eye once a day (at bedtime)- left eye  -- Indication: For GLAUCOMA    Timoptic Ocudose 0.5% ophthalmic solution  -- 1 drop(s) to each affected eye 2 times a day- left eye  -- Indication: For GLAUCOMA    brimonidine 0.2% ophthalmic solution  -- 1 drop(s) to each affected eye 3 times a day- left eye  -- Indication: For glaucoma    ocular lubricant ophthalmic solution  -- 1 drop(s) to each affected eye 3 times a day  -- Indication: For ophthalmic lubricant     calcium acetate 667 mg oral tablet  -- 3 tab(s) by mouth 3 times a day  -- Indication: For ESRD (end stage renal disease) on dialysis    Rocaltrol 0.25 mcg oral capsule  -- 1 cap(s) by mouth once a day  -- Indication: For ESRD (end stage renal disease) on dialysis I will START or STAY ON the medications listed below when I get home from the hospital:    Oxaydo 5 mg oral tablet  -- 1 tab(s) by mouth every 6 hours, As needed, Severe Pain (7 - 10)  -- Indication: For pain medications    OXcarbazepine 300 mg oral tablet  -- 1 tab(s) by mouth 2 times a day  -- Indication: For back pain    Lantus 100 units/mL subcutaneous solution  -- 9 unit(s) subcutaneous once a day (at bedtime)  -- Indication: For Diabetes    NovoLOG 100 units/mL subcutaneous solution  -- 15 unit(s) subcutaneous 3 times a day (before meals) -12units subcutaneous if premeal FSBG < 200  -- Indication: For DM    rosuvastatin 5 mg oral tablet  -- 1 tab(s) by mouth once a day (at bedtime)  -- Indication: For Hyperlipidemia    haloperidol 1 mg oral tablet  -- 1 tab(s) by mouth once a day (at bedtime)  -- Indication: For psychosis    metoprolol tartrate 100 mg oral tablet  -- 1 tab(s) by mouth 2 times a day  -- Indication: For HTN (hypertension)    Nifedical XL 30 mg oral tablet, extended release  -- 1 tab(s) by mouth once a day  -- Indication: For HTN (hypertension)    sodium polystyrene sulfonate  -- 30 gram(s) by mouth Monday, Wednesday, Friday and Sunday, (On NON HD days)  -- Indication: For Hyperkalemia    acetaZOLAMIDE 250 mg oral tablet  -- 1 tab(s) by mouth every 12 hours  -- Indication: For Glaucoma    docusate sodium 100 mg oral capsule  -- 1 cap(s) by mouth 3 times a day  -- Indication: For Constipation    senna oral tablet  -- 2 tab(s) by mouth once a day (at bedtime)  -- Indication: For Constipation    ocular lubricant ophthalmic solution  -- 1 drop(s) to each affected eye 3 times a day  -- Indication: For ophthalmic lubricant     Trusopt 2% ophthalmic solution  -- 1 drop(s) to each affected eye 3 times a day- left eye  -- Indication: For glaucoma    Xalatan 0.005% ophthalmic solution  -- 1 drop(s) to each affected eye once a day (at bedtime)- left eye  -- Indication: For GLAUCOMA    Timoptic Ocudose 0.5% ophthalmic solution  -- 1 drop(s) to each affected eye 2 times a day- left eye  -- Indication: For GLAUCOMA    brimonidine 0.2% ophthalmic solution  -- 1 drop(s) to each affected eye 3 times a day- left eye  -- Indication: For glaucoma    calcium acetate 667 mg oral tablet  -- 3 tab(s) by mouth 3 times a day  -- Indication: For ESRD (end stage renal disease) on dialysis    sevelamer hydrochloride 800 mg oral tablet  -- 2 tab(s) by mouth 3 times a day  -- Indication: For Calcium I will START or STAY ON the medications listed below when I get home from the hospital:    Oxaydo 5 mg oral tablet  -- 1 tab(s) by mouth every 6 hours, As needed, Severe Pain (7 - 10)  -- Indication: For pain medications    OXcarbazepine 300 mg oral tablet  -- 1 tab(s) by mouth 2 times a day  -- Indication: For back pain    Lantus 100 units/mL subcutaneous solution  -- 9 unit(s) subcutaneous once a day (at bedtime)  -- Indication: For Diabetes    NovoLOG 100 units/mL subcutaneous solution  -- 15 unit(s) subcutaneous 3 times a day (before meals) -12units subcutaneous if premeal FSBG < 200  -- Indication: For DM    rosuvastatin 5 mg oral tablet  -- 1 tab(s) by mouth once a day (at bedtime)  -- Indication: For Hyperlipidemia    metoprolol tartrate 100 mg oral tablet  -- 1 tab(s) by mouth 2 times a day  -- Indication: For HTN (hypertension)    Nifedical XL 30 mg oral tablet, extended release  -- 1 tab(s) by mouth once a day  -- Indication: For HTN (hypertension)    sodium polystyrene sulfonate  -- 30 gram(s) by mouth Monday, Wednesday, Friday and Sunday, (On NON HD days)  -- Indication: For Hyperkalemia    acetaZOLAMIDE 250 mg oral tablet  -- 1 tab(s) by mouth every 12 hours  -- Indication: For Glaucoma    docusate sodium 100 mg oral capsule  -- 1 cap(s) by mouth 3 times a day  -- Indication: For Constipation    senna oral tablet  -- 2 tab(s) by mouth once a day (at bedtime)  -- Indication: For Constipation    ocular lubricant ophthalmic solution  -- 1 drop(s) to each affected eye 3 times a day  -- Indication: For ophthalmic lubricant     Trusopt 2% ophthalmic solution  -- 1 drop(s) to each affected eye 3 times a day- left eye  -- Indication: For glaucoma    Xalatan 0.005% ophthalmic solution  -- 1 drop(s) to each affected eye once a day (at bedtime)- left eye  -- Indication: For GLAUCOMA    Timoptic Ocudose 0.5% ophthalmic solution  -- 1 drop(s) to each affected eye 2 times a day- left eye  -- Indication: For GLAUCOMA    brimonidine 0.2% ophthalmic solution  -- 1 drop(s) to each affected eye 3 times a day- left eye  -- Indication: For glaucoma    calcium acetate 667 mg oral tablet  -- 3 tab(s) by mouth 3 times a day  -- Indication: For ESRD (end stage renal disease) on dialysis    sevelamer hydrochloride 800 mg oral tablet  -- 2 tab(s) by mouth 3 times a day  -- Indication: For Calcium I will START or STAY ON the medications listed below when I get home from the hospital:    Oxaydo 5 mg oral tablet  -- 1 tab(s) by mouth every 6 hours, As needed, Severe Pain (7 - 10)  -- Indication: For pain medications    OXcarbazepine 300 mg oral tablet  -- 1 tab(s) by mouth 2 times a day  -- Indication: For back pain    pregabalin 100 mg oral capsule  -- 1 cap(s) by mouth every 12 hours  -- Indication: For pain    Lantus 100 units/mL subcutaneous solution  -- 9 unit(s) subcutaneous once a day (at bedtime)  -- Indication: For Diabetes    NovoLOG 100 units/mL subcutaneous solution  -- 15 unit(s) subcutaneous 3 times a day (before meals) -12units subcutaneous if premeal FSBG < 200  -- Indication: For DM    rosuvastatin 5 mg oral tablet  -- 1 tab(s) by mouth once a day (at bedtime)  -- Indication: For Hyperlipidemia    metoprolol tartrate 100 mg oral tablet  -- 1 tab(s) by mouth 2 times a day  -- Indication: For HTN (hypertension)    Nifedical XL 30 mg oral tablet, extended release  -- 1 tab(s) by mouth once a day  -- Indication: For HTN (hypertension)    sodium polystyrene sulfonate  -- 30 gram(s) by mouth Monday, Wednesday, Friday and Sunday, (On NON HD days)  -- Indication: For Hyperkalemia    acetaZOLAMIDE 250 mg oral tablet  -- 1 tab(s) by mouth every 12 hours  -- Indication: For Glaucoma    docusate sodium 100 mg oral capsule  -- 1 cap(s) by mouth 3 times a day  -- Indication: For Constipation    senna oral tablet  -- 2 tab(s) by mouth once a day (at bedtime)  -- Indication: For Constipation    ocular lubricant ophthalmic solution  -- 1 drop(s) to each affected eye 3 times a day  -- Indication: For ophthalmic lubricant     Trusopt 2% ophthalmic solution  -- 1 drop(s) to each affected eye 3 times a day- left eye  -- Indication: For glaucoma    Xalatan 0.005% ophthalmic solution  -- 1 drop(s) to each affected eye once a day (at bedtime)- left eye  -- Indication: For GLAUCOMA    Timoptic Ocudose 0.5% ophthalmic solution  -- 1 drop(s) to each affected eye 2 times a day- left eye  -- Indication: For GLAUCOMA    brimonidine 0.2% ophthalmic solution  -- 1 drop(s) to each affected eye 3 times a day- left eye  -- Indication: For glaucoma    calcium acetate 667 mg oral tablet  -- 3 tab(s) by mouth 3 times a day  -- Indication: For ESRD (end stage renal disease) on dialysis    sevelamer hydrochloride 800 mg oral tablet  -- 2 tab(s) by mouth 3 times a day  -- Indication: For Calcium

## 2018-04-01 NOTE — DISCHARGE NOTE ADULT - PROVIDER TOKENS
FREE:[LAST:[Dr. Goldberg],PHONE:[(   )    -],FAX:[(   )    -],ADDRESS:[Your Nephrologist]] FREE:[LAST:[Dr. Goldberg],PHONE:[(   )    -],FAX:[(   )    -],ADDRESS:[Your Nephrologist]],TOKEN:'[id number]',TOKEN:'[id number] TOKEN:'[id number]:'[id number]',FREE:[LAST:[Dr. Goldberg],PHONE:[(   )    -],FAX:[(   )    -],ADDRESS:[Your Nephrologist]],FREE:[LAST:[follow up],PHONE:[(   )    -],FAX:[(   )    -],ADDRESS:[Neurologist for pain management]] LUCILA:'[id number]',FREE:[LAST:[Dr. Goldberg],PHONE:[(   )    -],FAX:[(   )    -],ADDRESS:[Your Nephrologist]],FREE:[LAST:[follow up],PHONE:[(   )    -],FAX:[(   )    -],ADDRESS:[Neurologist for pain management]],FREE:[LAST:[Follow up APPOINTMENT],PHONE:[(   )    -],FAX:[(   )    -],ADDRESS:[Dr. [facility] after discharge, appointment made for 4/16/18 at 1:30  [address] [place] [address]  [phone]]]

## 2018-04-01 NOTE — DISCHARGE NOTE ADULT - PATIENT PORTAL LINK FT
You can access the [facility] Patient Portal, offered by [facility], by registering with the following website: [email or web address]

## 2018-04-01 NOTE — DISCHARGE NOTE ADULT - MEDICATION SUMMARY - MEDICATIONS TO STOP TAKING
I will STOP taking the medications listed below when I get home from the hospital:    amLODIPine 10 mg oral tablet  -- 1 tab(s) by mouth once a day    enalapril 10 mg oral tablet  -- 1 tab(s) by mouth every 8 hours    oxyCODONE 30 mg oral tablet  -- 1 tab(s) by mouth every 6 hours, As Needed I will STOP taking the medications listed below when I get home from the hospital:    amLODIPine 10 mg oral tablet  -- 1 tab(s) by mouth once a day    Ambien 5 mg oral tablet  -- 1 tab(s) by mouth once a day (at bedtime)    Lyrica 100 mg oral capsule  -- 2 cap(s) by mouth 2 times a day    enalapril 10 mg oral tablet  -- 1 tab(s) by mouth every 8 hours    oxyCODONE 30 mg oral tablet  -- 1 tab(s) by mouth every 6 hours, As Needed I will STOP taking the medications listed below when I get home from the hospital:    amLODIPine 10 mg oral tablet  -- 1 tab(s) by mouth once a day    Ambien 5 mg oral tablet  -- 1 tab(s) by mouth once a day (at bedtime)    enalapril 10 mg oral tablet  -- 1 tab(s) by mouth every 8 hours    oxyCODONE 30 mg oral tablet  -- 1 tab(s) by mouth every 6 hours, As Needed

## 2018-04-01 NOTE — DISCHARGE NOTE ADULT - NS AS ACTIVITY OBS
Showering allowed/Walking-Indoors allowed/Walking-Outdoors allowed/Stairs allowed Do not drive or operate machinery/Showering allowed/Walking-Indoors allowed/Walking-Outdoors allowed/Stairs allowed

## 2018-04-01 NOTE — DISCHARGE NOTE ADULT - COMMUNITY RESOURCES
[facility] address: [address] [place] [address] tel # [phone] for rehab possible LTC. HD @ [facility] Sat tel # [phone]. Next treatment Thursday 4/12.

## 2018-04-01 NOTE — DISCHARGE NOTE ADULT - ADDITIONAL INSTRUCTIONS
Follow up with your primary care provider and nephrologist within 1-2 weeks. Call for appointment. Follow up with your primary care provider and nephrologist within 1-2 weeks. Call for appointment.   Patient should follow up Dr. [facility] after discharge   [address] [place] [address]  [phone]    follow up with neurologist about managing neuropathic pain. Follow up with your primary care provider and nephrologist within 1-2 weeks. Call for appointment.   Patient should follow up Dr. Zaf[facility] after discharge, appointment made for 4/16/18 at 1:30  [address] [place] [address]  [phone]    follow up with neurologist about managing neuropathic pain.

## 2018-04-01 NOTE — PROGRESS NOTE ADULT - SUBJECTIVE AND OBJECTIVE BOX
Patient is a 37y old  Female who presents with a chief complaint of Hyperkalemia (01 Apr 2018 09:18)      SUBJECTIVE / OVERNIGHT EVENTS:  still complaining of orthopnea.      MEDICATIONS  (STANDING):  atorvastatin 20 milliGRAM(s) Oral at bedtime  calcitriol   Capsule 0.25 MICROGram(s) Oral daily  calcium acetate 2001 milliGRAM(s) Oral three times a day with meals  dextrose 5%. 1000 milliLiter(s) (50 mL/Hr) IV Continuous <Continuous>  dextrose 50% Injectable 12.5 Gram(s) IV Push once  dextrose 50% Injectable 25 Gram(s) IV Push once  dextrose 50% Injectable 25 Gram(s) IV Push once  haloperidol     Tablet 1 milliGRAM(s) Oral at bedtime  heparin  Injectable 5000 Unit(s) SubCutaneous every 8 hours  insulin glargine Injectable (LANTUS) 18 Unit(s) SubCutaneous at bedtime  insulin lispro (HumaLOG) corrective regimen sliding scale   SubCutaneous three times a day before meals  insulin lispro (HumaLOG) corrective regimen sliding scale   SubCutaneous at bedtime  insulin lispro Injectable (HumaLOG) 15 Unit(s) SubCutaneous three times a day before meals  metoprolol tartrate 100 milliGRAM(s) Oral two times a day  NIFEdipine XL 30 milliGRAM(s) Oral daily  OXcarbazepine 300 milliGRAM(s) Oral two times a day    MEDICATIONS  (PRN):  acetaminophen   Tablet. 650 milliGRAM(s) Oral every 6 hours PRN mild, moderate pain  dextrose Gel 1 Dose(s) Oral once PRN Blood Glucose LESS THAN 70 milliGRAM(s)/deciliter  glucagon  Injectable 1 milliGRAM(s) IntraMuscular once PRN Glucose LESS THAN 70 milligrams/deciliter  haloperidol     Tablet 2 milliGRAM(s) Oral every 6 hours PRN Agitation  haloperidol    Injectable 2 milliGRAM(s) IV Push every 6 hours PRN Agitation  haloperidol    Injectable 2 milliGRAM(s) IntraMuscular every 6 hours PRN Agitation  oxyCODONE    IR 5 milliGRAM(s) Oral every 8 hours PRN For moderate to severe pain      Vital Signs Last 24 Hrs  T(C): 36.9 (01 Apr 2018 05:33), Max: 36.9 (31 Mar 2018 15:36)  T(F): 98.4 (01 Apr 2018 05:33), Max: 98.5 (31 Mar 2018 15:36)  HR: 71 (01 Apr 2018 05:33) (68 - 79)  BP: 146/74 (01 Apr 2018 05:33) (142/68 - 154/81)  BP(mean): --  RR: 20 (01 Apr 2018 05:33) (17 - 20)  SpO2: 99% (01 Apr 2018 05:33) (98% - 99%)  CAPILLARY BLOOD GLUCOSE      POCT Blood Glucose.: 258 mg/dL (01 Apr 2018 09:02)  POCT Blood Glucose.: 237 mg/dL (31 Mar 2018 21:50)  POCT Blood Glucose.: 193 mg/dL (31 Mar 2018 17:57)  POCT Blood Glucose.: 215 mg/dL (31 Mar 2018 12:51)    I&O's Summary    31 Mar 2018 07:01  -  01 Apr 2018 07:00  --------------------------------------------------------  IN: 500 mL / OUT: 3400 mL / NET: -2900 mL        PHYSICAL EXAM:  GENERAL: NAD, well-developed  HEAD:  Atraumatic, Normocephalic  EYES: EOMI, conjunctiva and sclera clear  NECK: Supple, No JVD  CHEST/LUNG: Bibasilar crackles; No wheeze  HEART: Regular rate and rhythm; No murmurs  ABDOMEN: Soft, Nontender, Nondistended; Bowel sounds present  EXTREMITIES:  2+ Peripheral Pulses, No edema  PSYCH: AAOx3  NEUROLOGY: non-focal  SKIN: No rashes or lesions    LABS:                        11.3   8.87  )-----------( 207      ( 01 Apr 2018 05:32 )             35.2     04-01    133<L>  |  88<L>  |  82<H>  ----------------------------<  244<H>  5.0   |  25  |  6.08<H>    Ca    7.9<L>      01 Apr 2018 05:32  Phos  5.8     03-31  Mg     2.1     03-31

## 2018-04-02 LAB
BUN SERPL-MCNC: 114 MG/DL — HIGH (ref 7–23)
CALCIUM SERPL-MCNC: 7.6 MG/DL — LOW (ref 8.4–10.5)
CHLORIDE SERPL-SCNC: 87 MMOL/L — LOW (ref 98–107)
CO2 SERPL-SCNC: 23 MMOL/L — SIGNIFICANT CHANGE UP (ref 22–31)
CREAT SERPL-MCNC: 8.23 MG/DL — HIGH (ref 0.5–1.3)
GLUCOSE SERPL-MCNC: 140 MG/DL — HIGH (ref 70–99)
HCT VFR BLD CALC: 34.1 % — LOW (ref 34.5–45)
HGB BLD-MCNC: 11.2 G/DL — LOW (ref 11.5–15.5)
MAGNESIUM SERPL-MCNC: 2.4 MG/DL — SIGNIFICANT CHANGE UP (ref 1.6–2.6)
MCHC RBC-ENTMCNC: 29.7 PG — SIGNIFICANT CHANGE UP (ref 27–34)
MCHC RBC-ENTMCNC: 32.8 % — SIGNIFICANT CHANGE UP (ref 32–36)
MCV RBC AUTO: 90.5 FL — SIGNIFICANT CHANGE UP (ref 80–100)
NRBC # FLD: 0 — SIGNIFICANT CHANGE UP
PHOSPHATE SERPL-MCNC: 7.6 MG/DL — HIGH (ref 2.5–4.5)
PLATELET # BLD AUTO: 205 K/UL — SIGNIFICANT CHANGE UP (ref 150–400)
PMV BLD: 10.5 FL — SIGNIFICANT CHANGE UP (ref 7–13)
POTASSIUM SERPL-MCNC: 5.5 MMOL/L — HIGH (ref 3.5–5.3)
POTASSIUM SERPL-SCNC: 5.5 MMOL/L — HIGH (ref 3.5–5.3)
RBC # BLD: 3.77 M/UL — LOW (ref 3.8–5.2)
RBC # FLD: 13.5 % — SIGNIFICANT CHANGE UP (ref 10.3–14.5)
SODIUM SERPL-SCNC: 133 MMOL/L — LOW (ref 135–145)
WBC # BLD: 9.49 K/UL — SIGNIFICANT CHANGE UP (ref 3.8–10.5)
WBC # FLD AUTO: 9.49 K/UL — SIGNIFICANT CHANGE UP (ref 3.8–10.5)

## 2018-04-02 PROCEDURE — 99233 SBSQ HOSP IP/OBS HIGH 50: CPT

## 2018-04-02 RX ORDER — SODIUM POLYSTYRENE SULFONATE 4.1 MEQ/G
15 POWDER, FOR SUSPENSION ORAL ONCE
Refills: 0 | Status: COMPLETED | OUTPATIENT
Start: 2018-04-02 | End: 2018-04-02

## 2018-04-02 RX ORDER — OXYCODONE HYDROCHLORIDE 5 MG/1
5 TABLET ORAL EVERY 6 HOURS
Refills: 0 | Status: DISCONTINUED | OUTPATIENT
Start: 2018-04-02 | End: 2018-04-08

## 2018-04-02 RX ADMIN — Medication 15 UNIT(S): at 18:10

## 2018-04-02 RX ADMIN — HEPARIN SODIUM 5000 UNIT(S): 5000 INJECTION INTRAVENOUS; SUBCUTANEOUS at 06:21

## 2018-04-02 RX ADMIN — OXCARBAZEPINE 300 MILLIGRAM(S): 300 TABLET, FILM COATED ORAL at 17:42

## 2018-04-02 RX ADMIN — Medication 2001 MILLIGRAM(S): at 17:42

## 2018-04-02 RX ADMIN — HEPARIN SODIUM 5000 UNIT(S): 5000 INJECTION INTRAVENOUS; SUBCUTANEOUS at 13:02

## 2018-04-02 RX ADMIN — INSULIN GLARGINE 18 UNIT(S): 100 INJECTION, SOLUTION SUBCUTANEOUS at 22:57

## 2018-04-02 RX ADMIN — Medication 100 MILLIGRAM(S): at 17:43

## 2018-04-02 RX ADMIN — SODIUM POLYSTYRENE SULFONATE 15 GRAM(S): 4.1 POWDER, FOR SUSPENSION ORAL at 14:19

## 2018-04-02 RX ADMIN — OXYCODONE HYDROCHLORIDE 5 MILLIGRAM(S): 5 TABLET ORAL at 05:05

## 2018-04-02 RX ADMIN — Medication 2001 MILLIGRAM(S): at 12:36

## 2018-04-02 RX ADMIN — HALOPERIDOL DECANOATE 1 MILLIGRAM(S): 100 INJECTION INTRAMUSCULAR at 22:14

## 2018-04-02 RX ADMIN — Medication 15 UNIT(S): at 09:21

## 2018-04-02 RX ADMIN — Medication 2001 MILLIGRAM(S): at 09:22

## 2018-04-02 RX ADMIN — HEPARIN SODIUM 5000 UNIT(S): 5000 INJECTION INTRAVENOUS; SUBCUTANEOUS at 22:14

## 2018-04-02 RX ADMIN — OXCARBAZEPINE 300 MILLIGRAM(S): 300 TABLET, FILM COATED ORAL at 06:21

## 2018-04-02 RX ADMIN — OXYCODONE HYDROCHLORIDE 5 MILLIGRAM(S): 5 TABLET ORAL at 04:11

## 2018-04-02 RX ADMIN — CALCITRIOL 0.25 MICROGRAM(S): 0.5 CAPSULE ORAL at 12:37

## 2018-04-02 RX ADMIN — ATORVASTATIN CALCIUM 20 MILLIGRAM(S): 80 TABLET, FILM COATED ORAL at 22:14

## 2018-04-02 RX ADMIN — OXYCODONE HYDROCHLORIDE 5 MILLIGRAM(S): 5 TABLET ORAL at 12:34

## 2018-04-02 RX ADMIN — Medication 15 UNIT(S): at 13:02

## 2018-04-02 RX ADMIN — Medication 30 MILLIGRAM(S): at 06:21

## 2018-04-02 RX ADMIN — Medication 100 MILLIGRAM(S): at 17:42

## 2018-04-02 RX ADMIN — OXYCODONE HYDROCHLORIDE 5 MILLIGRAM(S): 5 TABLET ORAL at 13:15

## 2018-04-02 RX ADMIN — OXYCODONE HYDROCHLORIDE 5 MILLIGRAM(S): 5 TABLET ORAL at 22:14

## 2018-04-02 RX ADMIN — OXYCODONE HYDROCHLORIDE 5 MILLIGRAM(S): 5 TABLET ORAL at 22:44

## 2018-04-02 RX ADMIN — Medication 100 MILLIGRAM(S): at 06:21

## 2018-04-02 NOTE — PROGRESS NOTE ADULT - PROBLEM SELECTOR PLAN 1
- Now resolved, pt's mental status back to baseline  - Pt admits to consuming extra doses of Lyrica prior to admission because she ran out of Oxycodone  -  pain management consult to assist with weaning off opiates entirely  - Will continue to hold Lyrica   - Pt normally takes Oxycodone 30mg q4 hrs; she is heavily dependent for 3 years now 2/2 chronic back pain. ISTOP reference # [id number]  - Cont Oxycodone 5mg q8 hours prn for now  - No indication for CT head at this point; if acute change in mental status, will revisit CT head  - Psych following; strongly suspect a degree of depression - pt's daughter currently at [facility]'s ICU s/p open heart surgery due to complications of an infected valve.

## 2018-04-02 NOTE — CONSULT NOTE ADULT - SUBJECTIVE AND OBJECTIVE BOX
Chief Complaint:    HPI:  36 y/o Female, with a PmHx of HTN, DM, left BKA, ESRD (on hemodialysis T//S), p/w diaphoresis and vomiting this morning.  Pt was at [facility] with her son (who is getting heart surgery), when a rapid response was called for her. Pt is a poor historian and does not remember why she is in the ED and was difficult to get information from her. Of note, pt is non-compliant and has a history of hyperkalemia and missed dialysis sessions, however, pt denies missing dialysis. Pt admits to fatigue, nausea, and abdominal pain. Pt denies CP, SOB, diarrhea, constipation. In the ED, she was found to be hyperkalemic with a k of 6.8. She was admitted to telemetry for HD. 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 650 milliGRAM(s) Oral every 6 hours PRN  haloperidol     Tablet 1 milliGRAM(s) Oral at bedtime  haloperidol     Tablet 2 milliGRAM(s) Oral every 6 hours PRN  haloperidol    Injectable 2 milliGRAM(s) IV Push every 6 hours PRN  haloperidol    Injectable 2 milliGRAM(s) IntraMuscular every 6 hours PRN  OXcarbazepine 300 milliGRAM(s) Oral two times a day  oxyCODONE    IR 5 milliGRAM(s) Oral every 8 hours PRN      Heme:  heparin  Injectable 5000 Unit(s) SubCutaneous every 8 hours    Antibiotics:    Cardiovascular:  metoprolol tartrate 100 milliGRAM(s) Oral two times a day  NIFEdipine XL 30 milliGRAM(s) Oral daily    GI:    Endocrine:  atorvastatin 20 milliGRAM(s) Oral at bedtime  dextrose 50% Injectable 12.5 Gram(s) IV Push once  dextrose 50% Injectable 25 Gram(s) IV Push once  dextrose 50% Injectable 25 Gram(s) IV Push once  dextrose Gel 1 Dose(s) Oral once PRN  glucagon  Injectable 1 milliGRAM(s) IntraMuscular once PRN  insulin glargine Injectable (LANTUS) 18 Unit(s) SubCutaneous at bedtime  insulin lispro (HumaLOG) corrective regimen sliding scale   SubCutaneous three times a day before meals  insulin lispro (HumaLOG) corrective regimen sliding scale   SubCutaneous at bedtime  insulin lispro Injectable (HumaLOG) 15 Unit(s) SubCutaneous three times a day before meals    All Other Medications:  calcitriol   Capsule 0.25 MICROGram(s) Oral daily  calcium acetate 2001 milliGRAM(s) Oral three times a day with meals  dextrose 5%. 1000 milliLiter(s) IV Continuous <Continuous>  sodium polystyrene sulfonate Suspension 15 Gram(s) Oral once      REVIEW OF SYSTEMS:    CONSTITUTIONAL: No fever, weight loss, or fatigue  EYES: No eye pain, visual disturbances, or discharge  ENMT:  No difficulty hearing, tinnitus, vertigo; No sinus or throat pain  NECK: No pain or stiffness  BREASTS: No pain, masses, or nipple discharge  RESPIRATORY: No cough, wheezing, chills or hemoptysis; No shortness of breath  CARDIOVASCULAR: No chest pain, palpitations, dizziness, or leg swelling  GASTROINTESTINAL: No abdominal or epigastric pain. No nausea, vomiting, or hematemesis; No diarrhea or constipation. No melena or hematochezia.  GENITOURINARY: No dysuria, frequency, hematuria, or incontinence  NEUROLOGICAL: No headaches, memory loss, loss of strength, numbness, or tremors  SKIN: No itching, burning, rashes, or lesions   LYMPH NODES: No enlarged glands  ENDOCRINE: No heat or cold intolerance; No hair loss  MUSCULOSKELETAL: No joint pain or swelling; No muscle, back, or extremity pain  PSYCHIATRIC: No depression, anxiety, mood swings, or difficulty sleeping  HEME/LYMPH: No easy bruising, or bleeding gums  ALLERY AND IMMUNOLOGIC: No hives or eczema      Vital Signs Last 24 Hrs  T(C): 36.5 (2018 05:16), Max: 36.5 (2018 05:16)  T(F): 97.7 (2018 05:16), Max: 97.7 (2018 05:16)  HR: 67 (2018 05:16) (67 - 112)  BP: 133/79 (2018 05:16) (133/79 - 160/73)  BP(mean): --  RR: 18 (2018 05:16) (18 - 20)  SpO2: 99% (2018 05:16) (98% - 99%)    PAIN SCORE:         SCALE USED: (1-10 VNRS)             PHYSICAL EXAM:    GENERAL: NAD, well-groomed, well-developed  HEAD:  Atraumatic, Normocephalic  EYES: EOMI, PERRLA, conjunctiva and sclera clear  ENMT: No tonsillar erythema, exudates, or enlargement; Moist mucous membranes, Good dentition, No lesions  NECK: Supple, No JVD, Normal thyroid  NERVOUS SYSTEM:  Alert & Oriented X3, Good concentration; Motor Strength 5/5 B/L upper and lower extremities; DTRs 2+ intact and symmetric  CHEST/LUNG: Clear to percussion bilaterally; No rales, rhonchi, wheezing, or rubs  HEART: Regular rate and rhythm; No murmurs, rubs, or gallops  ABDOMEN: Soft, Nontender, Nondistended; Bowel sounds present  EXTREMITIES:  2+ Peripheral Pulses, No clubbing, cyanosis, or edema  LYMPH: No lymphadenopathy noted  SKIN: No rashes or lesions        LABS:                          11.2   9.49  )-----------( 205      ( 2018 06:27 )             34.1     04-02    133<L>  |  87<L>  |  114<H>  ----------------------------<  140<H>  5.5<H>   |  23  |  8.23<H>    Ca    7.6<L>      2018 06:27  Phos  7.6     04-02  Mg     2.4     04-02        Subjective: Right now I'm doing fine, the pain comes and goes. The pain is a sharp shooting pain all down my leg and stump, when the pain comes it's unbearable and nothing seems to make it all go away. The oxycodone doesn't help as much, but the Lyrica helps me more then again and they took it away when I got into the hospital. I've been taking the Lyrica for years with no issues, I use to take as much as 300mg a day but now I only take 100mg in the morning and at bedtime. The oxycodone 30mg I take at home was prescribed by my primary but the reason why it was so high is because my pharmacy doesn't carry the lower doses. I don't mind taking lower dose oxycodone.     objective: Pt. A&Ox3, NAD. laying in bed watching tv on ipad. Able to move around in bed without pain or limitation. Answers all questions appropriately and maintains eye contact.             RECOMMENDATIONS  1. Resume home dosage of Lyrica 100mg BID  2. Make oxycodone 5mg q6hr PRN  3. Offer Lidoderm patchy to lower extremities  4. Get in contact with primary or have patient get in contact with primary to change dose of oxycodone 3omg to 5mg PRN as recommended by pain service.  5. Have patient follow up with neurologist about managing neuropathic pain.         [X ]  ZAYRA DELONG Reviewed and Copied to Chart Istop# [id number]

## 2018-04-02 NOTE — PROGRESS NOTE ADULT - PROBLEM SELECTOR PLAN 3
- Appreciate renal input  - Cont current meds,, renal diet   - fluid overload is improving now - s/p over 7 L removed at HD since admission  - follow up with renal regarding further HD sessions inpt   - SW evaluation for transportation needs to and from HD

## 2018-04-02 NOTE — PHYSICAL THERAPY INITIAL EVALUATION ADULT - GENERAL OBSERVATIONS, REHAB EVAL
Received supine and left sitting in chair with call bell in reach on the left. POP Segovia made aware.

## 2018-04-02 NOTE — PROGRESS NOTE ADULT - SUBJECTIVE AND OBJECTIVE BOX
Patient is a 37y old  Female who presents with a chief complaint of Hyperkalemia (01 Apr 2018 09:18)      SUBJECTIVE / OVERNIGHT EVENTS: Pt was seen and examined at 1150am, no overnight events, reports chronic back and rt leg pain, no other new issues, asking when she will be d/c. Has chronic blurry vision f/u with ophthalmology every month.    MEDICATIONS  (STANDING):  atorvastatin 20 milliGRAM(s) Oral at bedtime  calcitriol   Capsule 0.25 MICROGram(s) Oral daily  calcium acetate 2001 milliGRAM(s) Oral three times a day with meals  dextrose 5%. 1000 milliLiter(s) (50 mL/Hr) IV Continuous <Continuous>  dextrose 50% Injectable 12.5 Gram(s) IV Push once  dextrose 50% Injectable 25 Gram(s) IV Push once  dextrose 50% Injectable 25 Gram(s) IV Push once  haloperidol     Tablet 1 milliGRAM(s) Oral at bedtime  heparin  Injectable 5000 Unit(s) SubCutaneous every 8 hours  insulin glargine Injectable (LANTUS) 18 Unit(s) SubCutaneous at bedtime  insulin lispro (HumaLOG) corrective regimen sliding scale   SubCutaneous three times a day before meals  insulin lispro (HumaLOG) corrective regimen sliding scale   SubCutaneous at bedtime  insulin lispro Injectable (HumaLOG) 15 Unit(s) SubCutaneous three times a day before meals  metoprolol tartrate 100 milliGRAM(s) Oral two times a day  NIFEdipine XL 30 milliGRAM(s) Oral daily  OXcarbazepine 300 milliGRAM(s) Oral two times a day  sodium polystyrene sulfonate Suspension 15 Gram(s) Oral once    MEDICATIONS  (PRN):  acetaminophen   Tablet. 650 milliGRAM(s) Oral every 6 hours PRN mild, moderate pain  dextrose Gel 1 Dose(s) Oral once PRN Blood Glucose LESS THAN 70 milliGRAM(s)/deciliter  glucagon  Injectable 1 milliGRAM(s) IntraMuscular once PRN Glucose LESS THAN 70 milligrams/deciliter  haloperidol     Tablet 2 milliGRAM(s) Oral every 6 hours PRN Agitation  haloperidol    Injectable 2 milliGRAM(s) IV Push every 6 hours PRN Agitation  haloperidol    Injectable 2 milliGRAM(s) IntraMuscular every 6 hours PRN Agitation  oxyCODONE    IR 5 milliGRAM(s) Oral every 8 hours PRN For moderate to severe pain      Vital Signs Last 24 Hrs  T(C): 36.5 (02 Apr 2018 05:16), Max: 36.5 (02 Apr 2018 05:16)  T(F): 97.7 (02 Apr 2018 05:16), Max: 97.7 (02 Apr 2018 05:16)  HR: 67 (02 Apr 2018 05:16) (67 - 112)  BP: 133/79 (02 Apr 2018 05:16) (122/82 - 160/73)  BP(mean): --  RR: 18 (02 Apr 2018 05:16) (18 - 20)  SpO2: 99% (02 Apr 2018 05:16) (98% - 99%)  CAPILLARY BLOOD GLUCOSE      POCT Blood Glucose.: 129 mg/dL (02 Apr 2018 12:45)  POCT Blood Glucose.: 149 mg/dL (02 Apr 2018 09:04)  POCT Blood Glucose.: 198 mg/dL (01 Apr 2018 21:46)  POCT Blood Glucose.: 191 mg/dL (01 Apr 2018 17:52)    I&O's Summary      PHYSICAL EXAM:  GENERAL: NAD, well-developed  CHEST/LUNG: Clear to auscultation bilaterally; No wheeze  HEART: Regular rate and rhythm  ABDOMEN: Soft, Nontender, Nondistended  EXTREMITIES:  left leg prosthesis, Rt LE no edema  PSYCH: calm  NEUROLOGY: AAOx3  SKIN: No rashes or lesions    LABS:                        11.2   9.49  )-----------( 205      ( 02 Apr 2018 06:27 )             34.1     04-02    133<L>  |  87<L>  |  114<H>  ----------------------------<  140<H>  5.5<H>   |  23  |  8.23<H>    Ca    7.6<L>      02 Apr 2018 06:27  Phos  7.6     04-02  Mg     2.4     04-02                RADIOLOGY & ADDITIONAL TESTS:    Imaging Personally Reviewed:    Consultant(s) Notes Reviewed:      Care Discussed with Consultants/Other Providers:

## 2018-04-02 NOTE — PROGRESS NOTE ADULT - PROBLEM SELECTOR PLAN 2
- Hyperkalemia, kayexalate 1 dose today  - Will need SW evaluation re: transportation issues to and from HD   - As per chart review, pt is known to be noncompliant with dietary intake as well, will have dietician meet with patient

## 2018-04-03 DIAGNOSIS — N18.9 CHRONIC KIDNEY DISEASE, UNSPECIFIED: ICD-10-CM

## 2018-04-03 DIAGNOSIS — N25.0 RENAL OSTEODYSTROPHY: ICD-10-CM

## 2018-04-03 DIAGNOSIS — H54.62 UNQUALIFIED VISUAL LOSS, LEFT EYE, NORMAL VISION RIGHT EYE: ICD-10-CM

## 2018-04-03 DIAGNOSIS — I10 ESSENTIAL (PRIMARY) HYPERTENSION: ICD-10-CM

## 2018-04-03 LAB
BUN SERPL-MCNC: 126 MG/DL — HIGH (ref 7–23)
BUN SERPL-MCNC: 126 MG/DL — HIGH (ref 7–23)
CALCIUM SERPL-MCNC: 7.3 MG/DL — LOW (ref 8.4–10.5)
CALCIUM SERPL-MCNC: 7.3 MG/DL — LOW (ref 8.4–10.5)
CHLORIDE SERPL-SCNC: 89 MMOL/L — LOW (ref 98–107)
CHLORIDE SERPL-SCNC: 89 MMOL/L — LOW (ref 98–107)
CO2 SERPL-SCNC: 21 MMOL/L — LOW (ref 22–31)
CO2 SERPL-SCNC: 21 MMOL/L — LOW (ref 22–31)
CREAT SERPL-MCNC: 8.96 MG/DL — HIGH (ref 0.5–1.3)
CREAT SERPL-MCNC: 8.96 MG/DL — HIGH (ref 0.5–1.3)
GLUCOSE SERPL-MCNC: 138 MG/DL — HIGH (ref 70–99)
GLUCOSE SERPL-MCNC: 138 MG/DL — HIGH (ref 70–99)
HCG SERPL-ACNC: < 5 MIU/ML — SIGNIFICANT CHANGE UP
HCT VFR BLD CALC: 31 % — LOW (ref 34.5–45)
HGB BLD-MCNC: 10.2 G/DL — LOW (ref 11.5–15.5)
MAGNESIUM SERPL-MCNC: 2.5 MG/DL — SIGNIFICANT CHANGE UP (ref 1.6–2.6)
MCHC RBC-ENTMCNC: 29.6 PG — SIGNIFICANT CHANGE UP (ref 27–34)
MCHC RBC-ENTMCNC: 32.9 % — SIGNIFICANT CHANGE UP (ref 32–36)
MCV RBC AUTO: 89.9 FL — SIGNIFICANT CHANGE UP (ref 80–100)
NRBC # FLD: 0 — SIGNIFICANT CHANGE UP
PHOSPHATE SERPL-MCNC: 8.6 MG/DL — HIGH (ref 2.5–4.5)
PLATELET # BLD AUTO: 207 K/UL — SIGNIFICANT CHANGE UP (ref 150–400)
PMV BLD: 10.5 FL — SIGNIFICANT CHANGE UP (ref 7–13)
POTASSIUM SERPL-MCNC: 5.8 MMOL/L — HIGH (ref 3.5–5.3)
POTASSIUM SERPL-MCNC: 5.8 MMOL/L — HIGH (ref 3.5–5.3)
POTASSIUM SERPL-SCNC: 5.8 MMOL/L — HIGH (ref 3.5–5.3)
POTASSIUM SERPL-SCNC: 5.8 MMOL/L — HIGH (ref 3.5–5.3)
RBC # BLD: 3.45 M/UL — LOW (ref 3.8–5.2)
RBC # FLD: 13.3 % — SIGNIFICANT CHANGE UP (ref 10.3–14.5)
SODIUM SERPL-SCNC: 135 MMOL/L — SIGNIFICANT CHANGE UP (ref 135–145)
SODIUM SERPL-SCNC: 135 MMOL/L — SIGNIFICANT CHANGE UP (ref 135–145)
WBC # BLD: 9.36 K/UL — SIGNIFICANT CHANGE UP (ref 3.8–10.5)
WBC # FLD AUTO: 9.36 K/UL — SIGNIFICANT CHANGE UP (ref 3.8–10.5)

## 2018-04-03 PROCEDURE — 99222 1ST HOSP IP/OBS MODERATE 55: CPT

## 2018-04-03 PROCEDURE — 99233 SBSQ HOSP IP/OBS HIGH 50: CPT

## 2018-04-03 PROCEDURE — 99232 SBSQ HOSP IP/OBS MODERATE 35: CPT

## 2018-04-03 RX ORDER — HYDROMORPHONE HYDROCHLORIDE 2 MG/ML
1 INJECTION INTRAMUSCULAR; INTRAVENOUS; SUBCUTANEOUS ONCE
Refills: 0 | Status: DISCONTINUED | OUTPATIENT
Start: 2018-04-03 | End: 2018-04-03

## 2018-04-03 RX ORDER — DORZOLAMIDE HYDROCHLORIDE 20 MG/ML
1 SOLUTION/ DROPS OPHTHALMIC THREE TIMES A DAY
Refills: 0 | Status: DISCONTINUED | OUTPATIENT
Start: 2018-04-03 | End: 2018-04-10

## 2018-04-03 RX ORDER — ACETAZOLAMIDE 250 MG/1
500 TABLET ORAL ONCE
Refills: 0 | Status: COMPLETED | OUTPATIENT
Start: 2018-04-03 | End: 2018-04-03

## 2018-04-03 RX ORDER — OFLOXACIN 0.3 %
1 DROPS OPHTHALMIC (EYE)
Refills: 0 | Status: COMPLETED | OUTPATIENT
Start: 2018-04-03 | End: 2018-04-03

## 2018-04-03 RX ORDER — TIMOLOL 0.5 %
1 DROPS OPHTHALMIC (EYE)
Refills: 0 | Status: DISCONTINUED | OUTPATIENT
Start: 2018-04-03 | End: 2018-04-10

## 2018-04-03 RX ORDER — NIFEDIPINE 30 MG
1 TABLET, EXTENDED RELEASE 24 HR ORAL
Qty: 0 | Refills: 0 | DISCHARGE
Start: 2018-04-03

## 2018-04-03 RX ORDER — LATANOPROST 0.05 MG/ML
1 SOLUTION/ DROPS OPHTHALMIC; TOPICAL AT BEDTIME
Refills: 0 | Status: DISCONTINUED | OUTPATIENT
Start: 2018-04-03 | End: 2018-04-10

## 2018-04-03 RX ORDER — CALCITRIOL 0.5 UG/1
1 CAPSULE ORAL
Qty: 0 | Refills: 0 | DISCHARGE
Start: 2018-04-03

## 2018-04-03 RX ORDER — MORPHINE SULFATE 50 MG/1
2 CAPSULE, EXTENDED RELEASE ORAL ONCE
Refills: 0 | Status: DISCONTINUED | OUTPATIENT
Start: 2018-04-03 | End: 2018-04-03

## 2018-04-03 RX ORDER — DORZOLAMIDE HYDROCHLORIDE 20 MG/ML
1 SOLUTION/ DROPS OPHTHALMIC
Qty: 0 | Refills: 0 | DISCHARGE
Start: 2018-04-03

## 2018-04-03 RX ORDER — BRIMONIDINE TARTRATE 2 MG/MG
1 SOLUTION/ DROPS OPHTHALMIC THREE TIMES A DAY
Refills: 0 | Status: DISCONTINUED | OUTPATIENT
Start: 2018-04-03 | End: 2018-04-10

## 2018-04-03 RX ORDER — LATANOPROST 0.05 MG/ML
1 SOLUTION/ DROPS OPHTHALMIC; TOPICAL
Qty: 0 | Refills: 0 | DISCHARGE
Start: 2018-04-03

## 2018-04-03 RX ORDER — ACETAZOLAMIDE 250 MG/1
500 TABLET ORAL
Refills: 0 | Status: COMPLETED | OUTPATIENT
Start: 2018-04-03 | End: 2018-04-04

## 2018-04-03 RX ORDER — OXYCODONE HYDROCHLORIDE 5 MG/1
1 TABLET ORAL
Qty: 0 | Refills: 0 | DISCHARGE
Start: 2018-04-03

## 2018-04-03 RX ORDER — INSULIN GLARGINE 100 [IU]/ML
9 INJECTION, SOLUTION SUBCUTANEOUS AT BEDTIME
Refills: 0 | Status: DISCONTINUED | OUTPATIENT
Start: 2018-04-03 | End: 2018-04-10

## 2018-04-03 RX ORDER — TIMOLOL 0.5 %
1 DROPS OPHTHALMIC (EYE)
Qty: 0 | Refills: 0 | DISCHARGE
Start: 2018-04-03

## 2018-04-03 RX ORDER — OFLOXACIN 0.3 %
1 DROPS OPHTHALMIC (EYE)
Qty: 0 | Refills: 0 | DISCHARGE
Start: 2018-04-03

## 2018-04-03 RX ORDER — OFLOXACIN 0.3 %
1 DROPS OPHTHALMIC (EYE)
Refills: 0 | Status: DISCONTINUED | OUTPATIENT
Start: 2018-04-03 | End: 2018-04-03

## 2018-04-03 RX ORDER — HALOPERIDOL DECANOATE 100 MG/ML
1 INJECTION INTRAMUSCULAR
Qty: 0 | Refills: 0 | DISCHARGE
Start: 2018-04-03

## 2018-04-03 RX ORDER — BRIMONIDINE TARTRATE 2 MG/MG
1 SOLUTION/ DROPS OPHTHALMIC
Qty: 0 | Refills: 0 | DISCHARGE
Start: 2018-04-03

## 2018-04-03 RX ORDER — OXYCODONE HYDROCHLORIDE 5 MG/1
5 TABLET ORAL ONCE
Refills: 0 | Status: DISCONTINUED | OUTPATIENT
Start: 2018-04-03 | End: 2018-04-04

## 2018-04-03 RX ADMIN — MORPHINE SULFATE 2 MILLIGRAM(S): 50 CAPSULE, EXTENDED RELEASE ORAL at 10:50

## 2018-04-03 RX ADMIN — ACETAZOLAMIDE 500 MILLIGRAM(S): 250 TABLET ORAL at 09:58

## 2018-04-03 RX ADMIN — OXCARBAZEPINE 300 MILLIGRAM(S): 300 TABLET, FILM COATED ORAL at 17:48

## 2018-04-03 RX ADMIN — ATORVASTATIN CALCIUM 20 MILLIGRAM(S): 80 TABLET, FILM COATED ORAL at 21:02

## 2018-04-03 RX ADMIN — HEPARIN SODIUM 5000 UNIT(S): 5000 INJECTION INTRAVENOUS; SUBCUTANEOUS at 05:55

## 2018-04-03 RX ADMIN — Medication 4: at 13:18

## 2018-04-03 RX ADMIN — Medication 15 UNIT(S): at 13:18

## 2018-04-03 RX ADMIN — DORZOLAMIDE HYDROCHLORIDE 1 DROP(S): 20 SOLUTION/ DROPS OPHTHALMIC at 21:04

## 2018-04-03 RX ADMIN — DORZOLAMIDE HYDROCHLORIDE 1 DROP(S): 20 SOLUTION/ DROPS OPHTHALMIC at 14:48

## 2018-04-03 RX ADMIN — Medication 100 MILLIGRAM(S): at 06:02

## 2018-04-03 RX ADMIN — Medication 2001 MILLIGRAM(S): at 17:49

## 2018-04-03 RX ADMIN — HEPARIN SODIUM 5000 UNIT(S): 5000 INJECTION INTRAVENOUS; SUBCUTANEOUS at 13:18

## 2018-04-03 RX ADMIN — OXYCODONE HYDROCHLORIDE 5 MILLIGRAM(S): 5 TABLET ORAL at 14:47

## 2018-04-03 RX ADMIN — OXYCODONE HYDROCHLORIDE 5 MILLIGRAM(S): 5 TABLET ORAL at 23:08

## 2018-04-03 RX ADMIN — Medication 1 DROP(S): at 17:18

## 2018-04-03 RX ADMIN — OXYCODONE HYDROCHLORIDE 5 MILLIGRAM(S): 5 TABLET ORAL at 07:07

## 2018-04-03 RX ADMIN — Medication 15 UNIT(S): at 17:49

## 2018-04-03 RX ADMIN — MORPHINE SULFATE 2 MILLIGRAM(S): 50 CAPSULE, EXTENDED RELEASE ORAL at 11:05

## 2018-04-03 RX ADMIN — Medication 100 MILLIGRAM(S): at 17:48

## 2018-04-03 RX ADMIN — Medication 1 DROP(S): at 22:28

## 2018-04-03 RX ADMIN — BRIMONIDINE TARTRATE 1 DROP(S): 2 SOLUTION/ DROPS OPHTHALMIC at 21:01

## 2018-04-03 RX ADMIN — Medication 1 DROP(S): at 23:10

## 2018-04-03 RX ADMIN — OXYCODONE HYDROCHLORIDE 5 MILLIGRAM(S): 5 TABLET ORAL at 15:40

## 2018-04-03 RX ADMIN — ACETAZOLAMIDE 500 MILLIGRAM(S): 250 TABLET ORAL at 21:02

## 2018-04-03 RX ADMIN — Medication 1 DROP(S): at 20:29

## 2018-04-03 RX ADMIN — Medication 2001 MILLIGRAM(S): at 13:18

## 2018-04-03 RX ADMIN — BRIMONIDINE TARTRATE 1 DROP(S): 2 SOLUTION/ DROPS OPHTHALMIC at 14:49

## 2018-04-03 RX ADMIN — OXCARBAZEPINE 300 MILLIGRAM(S): 300 TABLET, FILM COATED ORAL at 05:57

## 2018-04-03 RX ADMIN — LATANOPROST 1 DROP(S): 0.05 SOLUTION/ DROPS OPHTHALMIC; TOPICAL at 21:02

## 2018-04-03 RX ADMIN — CALCITRIOL 0.25 MICROGRAM(S): 0.5 CAPSULE ORAL at 13:18

## 2018-04-03 RX ADMIN — INSULIN GLARGINE 9 UNIT(S): 100 INJECTION, SOLUTION SUBCUTANEOUS at 23:08

## 2018-04-03 RX ADMIN — Medication 1 DROP(S): at 14:00

## 2018-04-03 RX ADMIN — OXYCODONE HYDROCHLORIDE 5 MILLIGRAM(S): 5 TABLET ORAL at 07:55

## 2018-04-03 RX ADMIN — Medication 2: at 17:49

## 2018-04-03 RX ADMIN — Medication 1 DROP(S): at 17:49

## 2018-04-03 RX ADMIN — HYDROMORPHONE HYDROCHLORIDE 1 MILLIGRAM(S): 2 INJECTION INTRAMUSCULAR; INTRAVENOUS; SUBCUTANEOUS at 16:24

## 2018-04-03 RX ADMIN — HALOPERIDOL DECANOATE 1 MILLIGRAM(S): 100 INJECTION INTRAMUSCULAR at 21:02

## 2018-04-03 RX ADMIN — HYDROMORPHONE HYDROCHLORIDE 1 MILLIGRAM(S): 2 INJECTION INTRAMUSCULAR; INTRAVENOUS; SUBCUTANEOUS at 16:19

## 2018-04-03 RX ADMIN — Medication 100 MILLIGRAM(S): at 17:59

## 2018-04-03 NOTE — CHART NOTE - NSCHARTNOTEFT_GEN_A_CORE
Called by RN to see patient at 8:30am this morning for acute left eye vision loss. Pt states that she has had blurry vision in her left eye for months due to vitreous hemorrhage but now she c/o pain in and around her eye and a headache, that started in the middle of dialysis around 8:30 am She states it felt like "she is looking through a white film." Pt without any other complaints. NO chest pain, SOB, TORRES, PND, orthopnea, palpitations, diaphoresis, lightheadedness, dizziness, syncope, increased lowr extremity edema, fever chills, malaise, myalgias, anorexia, weight changes ( loss or gain), night sweats, generalized fatigue abdominal pain, N/V/C/D BRBPR, melena, urinary symptoms, cough, and wheezing. NO other neurological symptoms.     .     Physical Exam  Vital Signs Last 24 Hrs  T(C): 36.7 (2018 20:43), Max: 36.8 (2018 09:35)  T(F): 98 (2018 20:43), Max: 98.2 (2018 09:35)  HR: 80 (2018 20:43) (64 - 84)  BP: 149/78 (2018 20:43) (145/75 - 176/71)  BP(mean): --  RR: 17 (2018 20:43) (16 - 18)  SpO2: 99% (2018 20:43) (99% - 100%)  Appearance: Normal	  HEENT:   Normal oral mucosa, Pupils constricted OD , EOMI Visual acuity NLP OD,   Pupils: PERRL OU, no APD	  Lymphatic: No lymphadenopathy  Cardiovascular: Normal S1 S2, No JVD, No murmurs, No edema  Respiratory: Lungs clear to auscultation	  Psychiatry: A & O x 3, Mood & affect appropriate  Gastrointestinal:  Soft, Non-tender, + BS	  Skin: No rashes, No ecchymoses, No cyanosis  Neurologic: Non-focal  Extremities: Normal range of motion, No clubbing, cyanosis or edema  Vascular: Peripheral pulses palpable 2+ bilaterally    TELEMETRY: No arrythmias	       OTHER: 	  LABS                        10.2<L> [11.5 - 15.5]  9.36 [3.8 - 10.5] )-----------( 207 [150 - 400]    ( 2018 06:35 )             31.0<L> [34.5 - 45.0]    04-03    135  |  89<L>  |  126<H>  ----------------------------<  138<H>  5.8<H>   |  21<L>  |  8.96<H>    Ca    7.3<L>      2018 06:35  Phos  8.6     04-03  Mg     2.5     04-03      POCT Blood Glucose.: 140 mg/dL (2018 21:27)  POCT Blood Glucose.: 172 mg/dL (2018 17:35)  POCT Blood Glucose.: 235 mg/dL (2018 12:50)  POCT Blood Glucose.: 117 mg/dL (2018 08:40)  POCT Blood Glucose.: 138 mg/dL (2018 05:55)  POCT Blood Glucose.: 225 mg/dL (2018 22:41)    I&O's Detail    2018 07:01  -  2018 22:22  --------------------------------------------------------  IN:    Other: 400 mL  Total IN: 400 mL    OUT:    Other: 2400 mL  Total OUT: 2400 mL    Total NET: -2000 mL        Daily     Daily Weight in k.6 (2018 09:35)  Allergies    No Known Allergies    Intolerances      MEDICATIONS  (STANDING):  acetazolamide    Tablet 500 milliGRAM(s) Oral two times a day  artificial  tears Solution 1 Drop(s) Left EYE three times a day  atorvastatin 20 milliGRAM(s) Oral at bedtime  brimonidine 0.2% Ophthalmic Solution 1 Drop(s) Left EYE three times a day  calcitriol   Capsule 0.25 MICROGram(s) Oral daily  calcium acetate 2001 milliGRAM(s) Oral three times a day with meals  dextrose 5%. 1000 milliLiter(s) (50 mL/Hr) IV Continuous <Continuous>  dextrose 50% Injectable 12.5 Gram(s) IV Push once  dextrose 50% Injectable 25 Gram(s) IV Push once  dextrose 50% Injectable 25 Gram(s) IV Push once  dorzolamide 2% Ophthalmic Solution 1 Drop(s) Left EYE three times a day  haloperidol     Tablet 1 milliGRAM(s) Oral at bedtime  insulin glargine Injectable (LANTUS) 9 Unit(s) SubCutaneous at bedtime  insulin lispro (HumaLOG) corrective regimen sliding scale   SubCutaneous three times a day before meals  insulin lispro (HumaLOG) corrective regimen sliding scale   SubCutaneous at bedtime  insulin lispro Injectable (HumaLOG) 15 Unit(s) SubCutaneous three times a day before meals  latanoprost 0.005% Ophthalmic Solution 1 Drop(s) Left EYE at bedtime  metoprolol tartrate 100 milliGRAM(s) Oral two times a day  NIFEdipine XL 30 milliGRAM(s) Oral daily  ofloxacin 0.3% Solution 1 Drop(s) Left EYE every 3 hours  OXcarbazepine 300 milliGRAM(s) Oral two times a day  pregabalin 100 milliGRAM(s) Oral two times a day  timolol 0.5% Solution 1 Drop(s) Left EYE two times a day    MEDICATIONS  (PRN):  acetaminophen   Tablet. 650 milliGRAM(s) Oral every 6 hours PRN mild, moderate pain  dextrose Gel 1 Dose(s) Oral once PRN Blood Glucose LESS THAN 70 milliGRAM(s)/deciliter  glucagon  Injectable 1 milliGRAM(s) IntraMuscular once PRN Glucose LESS THAN 70 milligrams/deciliter  haloperidol     Tablet 2 milliGRAM(s) Oral every 6 hours PRN Agitation  haloperidol    Injectable 2 milliGRAM(s) IV Push every 6 hours PRN Agitation  haloperidol    Injectable 2 milliGRAM(s) IntraMuscular every 6 hours PRN Agitation  oxyCODONE    IR 5 milliGRAM(s) Oral every 6 hours PRN Severe Pain (7 - 10)    36 yo F ESRD, HTN, DM, diabetic retinopathy with acute left eye vision loss 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   Hospitalist notified immediately. Eye drops ordered and given by optho resident at bedside. IV diamox ordered as per optho resident and given. Optho resident at bedside with tonometry, slit lamp, fundoscopy.  Ok to finish HD as per hospitalist and no need for neuro eval.  Will closely monitor patient along with optho who performed ocular paracentesis at the beside.  As per optho give diamox PO tonight. Pt will be transferred to [place] first thing in the morning for urgent  vitrectomy and glaucoma tube surgery.  Case discussed later on with Dr Palencia who agrees with plan for surgery in am. Will repeat K in am before transfer to [place].

## 2018-04-03 NOTE — CONSULT NOTE ADULT - SUBJECTIVE AND OBJECTIVE BOX
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 Zafar), had recent VH OS but reported it was getting better. She could see things on her phone faintly yesterday, but today started to have pain in and around her eye and a headache, started in the middle of dialysis around 8:30 am and was associated with blurry vision, felt like she is looking through a white film.  No flashes or floaters     PMH:  HTN, DM, left BKA, ESRD (on hemodialysis T/Th/S),  Meds:   · 	amLODIPine 10 mg oral tablet: 1 tab(s) orally once a day, Last Dose Taken:    · 	oxyCODONE 30 mg oral tablet: 1 tab(s) orally every 6 hours, As Needed, Last Dose Taken:    · 	NovoLOG 100 units/mL subcutaneous solution: 14 unit(s) subcutaneous 3 times a day (before meals), decrease dose to 10-12units subcutaneous if premeal FSBG < 200, Last Dose Taken:    · 	Lantus 100 units/mL subcutaneous solution: 14 unit(s) subcutaneous once a day (at bedtime), Last Dose Taken:    · 	Ambien 5 mg oral tablet: 1 tab(s) orally once a day (at bedtime), Last Dose Taken:    · 	metoprolol tartrate 100 mg oral tablet: 1 tab(s) orally 2 times a day, Last Dose Taken:    · 	rosuvastatin 5 mg oral tablet: 1 tab(s) orally once a day (at bedtime), Last Dose Taken:    · 	calcium acetate 667 mg oral tablet: 3 tab(s) orally 3 times a day, Last Dose Taken:    · 	Lyrica 100 mg oral capsule: 2 cap(s) orally 2 times a day, Last Dose Taken:    · 	enalapril 10 mg oral tablet: 1 tab(s) orally every 8 hours, Last Dose Taken:    · 	OXcarbazepine 300 mg oral tablet: 1 tab(s) orally 2 times a day, Last Dose Taken:    POcHx (including surgeries/lasers/trauma):  Tube shunt OD; and as above  Drops: None  FamHx: None  Social Hx: None  Allergies: NKDA    ROS:  General (neg), Vision (per HPI), Head and Neck (neg), Pulm (neg), CV (neg), GI (neg),  (neg), Musculoskeletal (neg), Skin/Integ (neg), Neuro (neg), Endocrine (neg), Heme (neg), All/Immuno (neg)    Mood and Affect Appropriate ( x ),  Oriented to Time, Place, and Person x 3 ( x )    Ophthalmology Exam    Visual acuity (  Pupils: PERRL OU, no APD  Ttono: 16 OU  Extraocular movements (EOMs): Full OU, no pain, no diplopia  Confrontational Visual Field (CVF):  Full OU  Color Plates: 12/12 OU    Slit Lamp Exam (SLE)  External:  Flat OU  Lids/Lashes/Lacrimal Ducts: Flat OU    Sclera/Conjunctiva:  W+Q OU  Cornea: Cl OU  Anterior Chamber: D+F OU   Iris:  Flat OU  Lens:  Cl OU    Fundus Exam: dilated with 1% tropicamide and 2.5% phenylephrine @   Approval obtained from primary team for dilation  Patient aware that pupils can remained dilated for at least 4-6 hours  Exam performed with 20D lens    Vitreous: wnl OU  Cup/Disc: 0.4 OU  Macula:  wnl OU  Vessels:  wnl OU  Periphery: wnl OU    Diagnostic Testing:    Assessment:      Plan:      Follow-Up:  Patient should follow up in the [facility] Ophthalmology Practice within 1 week of discharge  [address] [place] [address]  [phone] (practice) or [phone] (clinic)    S/D/W Dr Murray (attending) [facility] Ophthalmology Consult Note    HPI:  37 F h/o HTN, DM and ESRD on HD  p/w diaphoresis and vomiting   found to be hyperkalemic, 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 Zafar), had recent VH OS but reported it was getting better. She could see things on her phone faintly yesterday, but today started to have pain in and around her eye and a headache, started in the middle of dialysis around 8:30 am and was associated with blurry vision, felt like she is looking through a white film.  No flashes or floaters     PMH:  HTN, DM, left BKA, ESRD (on hemodialysis T/Th/S),  Meds:   · 	amLODIPine 10 mg oral tablet: 1 tab(s) orally once a day, Last Dose Taken:    · 	oxyCODONE 30 mg oral tablet: 1 tab(s) orally every 6 hours, As Needed, Last Dose Taken:    · 	NovoLOG 100 units/mL subcutaneous solution: 14 unit(s) subcutaneous 3 times a day (before meals), decrease dose to 10-12units subcutaneous if premeal FSBG < 200, Last Dose Taken:    · 	Lantus 100 units/mL subcutaneous solution: 14 unit(s) subcutaneous once a day (at bedtime), Last Dose Taken:    · 	Ambien 5 mg oral tablet: 1 tab(s) orally once a day (at bedtime), Last Dose Taken:    · 	metoprolol tartrate 100 mg oral tablet: 1 tab(s) orally 2 times a day, Last Dose Taken:    · 	rosuvastatin 5 mg oral tablet: 1 tab(s) orally once a day (at bedtime), Last Dose Taken:    · 	calcium acetate 667 mg oral tablet: 3 tab(s) orally 3 times a day, Last Dose Taken:    · 	Lyrica 100 mg oral capsule: 2 cap(s) orally 2 times a day, Last Dose Taken:    · 	enalapril 10 mg oral tablet: 1 tab(s) orally every 8 hours, Last Dose Taken:    · 	OXcarbazepine 300 mg oral tablet: 1 tab(s) orally 2 times a day, Last Dose Taken:    POcHx (including surgeries/lasers/trauma):  Tube shunt OD; and as above  Drops: None  FamHx: None  Social Hx: None  Allergies: NKDA    ROS:  General (neg), Vision (per HPI), Head and Neck (neg), Pulm (neg), CV (neg), GI (neg),  (neg), Musculoskeletal (neg), Skin/Integ (neg), Neuro (neg), Endocrine (neg), Heme (neg), All/Immuno (neg)    Mood and Affect Appropriate ( x ),  Oriented to Time, Place, and Person x 3 ( x )    Ophthalmology Exam    Visual acuity NLP OD, CF at 6 in   Pupils: PERRL OU, no APD  Ttono: 66 OD, 71 OS, --> 61 (after gtts) ---> (after diamox 500mg IV)  Extraocular movements (EOMs): Full OU, no pain, no diplopia  Confrontational Visual Field (CVF):  unable due to poor vision OU  Color Plates: unable due to poor vision OU    Slit Lamp Exam (SLE)  External:  Flat OU  Lids/Lashes/Lacrimal Ducts: Flat OU    Sclera/Conjunctiva:  W+Q OD, tube shunt superotemporally, 1+ inj OS  Cornea: 2+ MCE OD, 1+MCE  Anterior Chamber: superotemporal tube abutting iris   Iris:  Flat OU, no NVI  Lens:  NS OU    Fundus Exam: deferred    A/P   37 F h/o HTN, DM and ESRD on HD  p/w diaphoresis and vomiting found to be hyperkalemic, 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 Zafar), had recent VH OS, now with acute eye pain and blurry vision   1. Neovascular glaucoma OS   - IOP 71  - brimonidine, latanoprost, timolol and dorzolamide x2 --> IOP down to 61 after gtts   - diamox 500mg IV x1   -c/w brimonidine, timolol and dorzolamide BID and latanoprost QHS   - Diamox sequels 500 mg PO tonight and tomorrow am   - followup at eye clinic tomorrow at 9 am    Follow-Up:  Patient should follow up in the [facility] Ophthalmology Practice tomorrow at 9 am   [address] [place] [address]  [phone] (practice) or [phone] (clinic)    S/D/W Dr Murray (attending) [facility] Ophthalmology Consult Note    HPI:  37 F h/o HTN, DM and ESRD on HD  p/w diaphoresis and vomiting   found to be hyperkalemic, 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 Zafar), had recent VH OS but reported it was getting better. She could see things on her phone faintly yesterday, but today started to have pain in and around her eye and a headache, started in the middle of dialysis around 8:30 am and was associated with blurry vision, felt like she is looking through a white film.  No flashes or floaters     PMH:  HTN, DM, left BKA, ESRD (on hemodialysis T/Th/S),  Meds:   · 	amLODIPine 10 mg oral tablet: 1 tab(s) orally once a day, Last Dose Taken:    · 	oxyCODONE 30 mg oral tablet: 1 tab(s) orally every 6 hours, As Needed, Last Dose Taken:    · 	NovoLOG 100 units/mL subcutaneous solution: 14 unit(s) subcutaneous 3 times a day (before meals), decrease dose to 10-12units subcutaneous if premeal FSBG < 200, Last Dose Taken:    · 	Lantus 100 units/mL subcutaneous solution: 14 unit(s) subcutaneous once a day (at bedtime), Last Dose Taken:    · 	Ambien 5 mg oral tablet: 1 tab(s) orally once a day (at bedtime), Last Dose Taken:    · 	metoprolol tartrate 100 mg oral tablet: 1 tab(s) orally 2 times a day, Last Dose Taken:    · 	rosuvastatin 5 mg oral tablet: 1 tab(s) orally once a day (at bedtime), Last Dose Taken:    · 	calcium acetate 667 mg oral tablet: 3 tab(s) orally 3 times a day, Last Dose Taken:    · 	Lyrica 100 mg oral capsule: 2 cap(s) orally 2 times a day, Last Dose Taken:    · 	enalapril 10 mg oral tablet: 1 tab(s) orally every 8 hours, Last Dose Taken:    · 	OXcarbazepine 300 mg oral tablet: 1 tab(s) orally 2 times a day, Last Dose Taken:    POcHx (including surgeries/lasers/trauma):  Tube shunt OD; and as above  Drops: None  FamHx: None  Social Hx: None  Allergies: NKDA    ROS:  General (neg), Vision (per HPI), Head and Neck (neg), Pulm (neg), CV (neg), GI (neg),  (neg), Musculoskeletal (neg), Skin/Integ (neg), Neuro (neg), Endocrine (neg), Heme (neg), All/Immuno (neg)    Mood and Affect Appropriate ( x ),  Oriented to Time, Place, and Person x 3 ( x )    Ophthalmology Exam    Visual acuity NLP OD, CF at 6 in   Pupils: PERRL OU, no APD  Ttono: 66 OD, 71 OS, --> 61 (after gtts) ---> (after diamox 500mg IV)  Extraocular movements (EOMs): Full OU, no pain, no diplopia  Confrontational Visual Field (CVF):  unable due to poor vision OU  Color Plates: unable due to poor vision OU    Slit Lamp Exam (SLE)  External:  Flat OU  Lids/Lashes/Lacrimal Ducts: Flat OU    Sclera/Conjunctiva:  W+Q OD, tube shunt superotemporally, 1+ inj OS  Cornea: 2+ MCE OD, 1+MCE  Anterior Chamber: superotemporal tube abutting iris   Iris:  Flat OU, no NVI  Lens:  NS OU    Fundus Exam: deferred    A/P   37 F h/o HTN, DM and ESRD on HD  p/w diaphoresis and vomiting found to be hyperkalemic, 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 Zafar), had recent VH OS, now with acute eye pain and blurry vision   1. Neovascular glaucoma OS   - IOP 71  - brimonidine, latanoprost, timolol and dorzolamide x2 --> IOP down to 61 after gtts   - diamox 500mg IV x1   -c/w brimonidine, timolol and dorzolamide BID and latanoprost QHS   - Diamox sequels 500 mg PO tonight and tomorrow am   - followup at with Dr. 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 Zafar), had recent VH OS but reported it was getting better. She could see things on her phone faintly yesterday, but today started to have pain in and around her eye and a headache, started in the middle of dialysis around 8:30 am and was associated with blurry vision, felt like she is looking through a white film.  No flashes or floaters     PMH:  HTN, DM, left BKA, ESRD (on hemodialysis T/Th/S),  Meds:   · 	amLODIPine 10 mg oral tablet: 1 tab(s) orally once a day, Last Dose Taken:    · 	oxyCODONE 30 mg oral tablet: 1 tab(s) orally every 6 hours, As Needed, Last Dose Taken:    · 	NovoLOG 100 units/mL subcutaneous solution: 14 unit(s) subcutaneous 3 times a day (before meals), decrease dose to 10-12units subcutaneous if premeal FSBG < 200, Last Dose Taken:    · 	Lantus 100 units/mL subcutaneous solution: 14 unit(s) subcutaneous once a day (at bedtime), Last Dose Taken:    · 	Ambien 5 mg oral tablet: 1 tab(s) orally once a day (at bedtime), Last Dose Taken:    · 	metoprolol tartrate 100 mg oral tablet: 1 tab(s) orally 2 times a day, Last Dose Taken:    · 	rosuvastatin 5 mg oral tablet: 1 tab(s) orally once a day (at bedtime), Last Dose Taken:    · 	calcium acetate 667 mg oral tablet: 3 tab(s) orally 3 times a day, Last Dose Taken:    · 	Lyrica 100 mg oral capsule: 2 cap(s) orally 2 times a day, Last Dose Taken:    · 	enalapril 10 mg oral tablet: 1 tab(s) orally every 8 hours, Last Dose Taken:    · 	OXcarbazepine 300 mg oral tablet: 1 tab(s) orally 2 times a day, Last Dose Taken:    POcHx (including surgeries/lasers/trauma):  Tube shunt OD; and as above  Drops: None  FamHx: None  Social Hx: None  Allergies: NKDA    ROS:  General (neg), Vision (per HPI), Head and Neck (neg), Pulm (neg), CV (neg), GI (neg),  (neg), Musculoskeletal (neg), Skin/Integ (neg), Neuro (neg), Endocrine (neg), Heme (neg), All/Immuno (neg)    Mood and Affect Appropriate ( x ),  Oriented to Time, Place, and Person x 3 ( x )    Ophthalmology Exam    Visual acuity NLP OD, CF at 6 in   Pupils: PERRL OU, no APD  Ttono: 66 OD, 71 OS, --> 61 (after gtts) ---> (after diamox 500mg IV)  Extraocular movements (EOMs): Full OU, no pain, no diplopia  Confrontational Visual Field (CVF):  unable due to poor vision OU  Color Plates: unable due to poor vision OU    Slit Lamp Exam (SLE)  External:  Flat OU  Lids/Lashes/Lacrimal Ducts: Flat OU    Sclera/Conjunctiva:  W+Q OD, tube shunt superotemporally, 1+ inj OS  Cornea: 2+ MCE OD, 1+MCE  Anterior Chamber: superotemporal tube abutting iris   Iris:  Flat OU, no NVI  Lens:  NS OU    Fundus Exam: deferred    A/P   37 F h/o HTN, DM and ESRD on HD  p/w diaphoresis and vomiting found to be hyperkalemic, 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 Zafar), had recent VH OS, now with acute eye pain and blurry vision   1. Neovascular glaucoma OS   - IOP 71  - brimonidine, latanoprost, timolol and dorzolamide x2 --> IOP down to 61 after gtts   - diamox 500mg IV x1   - c/w brimonidine, timolol and dorzolamide BID and latanoprost QHS   - Diamox sequels 500 mg PO tonight and tomorrow am   - followup at with Dr. 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 Zafar), had recent VH OS but reported it was getting better. She could see things on her phone faintly yesterday, but today started to have pain in and around her eye and a headache, started in the middle of dialysis around 8:30 am and was associated with blurry vision, felt like she is looking through a white film.  No flashes or floaters     PMH:  HTN, DM, left BKA, ESRD (on hemodialysis T/Th/S),  Meds:   · 	amLODIPine 10 mg oral tablet: 1 tab(s) orally once a day, Last Dose Taken:    · 	oxyCODONE 30 mg oral tablet: 1 tab(s) orally every 6 hours, As Needed, Last Dose Taken:    · 	NovoLOG 100 units/mL subcutaneous solution: 14 unit(s) subcutaneous 3 times a day (before meals), decrease dose to 10-12units subcutaneous if premeal FSBG < 200, Last Dose Taken:    · 	Lantus 100 units/mL subcutaneous solution: 14 unit(s) subcutaneous once a day (at bedtime), Last Dose Taken:    · 	Ambien 5 mg oral tablet: 1 tab(s) orally once a day (at bedtime), Last Dose Taken:    · 	metoprolol tartrate 100 mg oral tablet: 1 tab(s) orally 2 times a day, Last Dose Taken:    · 	rosuvastatin 5 mg oral tablet: 1 tab(s) orally once a day (at bedtime), Last Dose Taken:    · 	calcium acetate 667 mg oral tablet: 3 tab(s) orally 3 times a day, Last Dose Taken:    · 	Lyrica 100 mg oral capsule: 2 cap(s) orally 2 times a day, Last Dose Taken:    · 	enalapril 10 mg oral tablet: 1 tab(s) orally every 8 hours, Last Dose Taken:    · 	OXcarbazepine 300 mg oral tablet: 1 tab(s) orally 2 times a day, Last Dose Taken:    POcHx (including surgeries/lasers/trauma):  Tube shunt OD; and as above  Drops: None  FamHx: None  Social Hx: None  Allergies: NKDA    ROS:  General (neg), Vision (per HPI), Head and Neck (neg), Pulm (neg), CV (neg), GI (neg),  (neg), Musculoskeletal (neg), Skin/Integ (neg), Neuro (neg), Endocrine (neg), Heme (neg), All/Immuno (neg)    Mood and Affect Appropriate ( x ),  Oriented to Time, Place, and Person x 3 ( x )    Ophthalmology Exam    Visual acuity NLP OD, CF at 6 in   Pupils: PERRL OU, no APD  Ttono: 66 OD, 71 OS, --> 61 (after gtts) ---> (after diamox 500mg IV)  Extraocular movements (EOMs): Full OU, no pain, no diplopia  Confrontational Visual Field (CVF):  unable due to poor vision OU  Color Plates: unable due to poor vision OU    Slit Lamp Exam (SLE)  External:  Flat OU  Lids/Lashes/Lacrimal Ducts: Flat OU    Sclera/Conjunctiva:  W+Q OD, tube shunt superotemporally, 1+ inj OS  Cornea: 2+ MCE OD, 1+MCE  Anterior Chamber: superotemporal tube abutting iris   Iris:  Flat OU, no NVI  Lens:  NS OU    Fundus Exam:   Vitreous. 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 Zafar (retina) and Dr. Lombardo (glaucoma) - will do surgery at [facility] at 7 am on 4/4  - Needs medical clearance, NPO after midnight, transfer to be at [facility] by 6am tomorrow   - brimonidine, latanoprost, timolol and dorzolamide x2 --> IOP down to 61 after gtts   - diamox 500mg IV x1   - c/w brimonidine, timolol and dorzolamide BID and latanoprost QHS   - Diamox sequels 500 mg PO tonight and tomorrow am       Follow-Up:  Patient should follow up [facility] after discharge   [address] [place] [address] [phone]    S/D/W Dr Murray (attending) [facility] Ophthalmology Consult Note    HPI:  37 F h/o HTN, DM and ESRD on HD  p/w diaphoresis and vomiting   found to be hyperkalemic, 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 Zafar), had recent VH OS but reported it was getting better. She could see things on her phone faintly yesterday, but today started to have pain in and around her eye and a headache, started in the middle of dialysis around 8:30 am and was associated with blurry vision, felt like she is looking through a white film.  No flashes or floaters     PMH:  HTN, DM, left BKA, ESRD (on hemodialysis T/Th/S),  Meds:   · 	amLODIPine 10 mg oral tablet: 1 tab(s) orally once a day, Last Dose Taken:    · 	oxyCODONE 30 mg oral tablet: 1 tab(s) orally every 6 hours, As Needed, Last Dose Taken:    · 	NovoLOG 100 units/mL subcutaneous solution: 14 unit(s) subcutaneous 3 times a day (before meals), decrease dose to 10-12units subcutaneous if premeal FSBG < 200, Last Dose Taken:    · 	Lantus 100 units/mL subcutaneous solution: 14 unit(s) subcutaneous once a day (at bedtime), Last Dose Taken:    · 	Ambien 5 mg oral tablet: 1 tab(s) orally once a day (at bedtime), Last Dose Taken:    · 	metoprolol tartrate 100 mg oral tablet: 1 tab(s) orally 2 times a day, Last Dose Taken:    · 	rosuvastatin 5 mg oral tablet: 1 tab(s) orally once a day (at bedtime), Last Dose Taken:    · 	calcium acetate 667 mg oral tablet: 3 tab(s) orally 3 times a day, Last Dose Taken:    · 	Lyrica 100 mg oral capsule: 2 cap(s) orally 2 times a day, Last Dose Taken:    · 	enalapril 10 mg oral tablet: 1 tab(s) orally every 8 hours, Last Dose Taken:    · 	OXcarbazepine 300 mg oral tablet: 1 tab(s) orally 2 times a day, Last Dose Taken:    POcHx (including surgeries/lasers/trauma):  Tube shunt OD; and as above  Drops: None  FamHx: None  Social Hx: None  Allergies: NKDA    ROS:  General (neg), Vision (per HPI), Head and Neck (neg), Pulm (neg), CV (neg), GI (neg),  (neg), Musculoskeletal (neg), Skin/Integ (neg), Neuro (neg), Endocrine (neg), Heme (neg), All/Immuno (neg)    Mood and Affect Appropriate ( x ),  Oriented to Time, Place, and Person x 3 ( x )    Ophthalmology Exam    Visual acuity NLP OD, CF at 6 in   Pupils: PERRL OU, no APD  Ttono: 66 OD, 71 OS, --> 61 (after gtts) ---> (after diamox 500mg IV)  Extraocular movements (EOMs): Full OU, no pain, no diplopia  Confrontational Visual Field (CVF):  unable due to poor vision OU  Color Plates: unable due to poor vision OU    Slit Lamp Exam (SLE)  External:  Flat OU  Lids/Lashes/Lacrimal Ducts: Flat OU    Sclera/Conjunctiva:  W+Q OD, tube shunt superotemporally, 1+ inj OS  Cornea: 2+ MCE OD, 1+MCE  Anterior Chamber: superotemporal tube abutting iris   Iris:  Flat OU, no NVI  Lens:  NS OU    Fundus Exam:   Vitreous. 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 Zafar (retina) and Dr. Lombardo (glaucoma) - will do surgery at [facility] at 7 am on 4/4  - Needs medical clearance, NPO after midnight, transfer to be at [facility] by 6am tomorrow   - brimonidine, latanoprost, timolol and dorzolamide x2 --> IOP down to 61 after gtts   - diamox 500mg IV x1   - c/w brimonidine, timolol and dorzolamide BID and latanoprost QHS   - Diamox sequels 500 mg PO tonight and tomorrow am     ***Addendum*** IOP 17 at 10pm, FBS likely due to betadyne.  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 Zafar), had recent VH OS but reported it was getting better. She could see things on her phone faintly yesterday, but today started to have pain in and around her eye and a headache, started in the middle of dialysis around 8:30 am and was associated with blurry vision, felt like she is looking through a white film.  No flashes or floaters     PMH:  HTN, DM, left BKA, ESRD (on hemodialysis T/Th/S),  Meds:   · 	amLODIPine 10 mg oral tablet: 1 tab(s) orally once a day, Last Dose Taken:    · 	oxyCODONE 30 mg oral tablet: 1 tab(s) orally every 6 hours, As Needed, Last Dose Taken:    · 	NovoLOG 100 units/mL subcutaneous solution: 14 unit(s) subcutaneous 3 times a day (before meals), decrease dose to 10-12units subcutaneous if premeal FSBG < 200, Last Dose Taken:    · 	Lantus 100 units/mL subcutaneous solution: 14 unit(s) subcutaneous once a day (at bedtime), Last Dose Taken:    · 	Ambien 5 mg oral tablet: 1 tab(s) orally once a day (at bedtime), Last Dose Taken:    · 	metoprolol tartrate 100 mg oral tablet: 1 tab(s) orally 2 times a day, Last Dose Taken:    · 	rosuvastatin 5 mg oral tablet: 1 tab(s) orally once a day (at bedtime), Last Dose Taken:    · 	calcium acetate 667 mg oral tablet: 3 tab(s) orally 3 times a day, Last Dose Taken:    · 	Lyrica 100 mg oral capsule: 2 cap(s) orally 2 times a day, Last Dose Taken:    · 	enalapril 10 mg oral tablet: 1 tab(s) orally every 8 hours, Last Dose Taken:    · 	OXcarbazepine 300 mg oral tablet: 1 tab(s) orally 2 times a day, Last Dose Taken:    POcHx (including surgeries/lasers/trauma):  Tube shunt OD; and as above  Drops: None  FamHx: None  Social Hx: None  Allergies: NKDA    ROS:  General (neg), Vision (per HPI), Head and Neck (neg), Pulm (neg), CV (neg), GI (neg),  (neg), Musculoskeletal (neg), Skin/Integ (neg), Neuro (neg), Endocrine (neg), Heme (neg), All/Immuno (neg)    Mood and Affect Appropriate ( x ),  Oriented to Time, Place, and Person x 3 ( x )    Ophthalmology Exam    Visual acuity NLP OD, CF at 6 in --> improved to 20/400 after IOP lowered w/ paracentesis   Pupils: PERRL OU, no APD  Ttono: 66 OD, 71 OS, --> 61 (after gtts) ---> 65 (after diamox 500mg IV)  Extraocular movements (EOMs): Full OU, no pain, no diplopia  Confrontational Visual Field (CVF):  unable due to poor vision OU  Color Plates: unable due to poor vision OU    Slit Lamp Exam (SLE)  External:  Flat OU  Lids/Lashes/Lacrimal Ducts: Flat OU    Sclera/Conjunctiva:  W+Q OD, tube shunt superotemporally, 1+ inj OS  Cornea: 2+ MCE OD, 1+MCE  Anterior Chamber: superotemporal tube abutting iris   Iris:  Flat OU, no NVI  Lens:  NS OU    Fundus Exam:   Vitreous. 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 Zafar (retina) and Dr. Lombardo (glaucoma) - will do surgery at [facility] at 7 am on 4/4  - Needs medical clearance, NPO after midnight, transfer to be at [facility] by 6am tomorrow   - c/w brimonidine, timolol and dorzolamide BID and latanoprost QHS   - Diamox sequels 500 mg PO tonight and tomorrow am     ***Addendum*** IOP 17 at 10pm, FBS likely due to betadyne.  Can give PF artificial tears [id number]    Follow-Up:  Patient should follow up [facility] after discharge   [address] [place] [id number] [phone]    S/D/W Dr Murray (attending) [facility] Ophthalmology Consult Note    HPI:  37 F h/o HTN, DM and ESRD on HD  p/w diaphoresis and vomiting   found to be hyperkalemic, also monocular 2/2 glaucoma (?neovascualr) OD with h/o diabetic retinopathy OS s/p injections and laser (sees Dr. Zafar), had recent VH OS but reported it was getting better. She could see things on her phone faintly yesterday, but today started to have pain in and around her eye and a headache, started in the middle of dialysis around 8:30 am and was associated with blurry vision, felt like she is looking through a white film.  No flashes or floaters     PMH:  HTN, DM, left BKA, ESRD (on hemodialysis T/Th/S),  Meds:   · 	amLODIPine 10 mg oral tablet: 1 tab(s) orally once a day, Last Dose Taken:    · 	oxyCODONE 30 mg oral tablet: 1 tab(s) orally every 6 hours, As Needed, Last Dose Taken:    · 	NovoLOG 100 units/mL subcutaneous solution: 14 unit(s) subcutaneous 3 times a day (before meals), decrease dose to 10-12units subcutaneous if premeal FSBG < 200, Last Dose Taken:    · 	Lantus 100 units/mL subcutaneous solution: 14 unit(s) subcutaneous once a day (at bedtime), Last Dose Taken:    · 	Ambien 5 mg oral tablet: 1 tab(s) orally once a day (at bedtime), Last Dose Taken:    · 	metoprolol tartrate 100 mg oral tablet: 1 tab(s) orally 2 times a day, Last Dose Taken:    · 	rosuvastatin 5 mg oral tablet: 1 tab(s) orally once a day (at bedtime), Last Dose Taken:    · 	calcium acetate 667 mg oral tablet: 3 tab(s) orally 3 times a day, Last Dose Taken:    · 	Lyrica 100 mg oral capsule: 2 cap(s) orally 2 times a day, Last Dose Taken:    · 	enalapril 10 mg oral tablet: 1 tab(s) orally every 8 hours, Last Dose Taken:    · 	OXcarbazepine 300 mg oral tablet: 1 tab(s) orally 2 times a day, Last Dose Taken:    POcHx (including surgeries/lasers/trauma):  Tube shunt OD; and as above  Drops: None  FamHx: None  Social Hx: None  Allergies: NKDA    ROS:  General (neg), Vision (per HPI), Head and Neck (neg), Pulm (neg), CV (neg), GI (neg),  (neg), Musculoskeletal (neg), Skin/Integ (neg), Neuro (neg), Endocrine (neg), Heme (neg), All/Immuno (neg)    Mood and Affect Appropriate ( x ),  Oriented to Time, Place, and Person x 3 ( x )    Ophthalmology Exam    Visual acuity NLP OD, CF at 6 in --> improved to 20/400 after IOP lowered w/ paracentesis   Pupils: PERRL OU, no APD  Ttono: 66 OD, 71 OS, --> 61 (1 hr after gtts) ---> 65 (2 hrs after diamox 500mg IV)  Extraocular movements (EOMs): Full OU, no pain, no diplopia  Confrontational Visual Field (CVF):  unable due to poor vision OU  Color Plates: unable due to poor vision OU    Slit Lamp Exam (SLE)  External:  Flat OU  Lids/Lashes/Lacrimal Ducts: Flat OU    Sclera/Conjunctiva:  W+Q OD, tube shunt superotemporally with 2+ bleb over plate, 1+ inj OS  Cornea: 2+ MCE OD, 1+MCE  Anterior Chamber: superotemporal tube abutting iris   Iris:  Flat OU, no NVI OU  Lens:  NS OU    Fundus Exam:   Vitreous. hazy view 2/2 NS OD; Hazy view 2/2 heme and NS   ON: regressed nvd OD: poor view  Mac: TRD OD: flat OS  Vessels: TRD OD; traction OS   Periphery TRD OD: old and new heme inferiorly OS     Gonio - open to posterior TM, with 2+ pigmentation and no NVA or PAS, no recession OS    Procedure note - AC paracentesis, RBA d/w pt, IC obtained (in chart);   One drop of sterile tetracaine was placed in the left eye.  Then, one drop of 5% betadine was placed and a sterile lid speculum placed.  At the slit lamp, a 30G cc needle on a 1cc syringe with the plunger removed was used to remove <0.01 mL of aqueous from the anterior chamber.  The A/C was entered just anterior to the limbus at 4 oclock.  There were no complications, IOP came down to 28 and 26, VA 20/200 and reduced pain immediately after the procedure.  A drop of ofloxacin was placed in the left eye after the procedure and a clear shield was placed on the eye to remain in place for the rest of the day.  The pt was instructed not to touch the eye.    A/P   37 F h/o HTN, DM and ESRD on HD  p/w diaphoresis and vomiting found to be hyperkalemic, also monocular 2/2 glaucoma (?neovascular) OD with h/o diabetic retinopathy OS s/p injections and laser (sees Dr. 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  Pt has no NVI/NVA to suggest NVG.  - IOP 71, no NVA, open angles, with cells in the ac (difficult to say if white or tan), and chronic VH  - brimonidine, latanoprost, timolol and dorzolamide x2 --> IOP down to 61 after gtts   - diamox 500mg IV x1 - IOP unchanged  - IOP not responding to diamox so paracentesis brought down to 28,26,25 OS - c/w Oflox QID OS x 4 days  - patient needs urgent vitrectomy and glaucoma tube surgery,   - D/w Dr. Zafar (retina) and Dr. Lombardo (glaucoma) - will do surgery at [place] at 7 am on 4/4  - Needs medical clearance, NPO after midnight, transfer to be at [place] by 6am tomorrow   - c/w brimonidine, timolol and dorzolamide BID and latanoprost QHS   - Diamox sequels 500 mg PO tonight and tomorrow am.  Ok'ed by primary team and nephrology to give     ***Addendum*** IOP 17 at 10pm, FBS likely due to betadine.  Can give PF artificial tears [id number]    Follow-Up:  Patient should follow up [facility] after discharge   [address] [place] [address] [id number] [phone]    S/D/W Dr Murray (attending)  D/W Dr. Zafar  D/W Dr. Lombardo

## 2018-04-03 NOTE — PROGRESS NOTE ADULT - PROBLEM SELECTOR PLAN 2
.5. Patient on calcitriol however should be on hecterol. Will let primary nephrologist address   calcium low will do HD on high calcium bath (alb 4.1)  Phos 8.6 - would enforce strict dietary compliance. c/w with phoslo

## 2018-04-03 NOTE — PROGRESS NOTE ADULT - SUBJECTIVE AND OBJECTIVE BOX
[facility] DIVISION OF KIDNEY DISEASES AND HYPERTENSION -- HEMODIALYSIS NOTE  --------------------------------------------------------------------------------  Dayron Miahim     --------------------------------------------------------------------------------  Chief Complaint: ESRD/Ongoing hemodialysis requirement    24 hour events/subjective:  Patient complained of blurry vision in her right eye worse than before while on HD      PAST HISTORY  --------------------------------------------------------------------------------  No significant changes to PMH, PSH, FHx, SHx, unless otherwise noted    ALLERGIES & MEDICATIONS  --------------------------------------------------------------------------------  Allergies    No Known Allergies    Intolerances      Standing Inpatient Medications  atorvastatin 20 milliGRAM(s) Oral at bedtime  calcitriol   Capsule 0.25 MICROGram(s) Oral daily  calcium acetate 2001 milliGRAM(s) Oral three times a day with meals  dextrose 5%. 1000 milliLiter(s) IV Continuous <Continuous>  dextrose 50% Injectable 12.5 Gram(s) IV Push once  dextrose 50% Injectable 25 Gram(s) IV Push once  dextrose 50% Injectable 25 Gram(s) IV Push once  haloperidol     Tablet 1 milliGRAM(s) Oral at bedtime  heparin  Injectable 5000 Unit(s) SubCutaneous every 8 hours  insulin glargine Injectable (LANTUS) 18 Unit(s) SubCutaneous at bedtime  insulin lispro (HumaLOG) corrective regimen sliding scale   SubCutaneous three times a day before meals  insulin lispro (HumaLOG) corrective regimen sliding scale   SubCutaneous at bedtime  insulin lispro Injectable (HumaLOG) 15 Unit(s) SubCutaneous three times a day before meals  metoprolol tartrate 100 milliGRAM(s) Oral two times a day  NIFEdipine XL 30 milliGRAM(s) Oral daily  OXcarbazepine 300 milliGRAM(s) Oral two times a day  pregabalin 100 milliGRAM(s) Oral two times a day    PRN Inpatient Medications  acetaminophen   Tablet. 650 milliGRAM(s) Oral every 6 hours PRN  dextrose Gel 1 Dose(s) Oral once PRN  glucagon  Injectable 1 milliGRAM(s) IntraMuscular once PRN  haloperidol     Tablet 2 milliGRAM(s) Oral every 6 hours PRN  haloperidol    Injectable 2 milliGRAM(s) IV Push every 6 hours PRN  haloperidol    Injectable 2 milliGRAM(s) IntraMuscular every 6 hours PRN  oxyCODONE    IR 5 milliGRAM(s) Oral every 6 hours PRN      REVIEW OF SYSTEMS  --------------------------------------------------------------------------------  Constitutional: [ ] Fever [ ] Chills [ ] Fatigue [ ] Weight change   HEENT: [ x] Blurred vision [ ] Eye Pain [ ] Headache [ ] Runny nose [ ] Sore Throat   Respiratory: [ ] Cough [ ] Wheezing [ ] Shortness of breath  Cardiovascular: [ ] Chest Pain [ ] Palpitations [ ] TORRES [ ] PND [ ] Orthopnea  Gastrointestinal: [ ] Abdominal Pain [ ] Diarrhea [ ] Constipation [ ] Hemorrhoids [ ] Nausea [ ] Vomiting  Genitourinary: [ ] Nocturia [ ] Dysuria [ ] Incontinence  Extremities: [ ] Swelling [ ] Joint Pain  Neurologic: [ ] Focal deficit [ ] Paresthesias [ ] Syncope  Lymphatic: [ ] Swelling [ ] Lymphadenopathy   Skin: [ ] Rash [ ] Ecchymoses [ ] Wounds [ ] Lesions  Psychiatry: [ ] Depression [ ] Suicidal/Homicidal Ideation [ ] Anxiety [ ] Sleep Disturbances  [x ] 10 point review of systems is otherwise negative except as mentioned above              [ ]Unable to obtain  All other systems were reviewed and are negative, except as noted.    VITALS/PHYSICAL EXAM  --------------------------------------------------------------------------------  T(C): 36.5 (18 @ 06:25), Max: 37.3 (18 @ 14:46)  HR: 66 (18 @ 06:25) (64 - 74)  BP: 161/80 (18 @ 06:25) (127/66 - 161/80)  RR: 18 (18 @ 06:25) (16 - 18)  SpO2: 100% (18 @ 05:57) (98% - 100%)  Wt(kg): --      Daily     Daily Weight in k.6 (2018 06:25)  I&O's Summary          Physical Exam:  	 Gen: NAD   	HEENT: anicteric  	Pulm: CTA B/L   	CV: RRR  	Back: No dependent edema  	Abd: soft, nontender, nondistended  	: No gunter  	LE: Warm, no edema  	Neuro: no asterixis  	Skin: Warm, without rashes  	Vascular access: LAVF with thrill and bruit    LABS/STUDIES  --------------------------------------------------------------------------------              10.2   9.36  >-----------<  207      [18 @ 06:35]              31.0     135  |  89  |  126  ----------------------------<  138      [18 06:35]  5.8   |  21  |  8.96        Ca     7.3     [18 06:35]      Mg     2.5     [18 06:35]      Phos  8.6     [18 06:35]            .5 (Ca --)      [18 @ 06:00]   --  HbA1c 7.7      [18 @ 09:20]    HBsAb 31.9      [18 @ 16:50]  HBsAg NEGATIVE      [18 16:50]  HBcAb Reactive      [18 16:50]  HCV 0.20, Nonreactive Hepatitis C AB  S/CO Ratio                        Interpretation  < 1.0                                     Non-Reactive  1.0 - 4.9                           Weakly-Reactive  > 5.0                                 Reactive  Non-Reactive: Aperson with a non-reactive HCV antibody  result is considered uninfected.  No further action is  needed unless recent infection is suspected.  In these  cases, consider repeat testing later to detect  seroconversion..  Weakly-Reactive: HCV antibody test is abnormal, HCV RNA  Qualitative test will follow.  Reactive: HCV antibody test is abnormal, HCV RNA  Qualitative test will follow.  Note: HCV antibody testing is performed on the Abbott   system.      [18 @ 16:50]        Radiology  --------------------------------------------------------------------------------    --------------------------------------------------------------------------------  Dayron Ayala    [facility] DIVISION OF KIDNEY DISEASES AND HYPERTENSION -- HEMODIALYSIS NOTE  --------------------------------------------------------------------------------  Dayron Miahim     --------------------------------------------------------------------------------  Chief Complaint: ESRD/Ongoing hemodialysis requirement    24 hour events/subjective:  Patient complained of blurry vision in her right eye worse than before while on HD  Optho called for urgent consult to r/o acute glaucoma      PAST HISTORY  --------------------------------------------------------------------------------  No significant changes to PMH, PSH, FHx, SHx, unless otherwise noted    ALLERGIES & MEDICATIONS  --------------------------------------------------------------------------------  Allergies    No Known Allergies    Intolerances      Standing Inpatient Medications  atorvastatin 20 milliGRAM(s) Oral at bedtime  calcitriol   Capsule 0.25 MICROGram(s) Oral daily  calcium acetate 2001 milliGRAM(s) Oral three times a day with meals  dextrose 5%. 1000 milliLiter(s) IV Continuous <Continuous>  dextrose 50% Injectable 12.5 Gram(s) IV Push once  dextrose 50% Injectable 25 Gram(s) IV Push once  dextrose 50% Injectable 25 Gram(s) IV Push once  haloperidol     Tablet 1 milliGRAM(s) Oral at bedtime  heparin  Injectable 5000 Unit(s) SubCutaneous every 8 hours  insulin glargine Injectable (LANTUS) 18 Unit(s) SubCutaneous at bedtime  insulin lispro (HumaLOG) corrective regimen sliding scale   SubCutaneous three times a day before meals  insulin lispro (HumaLOG) corrective regimen sliding scale   SubCutaneous at bedtime  insulin lispro Injectable (HumaLOG) 15 Unit(s) SubCutaneous three times a day before meals  metoprolol tartrate 100 milliGRAM(s) Oral two times a day  NIFEdipine XL 30 milliGRAM(s) Oral daily  OXcarbazepine 300 milliGRAM(s) Oral two times a day  pregabalin 100 milliGRAM(s) Oral two times a day    PRN Inpatient Medications  acetaminophen   Tablet. 650 milliGRAM(s) Oral every 6 hours PRN  dextrose Gel 1 Dose(s) Oral once PRN  glucagon  Injectable 1 milliGRAM(s) IntraMuscular once PRN  haloperidol     Tablet 2 milliGRAM(s) Oral every 6 hours PRN  haloperidol    Injectable 2 milliGRAM(s) IV Push every 6 hours PRN  haloperidol    Injectable 2 milliGRAM(s) IntraMuscular every 6 hours PRN  oxyCODONE    IR 5 milliGRAM(s) Oral every 6 hours PRN      REVIEW OF SYSTEMS  --------------------------------------------------------------------------------  Constitutional: [ ] Fever [ ] Chills [ ] Fatigue [ ] Weight change   HEENT: [ x] Blurred vision [ ] Eye Pain [ ] Headache [ ] Runny nose [ ] Sore Throat   Respiratory: [ ] Cough [ ] Wheezing [ ] Shortness of breath  Cardiovascular: [ ] Chest Pain [ ] Palpitations [ ] TORRES [ ] PND [ ] Orthopnea  Gastrointestinal: [ ] Abdominal Pain [ ] Diarrhea [ ] Constipation [ ] Hemorrhoids [ ] Nausea [ ] Vomiting  Genitourinary: [ ] Nocturia [ ] Dysuria [ ] Incontinence  Extremities: [ ] Swelling [ ] Joint Pain  Neurologic: [ ] Focal deficit [ ] Paresthesias [ ] Syncope  Lymphatic: [ ] Swelling [ ] Lymphadenopathy   Skin: [ ] Rash [ ] Ecchymoses [ ] Wounds [ ] Lesions  Psychiatry: [ ] Depression [ ] Suicidal/Homicidal Ideation [ ] Anxiety [ ] Sleep Disturbances  [x ] 10 point review of systems is otherwise negative except as mentioned above              [ ]Unable to obtain  All other systems were reviewed and are negative, except as noted.    VITALS/PHYSICAL EXAM  --------------------------------------------------------------------------------  T(C): 36.5 (18 @ 06:25), Max: 37.3 (18 @ 14:46)  HR: 66 (18 @ 06:25) (64 - 74)  BP: 161/80 (18 @ 06:25) (127/66 - 161/80)  RR: 18 (18 @ 06:25) (16 - 18)  SpO2: 100% (18 @ 05:57) (98% - 100%)  Wt(kg): --      Daily     Daily Weight in k.6 (2018 06:25)  I&O's Summary          Physical Exam:  	 Gen: NAD   	HEENT: anicteric, R pupil constricted> L pupil  	Pulm: CTA B/L   	CV: RRR  	Back: No dependent edema  	Abd: soft, nontender, nondistended  	: No gunter  	LE: Warm, no edema  	Neuro: no asterixis  	Skin: Warm, without rashes  	Vascular access: LAVF with thrill and bruit    LABS/STUDIES  --------------------------------------------------------------------------------              10.2   9.36  >-----------<  207      [18 06:35]              31.0     135  |  89  |  126  ----------------------------<  138      [18 06:35]  5.8   |  21  |  8.96        Ca     7.3     [18 06:35]      Mg     2.5     [18:35]      Phos  8.6     [18:35]            .5 (Ca --)      [18 06:00]   --  HbA1c 7.7      [18 09:20]    HBsAb 31.9      [18 16:50]  HBsAg NEGATIVE      [18 16:50]  HBcAb Reactive      [18 16:50]  HCV 0.20, Nonreactive Hepatitis C AB  S/CO Ratio                        Interpretation  < 1.0                                     Non-Reactive  1.0 - 4.9                           Weakly-Reactive  > 5.0                                 Reactive  Non-Reactive: Aperson with a non-reactive HCV antibody  result is considered uninfected.  No further action is  needed unless recent infection is suspected.  In these  cases, consider repeat testing later to detect  seroconversion..  Weakly-Reactive: HCV antibody test is abnormal, HCV RNA  Qualitative test will follow.  Reactive: HCV antibody test is abnormal, HCV RNA  Qualitative test will follow.  Note: HCV antibody testing is performed on the Abbott   system.      [18 @ 16:50]        Radiology  --------------------------------------------------------------------------------    --------------------------------------------------------------------------------  Dayron Ayala

## 2018-04-03 NOTE — PROGRESS NOTE ADULT - ATTENDING COMMENTS
Seen for ESRD and HD. Tolerated HD well today. Seen by ophtho for eye pain, dx with glaucoma and started on diamox. No need for JONNY. Cont phoslo, add sevelamer 800 mg PO TID if phos remains elevated. Cont calcitriol however, hectorol would be the ideal choice in this patient with ESRD and secondary hyperPTH (switch can be made as an outpatient). Anticipate next routine HD on thurs, 4/5. Seen for ESRD and HD. Tolerated HD well today. Seen by ophtho for eye pain, dx with glaucoma and started on diamox. No need for JONNY. Cont phoslo, add sevelamer 800 mg PO TID if phos remains elevated. Cont calcitriol however, hectorol would be the ideal choice in this patient with ESRD and secondary hyperPTH (switch can be made as an outpatient). BP slightly elevated likely due to pain, monitor for now. Anticipate next routine HD on thurs, 4/5.

## 2018-04-03 NOTE — PROGRESS NOTE ADULT - PROBLEM SELECTOR PLAN 1
Urgent Eye exam in progress, received 1 dose of diamox, dorzolamide, brimonidine , latanoprost, timolol, ofloxacin eye drops per ophthalmology, discussed with Ophthalmology attending, awaiting dilated eye exam, f/u official recs     eye drops has b Urgent Eye exam in progress, received 1 dose of diamox, dorzolamide, brimonidine , latanoprost, timolol, ofloxacin eye drops per ophthalmology, discussed with Ophthalmology attending, awaiting dilated eye exam, f/u official recs Urgent Eye exam in progress, received 1 dose of diamox, dorzolamide, brimonidine , latanoprost, timolol, ofloxacin eye drops per ophthalmology, discussed with Ophthalmology attending, awaiting dilated eye exam, f/u official recs  Addendum 3pm: Discussed with ophthalmology per them pt needs eye sx, scheduled for sx with JUAN RAMON Zafar and Delfin Lombardo at [place] on 4/4 at 7am pt will be transported to [place] at 5am for sx and will return to [facility] after sx the same day, will check bmp at 4am, npo from [place] for sx, hsq d/c, pt is medically optimized for the needed eye sx, discussed with Opthalmology resident, bed at [facility] to be held for pt discussed with transfer center as well.

## 2018-04-03 NOTE — PROGRESS NOTE ADULT - SUBJECTIVE AND OBJECTIVE BOX
Patient is a 37y old  Female who presents with a chief complaint of Hyperkalemia (01 Apr 2018 09:18)      SUBJECTIVE / OVERNIGHT EVENTS: Pt was seen and examined at 11:35am, early am events noted ( at 830am, pt c/o having left eye pain, headache and blurry vision was seeing like a white film per pt, urgent Ophthalmology consult was obtained, pt received diamox x1 dose and multiple eye drops has been started), eye exam in progress. Pt reports that she still has left eye pain and her vision is still blurry, no sob, chest pain, no other complaints. Opthalmology team present on the floor, monitoring and waiting for dialted eye exam.        MEDICATIONS  (STANDING):  acetazolamide    Tablet 500 milliGRAM(s) Oral two times a day  atorvastatin 20 milliGRAM(s) Oral at bedtime  brimonidine 0.2% Ophthalmic Solution 1 Drop(s) Left EYE three times a day  calcitriol   Capsule 0.25 MICROGram(s) Oral daily  calcium acetate 2001 milliGRAM(s) Oral three times a day with meals  dextrose 5%. 1000 milliLiter(s) (50 mL/Hr) IV Continuous <Continuous>  dextrose 50% Injectable 12.5 Gram(s) IV Push once  dextrose 50% Injectable 25 Gram(s) IV Push once  dextrose 50% Injectable 25 Gram(s) IV Push once  dorzolamide 2% Ophthalmic Solution 1 Drop(s) Left EYE three times a day  haloperidol     Tablet 1 milliGRAM(s) Oral at bedtime  heparin  Injectable 5000 Unit(s) SubCutaneous every 8 hours  insulin glargine Injectable (LANTUS) 18 Unit(s) SubCutaneous at bedtime  insulin lispro (HumaLOG) corrective regimen sliding scale   SubCutaneous three times a day before meals  insulin lispro (HumaLOG) corrective regimen sliding scale   SubCutaneous at bedtime  insulin lispro Injectable (HumaLOG) 15 Unit(s) SubCutaneous three times a day before meals  latanoprost 0.005% Ophthalmic Solution 1 Drop(s) Left EYE at bedtime  metoprolol tartrate 100 milliGRAM(s) Oral two times a day  NIFEdipine XL 30 milliGRAM(s) Oral daily  ofloxacin 0.3% Solution 1 Drop(s) Left EYE four times a day  OXcarbazepine 300 milliGRAM(s) Oral two times a day  pregabalin 100 milliGRAM(s) Oral two times a day  timolol 0.5% Solution 1 Drop(s) Left EYE two times a day    MEDICATIONS  (PRN):  acetaminophen   Tablet. 650 milliGRAM(s) Oral every 6 hours PRN mild, moderate pain  dextrose Gel 1 Dose(s) Oral once PRN Blood Glucose LESS THAN 70 milliGRAM(s)/deciliter  glucagon  Injectable 1 milliGRAM(s) IntraMuscular once PRN Glucose LESS THAN 70 milligrams/deciliter  haloperidol     Tablet 2 milliGRAM(s) Oral every 6 hours PRN Agitation  haloperidol    Injectable 2 milliGRAM(s) IV Push every 6 hours PRN Agitation  haloperidol    Injectable 2 milliGRAM(s) IntraMuscular every 6 hours PRN Agitation  oxyCODONE    IR 5 milliGRAM(s) Oral every 6 hours PRN Severe Pain (7 - 10)      Vital Signs Last 24 Hrs  T(C): 36.8 (03 Apr 2018 09:35), Max: 37.3 (02 Apr 2018 14:46)  T(F): 98.2 (03 Apr 2018 09:35), Max: 99.1 (02 Apr 2018 14:46)  HR: 84 (03 Apr 2018 10:52) (64 - 84)  BP: 171/94 (03 Apr 2018 10:52) (127/66 - 176/71)  BP(mean): --  RR: 16 (03 Apr 2018 10:52) (16 - 18)  SpO2: 100% (03 Apr 2018 10:52) (98% - 100%)  CAPILLARY BLOOD GLUCOSE      POCT Blood Glucose.: 235 mg/dL (03 Apr 2018 12:50)  POCT Blood Glucose.: 117 mg/dL (03 Apr 2018 08:40)  POCT Blood Glucose.: 138 mg/dL (03 Apr 2018 05:55)  POCT Blood Glucose.: 225 mg/dL (02 Apr 2018 22:41)  POCT Blood Glucose.: 150 mg/dL (02 Apr 2018 17:40)    I&O's Summary    03 Apr 2018 07:01  -  03 Apr 2018 13:23  --------------------------------------------------------  IN: 400 mL / OUT: 2400 mL / NET: -2000 mL        PHYSICAL EXAM:  GENERAL: NAD, well-developed  HEENT: eye exam in progress   CHEST/LUNG: Clear to auscultation bilaterally; No wheeze  HEART: Regular rate and rhythm  ABDOMEN: Soft, Nontender, Nondistended  EXTREMITIES:  left leg BKA, Rt LE no edema  PSYCH: calm  NEUROLOGY: AAOx3  SKIN: No rashes or lesions      LABS:                        10.2   9.36  )-----------( 207      ( 03 Apr 2018 06:35 )             31.0     04-03    135  |  89<L>  |  126<H>  ----------------------------<  138<H>  5.8<H>   |  21<L>  |  8.96<H>    Ca    7.3<L>      03 Apr 2018 06:35  Phos  8.6     04-03  Mg     2.5     04-03                RADIOLOGY & ADDITIONAL TESTS:    Imaging Personally Reviewed:    Consultant(s) Notes Reviewed:      Care Discussed with Consultants/Other Providers: Patient is a 37y old  Female who presents with a chief complaint of Hyperkalemia (01 Apr 2018 09:18)      SUBJECTIVE / OVERNIGHT EVENTS: Pt was seen and examined at 11:35am, early am events noted ( at 830am, pt c/o having left eye pain, headache and blurry vision was seeing like a white film per pt, urgent Ophthalmology consult was obtained, pt received diamox x1 dose and multiple eye drops has been started), eye exam in progress. Pt reports that she still has left eye pain and her vision is still blurry, no sob, chest pain, no other complaints. Opthalmology team present on the floor, monitoring and waiting for dialted eye exam.        MEDICATIONS  (STANDING):  acetazolamide    Tablet 500 milliGRAM(s) Oral two times a day  atorvastatin 20 milliGRAM(s) Oral at bedtime  brimonidine 0.2% Ophthalmic Solution 1 Drop(s) Left EYE three times a day  calcitriol   Capsule 0.25 MICROGram(s) Oral daily  calcium acetate 2001 milliGRAM(s) Oral three times a day with meals  dextrose 5%. 1000 milliLiter(s) (50 mL/Hr) IV Continuous <Continuous>  dextrose 50% Injectable 12.5 Gram(s) IV Push once  dextrose 50% Injectable 25 Gram(s) IV Push once  dextrose 50% Injectable 25 Gram(s) IV Push once  dorzolamide 2% Ophthalmic Solution 1 Drop(s) Left EYE three times a day  haloperidol     Tablet 1 milliGRAM(s) Oral at bedtime  heparin  Injectable 5000 Unit(s) SubCutaneous every 8 hours  insulin glargine Injectable (LANTUS) 18 Unit(s) SubCutaneous at bedtime  insulin lispro (HumaLOG) corrective regimen sliding scale   SubCutaneous three times a day before meals  insulin lispro (HumaLOG) corrective regimen sliding scale   SubCutaneous at bedtime  insulin lispro Injectable (HumaLOG) 15 Unit(s) SubCutaneous three times a day before meals  latanoprost 0.005% Ophthalmic Solution 1 Drop(s) Left EYE at bedtime  metoprolol tartrate 100 milliGRAM(s) Oral two times a day  NIFEdipine XL 30 milliGRAM(s) Oral daily  ofloxacin 0.3% Solution 1 Drop(s) Left EYE four times a day  OXcarbazepine 300 milliGRAM(s) Oral two times a day  pregabalin 100 milliGRAM(s) Oral two times a day  timolol 0.5% Solution 1 Drop(s) Left EYE two times a day    MEDICATIONS  (PRN):  acetaminophen   Tablet. 650 milliGRAM(s) Oral every 6 hours PRN mild, moderate pain  dextrose Gel 1 Dose(s) Oral once PRN Blood Glucose LESS THAN 70 milliGRAM(s)/deciliter  glucagon  Injectable 1 milliGRAM(s) IntraMuscular once PRN Glucose LESS THAN 70 milligrams/deciliter  haloperidol     Tablet 2 milliGRAM(s) Oral every 6 hours PRN Agitation  haloperidol    Injectable 2 milliGRAM(s) IV Push every 6 hours PRN Agitation  haloperidol    Injectable 2 milliGRAM(s) IntraMuscular every 6 hours PRN Agitation  oxyCODONE    IR 5 milliGRAM(s) Oral every 6 hours PRN Severe Pain (7 - 10)      Vital Signs Last 24 Hrs  T(C): 36.8 (03 Apr 2018 09:35), Max: 37.3 (02 Apr 2018 14:46)  T(F): 98.2 (03 Apr 2018 09:35), Max: 99.1 (02 Apr 2018 14:46)  HR: 84 (03 Apr 2018 10:52) (64 - 84)  BP: 171/94 (03 Apr 2018 10:52) (127/66 - 176/71)  BP(mean): --  RR: 16 (03 Apr 2018 10:52) (16 - 18)  SpO2: 100% (03 Apr 2018 10:52) (98% - 100%)  CAPILLARY BLOOD GLUCOSE      POCT Blood Glucose.: 235 mg/dL (03 Apr 2018 12:50)  POCT Blood Glucose.: 117 mg/dL (03 Apr 2018 08:40)  POCT Blood Glucose.: 138 mg/dL (03 Apr 2018 05:55)  POCT Blood Glucose.: 225 mg/dL (02 Apr 2018 22:41)  POCT Blood Glucose.: 150 mg/dL (02 Apr 2018 17:40)    I&O's Summary    03 Apr 2018 07:01  -  03 Apr 2018 13:23  --------------------------------------------------------  IN: 400 mL / OUT: 2400 mL / NET: -2000 mL        PHYSICAL EXAM:  GENERAL: NAD, well-developed  HEENT: eye exam in progress by opthalmology team  CHEST/LUNG: Clear to auscultation bilaterally; No wheeze  HEART: Regular rate and rhythm  ABDOMEN: Soft, Nontender, Nondistended  EXTREMITIES:  left leg BKA, Rt LE no edema  PSYCH: calm  NEUROLOGY: AAOx3  SKIN: No rashes or lesions      LABS:                        10.2   9.36  )-----------( 207      ( 03 Apr 2018 06:35 )             31.0     04-03    135  |  89<L>  |  126<H>  ----------------------------<  138<H>  5.8<H>   |  21<L>  |  8.96<H>    Ca    7.3<L>      03 Apr 2018 06:35  Phos  8.6     04-03  Mg     2.5     04-03                RADIOLOGY & ADDITIONAL TESTS:    Imaging Personally Reviewed:    Consultant(s) Notes Reviewed:      Care Discussed with Consultants/Other Providers:

## 2018-04-03 NOTE — PROGRESS NOTE ADULT - PROBLEM SELECTOR PLAN 3
- Hyperkalemia, kayexalate 1 dose today  - Will need SW evaluation re: transportation issues to and from HD   - As per chart review, pt is known to be noncompliant with dietary intake as well, will have dietician meet with patient - Hyperkalemia, had dialysis today, will discuss with nephrology, pt counselled on diet to avoid potassium rich foods  - Will need SW evaluation re: transportation issues to and from HD   - As per chart review, pt is known to be noncompliant with dietary intake as well, will have dietician meet with patient - Hyperkalemia, had dialysis today, will discuss with nephrology, pt counselled on diet to avoid potassium rich foods  - Will need SW evaluation re: transportation issues to and from HD   - As per chart review, pt is known to be noncompliant with dietary intake as well, will have dietician meet with patient  will discuss with nephrology if further dose of diamox is suggested by Opthalmology due to side effect of hypokalemia

## 2018-04-03 NOTE — CONSULT NOTE ADULT - ATTENDING COMMENTS
I have interviewed and examined the patient and reviewed the residents note including the history, exam, assessment, and plan.  I agree with the residents assessment and plan.    37 F h/o HTN, DM and ESRD on HD  p/w diaphoresis and vomiting found to be hyperkalemic, also monocular 2/2 glaucoma (?neovascular) OD with h/o diabetic retinopathy OS s/p injections and laser (sees Dr. 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  Pt has no NVI/NVA to suggest NVG.  - IOP 71, no NVA, open angles, with cells in the ac (difficult to say if white or tan), and chronic VH  - brimonidine, latanoprost, timolol and dorzolamide x2 --> IOP down to 61 after gtts   - diamox 500mg IV x1 - IOP unchanged  - IOP not responding to diamox so paracentesis brought down to 28,26,25 OS - c/w Oflox QID OS x 4 days  - patient needs urgent vitrectomy and glaucoma tube surgery,   - D/w Dr. Zafar (retina) and Dr. Lombardo (glaucoma) - will do surgery at [place] at 7 am on 4/4  - Needs medical clearance, NPO after midnight, transfer to be at [place] by 6am tomorrow   - c/w brimonidine, timolol and dorzolamide BID and latanoprost QHS   - Diamox sequels 500 mg PO tonight and tomorrow am.  Ok'ed by primary team and nephrology to give     ***Addendum*** IOP 17 at 10pm, FBS likely due to betadine.  Can give PF artificial tears [id number] OS, but wait 5-10 minutes between putting in each drop    Follow-Up:  Plan for follow up per post-op instructions    Katlyn Murray MD

## 2018-04-03 NOTE — PROGRESS NOTE ADULT - PROBLEM SELECTOR PLAN 2
- Now resolved, pt's mental status back to baseline  - Pt admits to consuming extra doses of Lyrica prior to admission because she ran out of Oxycodone  -  pain management consult to assist with weaning off opiates entirely  - Will continue to hold Lyrica   - Pt normally takes Oxycodone 30mg q4 hrs; she is heavily dependent for 3 years now 2/2 chronic back pain. ISTOP reference # [id number]  - Cont Oxycodone 5mg q8 hours prn for now  - No indication for CT head at this point; if acute change in mental status, will revisit CT head  - Psych following; strongly suspect a degree of depression - pt's daughter currently at [facility]'s ICU s/p open heart surgery due to complications of an infected valve. - Now resolved, pt's mental status back to baseline  - Pt admits to consuming extra doses of Lyrica prior to admission because she ran out of Oxycodone  -  pain management consult appreciated, to assist with weaning off opiates entirely  - Will continue to hold Lyrica   - Pt normally takes Oxycodone 30mg q4 hrs; she is heavily dependent for 3 years now 2/2 chronic back pain. ISTOP reference # [id number]  - Cont Oxycodone 5mg q8 hours prn for now  - No indication for CT head at this point; if acute change in mental status, will revisit CT head  - Psych following; strongly suspect a degree of depression - pt's daughter currently at [facility]'s ICU s/p open heart surgery due to complications of an infected valve. - Now resolved, pt's mental status back to baseline  - Pt admits to consuming extra doses of Lyrica prior to admission because she ran out of Oxycodone  -  pain management consult appreciated, to assist with weaning off opiates entirely  - Per pain management recs restarted  Lyrica home dose on 4/2  - Pt normally takes Oxycodone 30mg q4 hrs; she is heavily dependent for 3 years now 2/2 chronic back pain. ISTOP reference # [id number]  - Cont Oxycodone 5mg q8 hours prn for now  - No indication for CT head at this point; if acute change in mental status, will revisit CT head  - Psych following; strongly suspect a degree of depression - pt's daughter currently at [facility]'s ICU s/p open heart surgery due to complications of an infected valve.

## 2018-04-04 LAB
BUN SERPL-MCNC: 101 MG/DL — HIGH (ref 7–23)
BUN SERPL-MCNC: 111 MG/DL — HIGH (ref 7–23)
BUN SERPL-MCNC: 20 MG/DL — SIGNIFICANT CHANGE UP (ref 7–23)
BUN SERPL-MCNC: 90 MG/DL — HIGH (ref 7–23)
BUN SERPL-MCNC: 93 MG/DL — HIGH (ref 7–23)
CALCIUM SERPL-MCNC: 7.7 MG/DL — LOW (ref 8.4–10.5)
CALCIUM SERPL-MCNC: 7.8 MG/DL — LOW (ref 8.4–10.5)
CHLORIDE SERPL-SCNC: 89 MMOL/L — LOW (ref 98–107)
CHLORIDE SERPL-SCNC: 90 MMOL/L — LOW (ref 98–107)
CO2 SERPL-SCNC: 24 MMOL/L — SIGNIFICANT CHANGE UP (ref 22–31)
CO2 SERPL-SCNC: 24 MMOL/L — SIGNIFICANT CHANGE UP (ref 22–31)
CREAT SERPL-MCNC: 2.52 MG/DL — HIGH (ref 0.5–1.3)
CREAT SERPL-MCNC: 7.35 MG/DL — HIGH (ref 0.5–1.3)
CREAT SERPL-MCNC: 7.44 MG/DL — HIGH (ref 0.5–1.3)
CREAT SERPL-MCNC: 7.8 MG/DL — HIGH (ref 0.5–1.3)
CREAT SERPL-MCNC: 8.09 MG/DL — HIGH (ref 0.5–1.3)
GLUCOSE SERPL-MCNC: 135 MG/DL — HIGH (ref 70–99)
GLUCOSE SERPL-MCNC: 147 MG/DL — HIGH (ref 70–99)
HCT VFR BLD CALC: 33.9 % — LOW (ref 34.5–45)
HCT VFR BLD CALC: 35.1 % — SIGNIFICANT CHANGE UP (ref 34.5–45)
HGB BLD-MCNC: 10.9 G/DL — LOW (ref 11.5–15.5)
HGB BLD-MCNC: 11.3 G/DL — LOW (ref 11.5–15.5)
INR BLD: 1.16 — SIGNIFICANT CHANGE UP (ref 0.88–1.17)
MAGNESIUM SERPL-MCNC: 2.5 MG/DL — SIGNIFICANT CHANGE UP (ref 1.6–2.6)
MAGNESIUM SERPL-MCNC: 2.6 MG/DL — SIGNIFICANT CHANGE UP (ref 1.6–2.6)
MCHC RBC-ENTMCNC: 29 PG — SIGNIFICANT CHANGE UP (ref 27–34)
MCHC RBC-ENTMCNC: 29.1 PG — SIGNIFICANT CHANGE UP (ref 27–34)
MCHC RBC-ENTMCNC: 32.2 % — SIGNIFICANT CHANGE UP (ref 32–36)
MCHC RBC-ENTMCNC: 32.2 % — SIGNIFICANT CHANGE UP (ref 32–36)
MCV RBC AUTO: 90.2 FL — SIGNIFICANT CHANGE UP (ref 80–100)
MCV RBC AUTO: 90.4 FL — SIGNIFICANT CHANGE UP (ref 80–100)
NRBC # FLD: 0 — SIGNIFICANT CHANGE UP
NRBC # FLD: 0 — SIGNIFICANT CHANGE UP
PHOSPHATE SERPL-MCNC: 7.9 MG/DL — HIGH (ref 2.5–4.5)
PHOSPHATE SERPL-MCNC: 8.2 MG/DL — HIGH (ref 2.5–4.5)
PLATELET # BLD AUTO: 241 K/UL — SIGNIFICANT CHANGE UP (ref 150–400)
PLATELET # BLD AUTO: 244 K/UL — SIGNIFICANT CHANGE UP (ref 150–400)
PMV BLD: 10.7 FL — SIGNIFICANT CHANGE UP (ref 7–13)
PMV BLD: 10.8 FL — SIGNIFICANT CHANGE UP (ref 7–13)
POTASSIUM SERPL-MCNC: 4.7 MMOL/L — SIGNIFICANT CHANGE UP (ref 3.5–5.3)
POTASSIUM SERPL-MCNC: 5.8 MMOL/L — HIGH (ref 3.5–5.3)
POTASSIUM SERPL-MCNC: 6.1 MMOL/L — HIGH (ref 3.5–5.3)
POTASSIUM SERPL-SCNC: 4.7 MMOL/L — SIGNIFICANT CHANGE UP (ref 3.5–5.3)
POTASSIUM SERPL-SCNC: 5.8 MMOL/L — HIGH (ref 3.5–5.3)
POTASSIUM SERPL-SCNC: 6.1 MMOL/L — HIGH (ref 3.5–5.3)
PROTHROM AB SERPL-ACNC: 12.9 SEC — SIGNIFICANT CHANGE UP (ref 9.8–13.1)
RBC # BLD: 3.75 M/UL — LOW (ref 3.8–5.2)
RBC # BLD: 3.89 M/UL — SIGNIFICANT CHANGE UP (ref 3.8–5.2)
RBC # FLD: 13.5 % — SIGNIFICANT CHANGE UP (ref 10.3–14.5)
RBC # FLD: 13.6 % — SIGNIFICANT CHANGE UP (ref 10.3–14.5)
SODIUM SERPL-SCNC: 135 MMOL/L — SIGNIFICANT CHANGE UP (ref 135–145)
SODIUM SERPL-SCNC: 136 MMOL/L — SIGNIFICANT CHANGE UP (ref 135–145)
WBC # BLD: 8.49 K/UL — SIGNIFICANT CHANGE UP (ref 3.8–10.5)
WBC # BLD: 9.38 K/UL — SIGNIFICANT CHANGE UP (ref 3.8–10.5)
WBC # FLD AUTO: 8.49 K/UL — SIGNIFICANT CHANGE UP (ref 3.8–10.5)
WBC # FLD AUTO: 9.38 K/UL — SIGNIFICANT CHANGE UP (ref 3.8–10.5)

## 2018-04-04 PROCEDURE — 99233 SBSQ HOSP IP/OBS HIGH 50: CPT

## 2018-04-04 PROCEDURE — 90935 HEMODIALYSIS ONE EVALUATION: CPT

## 2018-04-04 RX ORDER — HYDROMORPHONE HYDROCHLORIDE 2 MG/ML
1 INJECTION INTRAMUSCULAR; INTRAVENOUS; SUBCUTANEOUS ONCE
Refills: 0 | Status: DISCONTINUED | OUTPATIENT
Start: 2018-04-04 | End: 2018-04-04

## 2018-04-04 RX ORDER — INSULIN LISPRO 100/ML
15 VIAL (ML) SUBCUTANEOUS
Refills: 0 | Status: DISCONTINUED | OUTPATIENT
Start: 2018-04-04 | End: 2018-04-10

## 2018-04-04 RX ORDER — ACETAZOLAMIDE 250 MG/1
500 TABLET ORAL
Refills: 0 | Status: DISCONTINUED | OUTPATIENT
Start: 2018-04-04 | End: 2018-04-05

## 2018-04-04 RX ADMIN — Medication 2001 MILLIGRAM(S): at 18:55

## 2018-04-04 RX ADMIN — HALOPERIDOL DECANOATE 2 MILLIGRAM(S): 100 INJECTION INTRAMUSCULAR at 08:57

## 2018-04-04 RX ADMIN — HYDROMORPHONE HYDROCHLORIDE 1 MILLIGRAM(S): 2 INJECTION INTRAMUSCULAR; INTRAVENOUS; SUBCUTANEOUS at 10:19

## 2018-04-04 RX ADMIN — Medication 100 MILLIGRAM(S): at 19:01

## 2018-04-04 RX ADMIN — OXYCODONE HYDROCHLORIDE 5 MILLIGRAM(S): 5 TABLET ORAL at 19:30

## 2018-04-04 RX ADMIN — Medication 15 UNIT(S): at 18:55

## 2018-04-04 RX ADMIN — Medication 1 DROP(S): at 22:50

## 2018-04-04 RX ADMIN — OXYCODONE HYDROCHLORIDE 5 MILLIGRAM(S): 5 TABLET ORAL at 18:56

## 2018-04-04 RX ADMIN — Medication 100 MILLIGRAM(S): at 18:55

## 2018-04-04 RX ADMIN — OXYCODONE HYDROCHLORIDE 5 MILLIGRAM(S): 5 TABLET ORAL at 12:30

## 2018-04-04 RX ADMIN — Medication 1 DROP(S): at 05:23

## 2018-04-04 RX ADMIN — OXYCODONE HYDROCHLORIDE 5 MILLIGRAM(S): 5 TABLET ORAL at 05:56

## 2018-04-04 RX ADMIN — DORZOLAMIDE HYDROCHLORIDE 1 DROP(S): 20 SOLUTION/ DROPS OPHTHALMIC at 14:21

## 2018-04-04 RX ADMIN — Medication 650 MILLIGRAM(S): at 10:22

## 2018-04-04 RX ADMIN — Medication 650 MILLIGRAM(S): at 09:49

## 2018-04-04 RX ADMIN — Medication 1 DROP(S): at 14:21

## 2018-04-04 RX ADMIN — OXCARBAZEPINE 300 MILLIGRAM(S): 300 TABLET, FILM COATED ORAL at 18:55

## 2018-04-04 RX ADMIN — Medication 1 DROP(S): at 05:26

## 2018-04-04 RX ADMIN — Medication 1 DROP(S): at 18:56

## 2018-04-04 RX ADMIN — Medication 100 MILLIGRAM(S): at 05:31

## 2018-04-04 RX ADMIN — ACETAZOLAMIDE 500 MILLIGRAM(S): 250 TABLET ORAL at 05:25

## 2018-04-04 RX ADMIN — OXYCODONE HYDROCHLORIDE 5 MILLIGRAM(S): 5 TABLET ORAL at 11:43

## 2018-04-04 RX ADMIN — DORZOLAMIDE HYDROCHLORIDE 1 DROP(S): 20 SOLUTION/ DROPS OPHTHALMIC at 22:50

## 2018-04-04 RX ADMIN — BRIMONIDINE TARTRATE 1 DROP(S): 2 SOLUTION/ DROPS OPHTHALMIC at 22:47

## 2018-04-04 RX ADMIN — BRIMONIDINE TARTRATE 1 DROP(S): 2 SOLUTION/ DROPS OPHTHALMIC at 05:23

## 2018-04-04 RX ADMIN — OXYCODONE HYDROCHLORIDE 5 MILLIGRAM(S): 5 TABLET ORAL at 00:00

## 2018-04-04 RX ADMIN — OXCARBAZEPINE 300 MILLIGRAM(S): 300 TABLET, FILM COATED ORAL at 05:23

## 2018-04-04 RX ADMIN — OXYCODONE HYDROCHLORIDE 5 MILLIGRAM(S): 5 TABLET ORAL at 01:15

## 2018-04-04 RX ADMIN — LATANOPROST 1 DROP(S): 0.05 SOLUTION/ DROPS OPHTHALMIC; TOPICAL at 22:50

## 2018-04-04 RX ADMIN — ATORVASTATIN CALCIUM 20 MILLIGRAM(S): 80 TABLET, FILM COATED ORAL at 22:47

## 2018-04-04 RX ADMIN — HYDROMORPHONE HYDROCHLORIDE 1 MILLIGRAM(S): 2 INJECTION INTRAMUSCULAR; INTRAVENOUS; SUBCUTANEOUS at 10:35

## 2018-04-04 RX ADMIN — Medication 15 UNIT(S): at 14:21

## 2018-04-04 RX ADMIN — Medication 2: at 14:20

## 2018-04-04 RX ADMIN — BRIMONIDINE TARTRATE 1 DROP(S): 2 SOLUTION/ DROPS OPHTHALMIC at 14:21

## 2018-04-04 RX ADMIN — OXYCODONE HYDROCHLORIDE 5 MILLIGRAM(S): 5 TABLET ORAL at 00:21

## 2018-04-04 RX ADMIN — ACETAZOLAMIDE 500 MILLIGRAM(S): 250 TABLET ORAL at 20:13

## 2018-04-04 RX ADMIN — DORZOLAMIDE HYDROCHLORIDE 1 DROP(S): 20 SOLUTION/ DROPS OPHTHALMIC at 05:24

## 2018-04-04 NOTE — PROGRESS NOTE ADULT - SUBJECTIVE AND OBJECTIVE BOX
[facility] Ophthalmology Progress Note    S: Pt seen and examined in dialysis, reports pain is improved and no subjective visual changes.     ROS:  General (neg), Vision (per HPI), Head and Neck (neg), Pulm (neg), CV (neg), GI (neg),  (neg), Musculoskeletal (neg), Skin/Integ (neg), Neuro (neg), Endocrine (neg), Heme (neg), All/Immuno (neg)    Mood and Affect Appropriate ( x ),  Oriented to Time, Place, and Person x 3 ( x )    Ophthalmology Exam    Visual acuity NLP OD, 20/800  Pupils: PERRL OU, no APD  Ttono: 35 OD, 18 OS  Extraocular movements (EOMs): Full OU, no pain, no diplopia    Pen light exam (PLE)  External:  Flat OU  Lids/Lashes/Lacrimal Ducts: Flat OU    Sclera/Conjunctiva:  W+Q OD, tube shunt superotemporally, W&Q OS  Cornea: 2+ MCE OD, 1+MCE  Anterior Chamber: superotemporal tube abutting iris   Iris:  Flat OU, no NVI  Lens:  NS OU    A/P   37 F h/o HTN, DM and ESRD on HD  p/w diaphoresis and vomiting found to be hyperkalemic, 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  [place] [address] [phone]

## 2018-04-04 NOTE — PROGRESS NOTE ADULT - ATTENDING COMMENTS
Addendum:  240pm, per nsg got a call from the transfer center that sx is postponed and pt was fed and was given insulin, will discuss with transfer center and opthalomology, discussed with transfer center. Addendum:  240pm, per nsg got a call from the transfer center that sx is postponed and pt was fed and was given insulin, without notifying the primary team, will discuss with transfer center and opthalomology, discussed with transfer center.

## 2018-04-04 NOTE — CHART NOTE - NSCHARTNOTEFT_GEN_A_CORE
4/4/2018 (3:15 PM) I was called by transfer center who notified me that anesthesia cancelled the surgery for this afternoon due to concerns of electrolyte abnormality and would like to reassess the risk/benefits tomorrow after HD.  Considering the patient's IOP has been stable at 17-18 since AC paracentesis yesterday there is less urgency to do surgery in the setting of a medically unstable patient.  The effects of the paracentesis have likely worn off and the IOP is now better controlled with drops and PO diamox.  - please continue diamox 500mg PO BID (I spoke with nephrology who has no objections to this dose)   - continue all eye drops   - patient may need another intravitreal injection of avastin (last given 2/26/18) to reduce the chance of new intravitreal hemorrhage   - vision decreased from vitreous hemorrhage and not significantly down from baseline (20/400 in office on 2/26)  - will reassess IOP tomorrow morning  - D/w Dr. Mina Singh (Retina attending)     Estevan Jean MD  PGY-3 4/4/2018 (3:15 PM) I was called by transfer center who notified me that anesthesia cancelled the surgery for this afternoon due to concerns of electrolyte abnormality and would like to reassess the risk/benefits tomorrow after HD.  Considering the patient's IOP has been stable at 17-18 since AC paracentesis yesterday there is less urgency to do surgery in the setting of a medically unstable patient.  The effects of the paracentesis have likely worn off and the IOP is now better controlled with drops and PO diamox.  - please continue diamox 500mg PO BID (I spoke with nephrology who has no objections to this dose)   - continue all eye drops   - patient may need another intravitreal injection of avastin (last given 2/26/18) to reduce the chance of new intravitreal hemorrhage   - vision decreased from vitreous hemorrhage and not significantly down from baseline (20/400 in office on 2/26, now 20/400-20/800 on near card)  - will reassess IOP tomorrow morning  - D/w Dr. Mina Singh (Retina attending)     Estevan Jean MD  PGY-3 4/4/2018 (3:15 PM) I was called by transfer center who notified me that anesthesia cancelled the surgery for this afternoon due to concerns of electrolyte abnormality and would like to reassess the risk/benefits tomorrow after HD.  Considering the patient's IOP has been stable at 17-18 since AC paracentesis yesterday there is less urgency to do surgery in the setting of a medically unstable patient.  The effects of the paracentesis have likely worn off and the IOP is now better controlled with drops and PO diamox.    Pt seen and examined at bedside, pain is improved     VA 20/400 OS  IOP 15 OS     - please continue diamox 500mg PO BID (I spoke with nephrology who has no objections to this dose)   - continue all eye drops   - patient may need another intravitreal injection of avastin (last given 2/26/18) to reduce the chance of new intravitreal hemorrhage   - vision decreased from vitreous hemorrhage and not significantly down from baseline (20/400 in office on 2/26, now 20/400 on near card)  - will reassess IOP tomorrow morning  - D/w Dr. Mina Singh (Retina attending)     Estevan Jean MD  PGY-3

## 2018-04-04 NOTE — PROGRESS NOTE ADULT - ATTENDING COMMENTS
Seen for ESRD and HD. She is receiving extra HD today due to hyperkalemia. Patient seen and examined while on HD, tolerating it well. BP 120s-140s systolic. Complaining of diffuse pain, asking of dilaudid. Recirc study negative. Hyperkalemia likely secondary to dietary indiscretion. Anticipate next routine HD tomorrow, 4/5.

## 2018-04-04 NOTE — PROGRESS NOTE ADULT - SUBJECTIVE AND OBJECTIVE BOX
K-- 6.1, Creat-- 7.80, Mg-- 2.5, Phos-- 7.9,   discussed with Asia Hinojosa (nephrology fellow), as per discussion, pt will go for HD now, pt will be transferred to the [facility] for surgery afterward,   >> cont monitoring,   >> discussed with RN, lab was sent at 4 am,  >> discussed with lab, supervisor Serjio,  >> as per discussion, there was a problem with analyzer,   >> the results were given to RN at 05:25 am,   >>  K-- 6.1, Creat-- 7.80, Mg-- 2.5, Phos-- 7.9,   discussed with Asia Hinojosa (nephrology fellow), as per discussion, pt will go for HD now, pt will be transferred to the [facility] for surgery afterward,   >> cont monitoring,   >> discussed with RN,

## 2018-04-04 NOTE — PROGRESS NOTE ADULT - SUBJECTIVE AND OBJECTIVE BOX
Patient is a 37y old  Female who presents with a chief complaint of Hyperkalemia (01 Apr 2018 09:18)      SUBJECTIVE / OVERNIGHT EVENTS: Pt was seen and examined at 12:05pm, early am events noted ( hyperkalemia, sx is delayed due to that, had dialysis today) Pt reports that her eye pain is somewhat better today, awaiting for sx later today at [facility], no other new issues reported.      MEDICATIONS  (STANDING):  artificial  tears Solution 1 Drop(s) Left EYE three times a day  atorvastatin 20 milliGRAM(s) Oral at bedtime  brimonidine 0.2% Ophthalmic Solution 1 Drop(s) Left EYE three times a day  calcitriol   Capsule 0.25 MICROGram(s) Oral daily  calcium acetate 2001 milliGRAM(s) Oral three times a day with meals  dextrose 5%. 1000 milliLiter(s) (50 mL/Hr) IV Continuous <Continuous>  dextrose 50% Injectable 12.5 Gram(s) IV Push once  dextrose 50% Injectable 25 Gram(s) IV Push once  dextrose 50% Injectable 25 Gram(s) IV Push once  dorzolamide 2% Ophthalmic Solution 1 Drop(s) Left EYE three times a day  haloperidol     Tablet 1 milliGRAM(s) Oral at bedtime  insulin glargine Injectable (LANTUS) 9 Unit(s) SubCutaneous at bedtime  insulin lispro (HumaLOG) corrective regimen sliding scale   SubCutaneous three times a day before meals  insulin lispro (HumaLOG) corrective regimen sliding scale   SubCutaneous at bedtime  insulin lispro Injectable (HumaLOG) 15 Unit(s) SubCutaneous three times a day before meals  latanoprost 0.005% Ophthalmic Solution 1 Drop(s) Left EYE at bedtime  metoprolol tartrate 100 milliGRAM(s) Oral two times a day  NIFEdipine XL 30 milliGRAM(s) Oral daily  OXcarbazepine 300 milliGRAM(s) Oral two times a day  pregabalin 100 milliGRAM(s) Oral two times a day  timolol 0.5% Solution 1 Drop(s) Left EYE two times a day    MEDICATIONS  (PRN):  acetaminophen   Tablet. 650 milliGRAM(s) Oral every 6 hours PRN mild, moderate pain  dextrose Gel 1 Dose(s) Oral once PRN Blood Glucose LESS THAN 70 milliGRAM(s)/deciliter  glucagon  Injectable 1 milliGRAM(s) IntraMuscular once PRN Glucose LESS THAN 70 milligrams/deciliter  haloperidol     Tablet 2 milliGRAM(s) Oral every 6 hours PRN Agitation  haloperidol    Injectable 2 milliGRAM(s) IV Push every 6 hours PRN Agitation  haloperidol    Injectable 2 milliGRAM(s) IntraMuscular every 6 hours PRN Agitation  oxyCODONE    IR 5 milliGRAM(s) Oral every 6 hours PRN Severe Pain (7 - 10)      Vital Signs Last 24 Hrs  T(C): 36.4 (04 Apr 2018 11:24), Max: 36.9 (04 Apr 2018 05:05)  T(F): 97.6 (04 Apr 2018 11:24), Max: 98.5 (04 Apr 2018 05:05)  HR: 66 (04 Apr 2018 11:24) (63 - 84)  BP: 158/94 (04 Apr 2018 11:24) (132/68 - 159/98)  BP(mean): --  RR: 18 (04 Apr 2018 11:24) (16 - 18)  SpO2: 100% (04 Apr 2018 11:24) (99% - 100%)  CAPILLARY BLOOD GLUCOSE      POCT Blood Glucose.: 172 mg/dL (04 Apr 2018 13:55)  POCT Blood Glucose.: 171 mg/dL (04 Apr 2018 12:12)  POCT Blood Glucose.: 137 mg/dL (04 Apr 2018 10:32)  POCT Blood Glucose.: 119 mg/dL (04 Apr 2018 09:03)  POCT Blood Glucose.: 151 mg/dL (04 Apr 2018 05:45)  POCT Blood Glucose.: 140 mg/dL (03 Apr 2018 21:27)  POCT Blood Glucose.: 172 mg/dL (03 Apr 2018 17:35)    I&O's Summary    03 Apr 2018 07:01  -  04 Apr 2018 07:00  --------------------------------------------------------  IN: 400 mL / OUT: 2400 mL / NET: -2000 mL    04 Apr 2018 07:01  -  04 Apr 2018 14:13  --------------------------------------------------------  IN: 900 mL / OUT: 2100 mL / NET: -1200 mL        PHYSICAL EXAM:  GENERAL: NAD, well-developed  CHEST/LUNG: Clear to auscultation bilaterally; No wheeze  HEART: Regular rate and rhythm  ABDOMEN: Soft, Nontender, Nondistended  EXTREMITIES:  left leg BKA, Rt LE no edema  PSYCH: calm  NEUROLOGY: AAOx3      LABS:                        11.3   8.49  )-----------( 244      ( 04 Apr 2018 13:30 )             35.1     04-04    135  |  90<L>  |  111<H>  ----------------------------<  147<H>  5.8<H>   |  24  |  8.09<H>    Ca    7.7<L>      04 Apr 2018 07:31  Phos  8.2     04-04  Mg     2.6     04-04      PT/INR - ( 04 Apr 2018 04:20 )   PT: 12.9 SEC;   INR: 1.16                    RADIOLOGY & ADDITIONAL TESTS:    Imaging Personally Reviewed:    Consultant(s) Notes Reviewed:      Care Discussed with Consultants/Other Providers:

## 2018-04-04 NOTE — PROGRESS NOTE ADULT - PROBLEM SELECTOR PLAN 1
Last Hd 4/3/18 2L removed. Patient seen on HD today again because she was hyperkalemic got HD for 3.5 hours.  BP stable during HD rounds.    Patient pulled out her AVF IV access during HD lost approximately 100cc of blood patient then continued HD w/o issue. AVF functioning well.   recirc study 4% (4/4/18)  Monitor BP and labs. Low salt and low potassium diet advised

## 2018-04-04 NOTE — PROGRESS NOTE ADULT - SUBJECTIVE AND OBJECTIVE BOX
called transportation center for [facility], spoke with Nubia at [phone],   >> spoke with Dr Henao-- opthalmology resident with Dr Zafar at [facility],  >> notified them about need for urgent HD, pt will require 3 hrs of HD, labs will be done 1 hr into HD session, and transportation center will be called with the chem 7 and electrolytes results once pt is ready to be transported,   >> [facility] HD center sent for pt to come for HD,   >> will sign out to a day team,

## 2018-04-04 NOTE — PROGRESS NOTE ADULT - PROBLEM SELECTOR PLAN 1
Folld by opthalmology,s/p diamox, cont eye drops, discussed with Ophthalmology  awaiting sx later today once K is normalized, pt may proceed for sx if K is normal will inform transfer center as well.

## 2018-04-04 NOTE — PROGRESS NOTE ADULT - SUBJECTIVE AND OBJECTIVE BOX
Discussed with HD center, charge RN, Shantelle, and RN, Ana Luisa CHÁVEZ,   >> labs to be sent 1 hour after HD was started,   Discussed with [facility] Nubia at [phone] and Dr Valladares ( [phone]).  >> called nephrology, as per discussion with Asia Jordan, the HD session will be shorten to 2 hrs;  >> discussed with Dr Palencia,    >> discussed with HD Center, RN Ana Luisa Chávez;  >> as per discussion, the repeat BMP was already to the lab,  >> signed out to zari Gillis,   >> discussed with EDIN Yeager,   >> called Nubia at the [facility] center -- [phone] and updated her on the pt's status.

## 2018-04-04 NOTE — PROGRESS NOTE ADULT - PROBLEM SELECTOR PLAN 2
- Now resolved, pt's mental status back to baseline  - Pt admits to consuming extra doses of Lyrica prior to admission because she ran out of Oxycodone  -  pain management consult appreciated, to assist with weaning off opiates entirely  - Per pain management recs restarted  Lyrica home dose on 4/2  - Pt normally takes Oxycodone 30mg q4 hrs; she is heavily dependent for 3 years now 2/2 chronic back pain. ISTOP reference # [id number]  - Cont Oxycodone 5mg q8 hours prn for now  - No indication for CT head at this point; if acute change in mental status, will revisit CT head  - Psych following; strongly suspect a degree of depression - pt's daughter currently at [facility] ICU s/p open heart surgery due to complications of an infected valve.

## 2018-04-04 NOTE — PROGRESS NOTE ADULT - PROBLEM SELECTOR PLAN 3
- Hyperkalemia, had dialysis today, pt counselled on diet to avoid potassium rich foods  - Will need SW evaluation re: transportation issues to and from HD   - As per chart review, pt is known to be noncompliant with dietary intake as well, will have dietician meet with patient

## 2018-04-04 NOTE — PROGRESS NOTE ADULT - SUBJECTIVE AND OBJECTIVE BOX
[facility] DIVISION OF KIDNEY DISEASES AND HYPERTENSION -- HEMODIALYSIS NOTE  --------------------------------------------------------------------------------  Dayron Miahim     --------------------------------------------------------------------------------  Chief Complaint: ESRD/Ongoing hemodialysis requirement    24 hour events/subjective:    patient seen on HD. Had recirc study 4%. pulled of dialysis IV line lost 100 cc of blood.     PAST HISTORY  --------------------------------------------------------------------------------  No significant changes to PMH, PSH, FHx, SHx, unless otherwise noted    ALLERGIES & MEDICATIONS  --------------------------------------------------------------------------------  Allergies    No Known Allergies    Intolerances      Standing Inpatient Medications  artificial  tears Solution 1 Drop(s) Left EYE three times a day  atorvastatin 20 milliGRAM(s) Oral at bedtime  brimonidine 0.2% Ophthalmic Solution 1 Drop(s) Left EYE three times a day  calcitriol   Capsule 0.25 MICROGram(s) Oral daily  calcium acetate 2001 milliGRAM(s) Oral three times a day with meals  dextrose 5%. 1000 milliLiter(s) IV Continuous <Continuous>  dextrose 50% Injectable 12.5 Gram(s) IV Push once  dextrose 50% Injectable 25 Gram(s) IV Push once  dextrose 50% Injectable 25 Gram(s) IV Push once  dorzolamide 2% Ophthalmic Solution 1 Drop(s) Left EYE three times a day  haloperidol     Tablet 1 milliGRAM(s) Oral at bedtime  insulin glargine Injectable (LANTUS) 9 Unit(s) SubCutaneous at bedtime  insulin lispro (HumaLOG) corrective regimen sliding scale   SubCutaneous three times a day before meals  insulin lispro (HumaLOG) corrective regimen sliding scale   SubCutaneous at bedtime  insulin lispro Injectable (HumaLOG) 15 Unit(s) SubCutaneous three times a day before meals  latanoprost 0.005% Ophthalmic Solution 1 Drop(s) Left EYE at bedtime  metoprolol tartrate 100 milliGRAM(s) Oral two times a day  NIFEdipine XL 30 milliGRAM(s) Oral daily  OXcarbazepine 300 milliGRAM(s) Oral two times a day  pregabalin 100 milliGRAM(s) Oral two times a day  timolol 0.5% Solution 1 Drop(s) Left EYE two times a day    PRN Inpatient Medications  acetaminophen   Tablet. 650 milliGRAM(s) Oral every 6 hours PRN  dextrose Gel 1 Dose(s) Oral once PRN  glucagon  Injectable 1 milliGRAM(s) IntraMuscular once PRN  haloperidol     Tablet 2 milliGRAM(s) Oral every 6 hours PRN  haloperidol    Injectable 2 milliGRAM(s) IV Push every 6 hours PRN  haloperidol    Injectable 2 milliGRAM(s) IntraMuscular every 6 hours PRN  oxyCODONE    IR 5 milliGRAM(s) Oral every 6 hours PRN      REVIEW OF SYSTEMS  --------------------------------------------------------------------------------  Constitutional: [ ] Fever [ ] Chills [ ] Fatigue [ ] Weight change   HEENT: [ ] Blurred vision [ ] Eye Pain [ ] Headache [ ] Runny nose [ ] Sore Throat   Respiratory: [ ] Cough [ ] Wheezing [ ] Shortness of breath  Cardiovascular: [ ] Chest Pain [ ] Palpitations [ ] TORRES [ ] PND [ ] Orthopnea  Gastrointestinal: [ ] Abdominal Pain [ ] Diarrhea [ ] Constipation [ ] Hemorrhoids [ ] Nausea [ ] Vomiting  Genitourinary: [ ] Nocturia [ ] Dysuria [ ] Incontinence  Extremities: [ ] Swelling [ ] Joint Pain  Neurologic: [ ] Focal deficit [ ] Paresthesias [ ] Syncope  Lymphatic: [ ] Swelling [ ] Lymphadenopathy   Skin: [ ] Rash [ ] Ecchymoses [ ] Wounds [ ] Lesions  Psychiatry: [ ] Depression [ ] Suicidal/Homicidal Ideation [ ] Anxiety [ ] Sleep Disturbances  [x ] 10 point review of systems is otherwise negative except as mentioned above              [ ]Unable to obtain  All other systems were reviewed and are negative, except as noted.    VITALS/PHYSICAL EXAM  --------------------------------------------------------------------------------  T(C): 36.4 (18 @ 11:24), Max: 36.9 (18 @ 05:05)  HR: 66 (18 @ 11:24) (63 - 84)  BP: 158/94 (18 @ 11:24) (132/68 - 159/98)  RR: 18 (18 @ 11:24) (16 - 18)  SpO2: 100% (18 @ 11:24) (99% - 100%)  Wt(kg): --      Daily     Daily Weight in k.3 (2018 11:06)  I&O's Summary    2018 07:01  -  2018 07:00  --------------------------------------------------------  IN: 400 mL / OUT: 2400 mL / NET: -2000 mL    2018 07:01  -  2018 12:30  --------------------------------------------------------  IN: 900 mL / OUT: 2100 mL / NET: -1200 mL          18 @ 07:01  -  18 @ 07:00  --------------------------------------------------------  IN: 400 mL / OUT: 2400 mL / NET: -2000 mL    18 @ 07:01  -  18 @ 12:30  --------------------------------------------------------  IN: 900 mL / OUT: 2100 mL / NET: -1200 mL        Physical Exam:  	Gen: NAD   	HEENT: anicteric  	Pulm: CTA B/L   	CV: RRR  	Back: No dependent edema  	Abd: soft, nontender, nondistended  	: No lyric  	LE: Warm, no edema  	Neuro: no asterixis  	Skin: Warm, without rashes  	Vascular access: LAVF with thrill and bruit    LABS/STUDIES  --------------------------------------------------------------------------------              10.9   9.38  >-----------<  241      [18 @ 04:20]              33.9     135  |  90  |  111  ----------------------------<  147      [18 @ 07:31]  5.8   |  24  |  8.09        Ca     7.7     [18 @ 07:31]      Mg     2.6     [18 @ 07:31]      Phos  8.2     [18 @ 07:31]      PT/INR: PT 12.9 , INR 1.16       [18 @ 04:20]      .5 (Ca --)      [18 @ 06:00]   --  HbA1c 7.7      [18 @ 09:20]    HBsAb 31.9      [18 @ 16:50]  HBsAg NEGATIVE      [18 16:50]  HBcAb Reactive      [18 16:50]  HCV 0.20, Nonreactive Hepatitis C AB  S/CO Ratio                        Interpretation  < 1.0                                     Non-Reactive  1.0 - 4.9                           Weakly-Reactive  > 5.0                                 Reactive  Non-Reactive: Aperson with a non-reactive HCV antibody  result is considered uninfected.  No further action is  needed unless recent infection is suspected.  In these  cases, consider repeat testing later to detect  seroconversion..  Weakly-Reactive: HCV antibody test is abnormal, HCV RNA  Qualitative test will follow.  Reactive: HCV antibody test is abnormal, HCV RNA  Qualitative test will follow.  Note: HCV antibody testing is performed on the Abbott   system.      [18 @ 16:50]        Radiology  --------------------------------------------------------------------------------    --------------------------------------------------------------------------------  Dayron Ayala

## 2018-04-05 DIAGNOSIS — I10 ESSENTIAL (PRIMARY) HYPERTENSION: ICD-10-CM

## 2018-04-05 DIAGNOSIS — K59.00 CONSTIPATION, UNSPECIFIED: ICD-10-CM

## 2018-04-05 LAB
BUN SERPL-MCNC: 46 MG/DL — HIGH (ref 7–23)
BUN SERPL-MCNC: 67 MG/DL — HIGH (ref 7–23)
CALCIUM SERPL-MCNC: 8 MG/DL — LOW (ref 8.4–10.5)
CALCIUM SERPL-MCNC: 9 MG/DL — SIGNIFICANT CHANGE UP (ref 8.4–10.5)
CHLORIDE SERPL-SCNC: 90 MMOL/L — LOW (ref 98–107)
CHLORIDE SERPL-SCNC: 92 MMOL/L — LOW (ref 98–107)
CO2 SERPL-SCNC: 27 MMOL/L — SIGNIFICANT CHANGE UP (ref 22–31)
CO2 SERPL-SCNC: 28 MMOL/L — SIGNIFICANT CHANGE UP (ref 22–31)
CREAT SERPL-MCNC: 4.35 MG/DL — HIGH (ref 0.5–1.3)
CREAT SERPL-MCNC: 6.33 MG/DL — HIGH (ref 0.5–1.3)
GLUCOSE SERPL-MCNC: 123 MG/DL — HIGH (ref 70–99)
GLUCOSE SERPL-MCNC: 125 MG/DL — HIGH (ref 70–99)
HCT VFR BLD CALC: 35.3 % — SIGNIFICANT CHANGE UP (ref 34.5–45)
HGB BLD-MCNC: 11 G/DL — LOW (ref 11.5–15.5)
MAGNESIUM SERPL-MCNC: 2.3 MG/DL — SIGNIFICANT CHANGE UP (ref 1.6–2.6)
MAGNESIUM SERPL-MCNC: 2.4 MG/DL — SIGNIFICANT CHANGE UP (ref 1.6–2.6)
MCHC RBC-ENTMCNC: 28.8 PG — SIGNIFICANT CHANGE UP (ref 27–34)
MCHC RBC-ENTMCNC: 31.2 % — LOW (ref 32–36)
MCV RBC AUTO: 92.4 FL — SIGNIFICANT CHANGE UP (ref 80–100)
NRBC # FLD: 0 — SIGNIFICANT CHANGE UP
PHOSPHATE SERPL-MCNC: 7.9 MG/DL — HIGH (ref 2.5–4.5)
PLATELET # BLD AUTO: 240 K/UL — SIGNIFICANT CHANGE UP (ref 150–400)
PMV BLD: 10.8 FL — SIGNIFICANT CHANGE UP (ref 7–13)
POTASSIUM SERPL-MCNC: 4.1 MMOL/L — SIGNIFICANT CHANGE UP (ref 3.5–5.3)
POTASSIUM SERPL-MCNC: 5.4 MMOL/L — HIGH (ref 3.5–5.3)
POTASSIUM SERPL-SCNC: 4.1 MMOL/L — SIGNIFICANT CHANGE UP (ref 3.5–5.3)
POTASSIUM SERPL-SCNC: 5.4 MMOL/L — HIGH (ref 3.5–5.3)
RBC # BLD: 3.82 M/UL — SIGNIFICANT CHANGE UP (ref 3.8–5.2)
RBC # FLD: 13.7 % — SIGNIFICANT CHANGE UP (ref 10.3–14.5)
SODIUM SERPL-SCNC: 135 MMOL/L — SIGNIFICANT CHANGE UP (ref 135–145)
SODIUM SERPL-SCNC: 136 MMOL/L — SIGNIFICANT CHANGE UP (ref 135–145)
WBC # BLD: 8.54 K/UL — SIGNIFICANT CHANGE UP (ref 3.8–10.5)
WBC # FLD AUTO: 8.54 K/UL — SIGNIFICANT CHANGE UP (ref 3.8–10.5)

## 2018-04-05 PROCEDURE — 99232 SBSQ HOSP IP/OBS MODERATE 35: CPT

## 2018-04-05 PROCEDURE — 99233 SBSQ HOSP IP/OBS HIGH 50: CPT

## 2018-04-05 RX ORDER — HEPARIN SODIUM 5000 [USP'U]/ML
5000 INJECTION INTRAVENOUS; SUBCUTANEOUS EVERY 8 HOURS
Refills: 0 | Status: DISCONTINUED | OUTPATIENT
Start: 2018-04-05 | End: 2018-04-09

## 2018-04-05 RX ORDER — SENNA PLUS 8.6 MG/1
2 TABLET ORAL AT BEDTIME
Refills: 0 | Status: DISCONTINUED | OUTPATIENT
Start: 2018-04-05 | End: 2018-04-10

## 2018-04-05 RX ORDER — DOCUSATE SODIUM 100 MG
100 CAPSULE ORAL THREE TIMES A DAY
Refills: 0 | Status: DISCONTINUED | OUTPATIENT
Start: 2018-04-05 | End: 2018-04-10

## 2018-04-05 RX ORDER — POLYETHYLENE GLYCOL 3350 17 G/17G
17 POWDER, FOR SOLUTION ORAL ONCE
Refills: 0 | Status: COMPLETED | OUTPATIENT
Start: 2018-04-05 | End: 2018-04-05

## 2018-04-05 RX ORDER — SEVELAMER CARBONATE 2400 MG/1
1600 POWDER, FOR SUSPENSION ORAL THREE TIMES A DAY
Refills: 0 | Status: DISCONTINUED | OUTPATIENT
Start: 2018-04-05 | End: 2018-04-10

## 2018-04-05 RX ORDER — ACETAZOLAMIDE 250 MG/1
500 TABLET ORAL
Refills: 0 | Status: DISCONTINUED | OUTPATIENT
Start: 2018-04-05 | End: 2018-04-09

## 2018-04-05 RX ADMIN — CALCITRIOL 0.25 MICROGRAM(S): 0.5 CAPSULE ORAL at 15:16

## 2018-04-05 RX ADMIN — Medication 2001 MILLIGRAM(S): at 18:22

## 2018-04-05 RX ADMIN — HEPARIN SODIUM 5000 UNIT(S): 5000 INJECTION INTRAVENOUS; SUBCUTANEOUS at 15:19

## 2018-04-05 RX ADMIN — Medication 100 MILLIGRAM(S): at 06:03

## 2018-04-05 RX ADMIN — SEVELAMER CARBONATE 1600 MILLIGRAM(S): 2400 POWDER, FOR SUSPENSION ORAL at 15:32

## 2018-04-05 RX ADMIN — Medication 1 DROP(S): at 06:03

## 2018-04-05 RX ADMIN — SENNA PLUS 2 TABLET(S): 8.6 TABLET ORAL at 21:54

## 2018-04-05 RX ADMIN — DORZOLAMIDE HYDROCHLORIDE 1 DROP(S): 20 SOLUTION/ DROPS OPHTHALMIC at 15:19

## 2018-04-05 RX ADMIN — Medication 1 DROP(S): at 18:22

## 2018-04-05 RX ADMIN — Medication 1 DROP(S): at 21:52

## 2018-04-05 RX ADMIN — OXCARBAZEPINE 300 MILLIGRAM(S): 300 TABLET, FILM COATED ORAL at 06:03

## 2018-04-05 RX ADMIN — ACETAZOLAMIDE 500 MILLIGRAM(S): 250 TABLET ORAL at 13:39

## 2018-04-05 RX ADMIN — Medication 15 UNIT(S): at 15:24

## 2018-04-05 RX ADMIN — BRIMONIDINE TARTRATE 1 DROP(S): 2 SOLUTION/ DROPS OPHTHALMIC at 21:52

## 2018-04-05 RX ADMIN — BRIMONIDINE TARTRATE 1 DROP(S): 2 SOLUTION/ DROPS OPHTHALMIC at 06:03

## 2018-04-05 RX ADMIN — SEVELAMER CARBONATE 1600 MILLIGRAM(S): 2400 POWDER, FOR SUSPENSION ORAL at 21:51

## 2018-04-05 RX ADMIN — Medication 2001 MILLIGRAM(S): at 15:17

## 2018-04-05 RX ADMIN — Medication 100 MILLIGRAM(S): at 21:51

## 2018-04-05 RX ADMIN — DORZOLAMIDE HYDROCHLORIDE 1 DROP(S): 20 SOLUTION/ DROPS OPHTHALMIC at 21:52

## 2018-04-05 RX ADMIN — Medication 15 UNIT(S): at 09:53

## 2018-04-05 RX ADMIN — INSULIN GLARGINE 9 UNIT(S): 100 INJECTION, SOLUTION SUBCUTANEOUS at 21:52

## 2018-04-05 RX ADMIN — Medication 30 MILLIGRAM(S): at 15:17

## 2018-04-05 RX ADMIN — POLYETHYLENE GLYCOL 3350 17 GRAM(S): 17 POWDER, FOR SOLUTION ORAL at 15:33

## 2018-04-05 RX ADMIN — OXCARBAZEPINE 300 MILLIGRAM(S): 300 TABLET, FILM COATED ORAL at 18:22

## 2018-04-05 RX ADMIN — HEPARIN SODIUM 5000 UNIT(S): 5000 INJECTION INTRAVENOUS; SUBCUTANEOUS at 21:52

## 2018-04-05 RX ADMIN — Medication 1 DROP(S): at 15:19

## 2018-04-05 RX ADMIN — Medication 2: at 15:24

## 2018-04-05 RX ADMIN — Medication 100 MILLIGRAM(S): at 15:33

## 2018-04-05 RX ADMIN — Medication 15 UNIT(S): at 18:28

## 2018-04-05 RX ADMIN — OXYCODONE HYDROCHLORIDE 5 MILLIGRAM(S): 5 TABLET ORAL at 15:33

## 2018-04-05 RX ADMIN — ACETAZOLAMIDE 500 MILLIGRAM(S): 250 TABLET ORAL at 21:51

## 2018-04-05 RX ADMIN — Medication 100 MILLIGRAM(S): at 18:23

## 2018-04-05 RX ADMIN — OXYCODONE HYDROCHLORIDE 5 MILLIGRAM(S): 5 TABLET ORAL at 16:15

## 2018-04-05 RX ADMIN — ATORVASTATIN CALCIUM 20 MILLIGRAM(S): 80 TABLET, FILM COATED ORAL at 21:52

## 2018-04-05 RX ADMIN — BRIMONIDINE TARTRATE 1 DROP(S): 2 SOLUTION/ DROPS OPHTHALMIC at 15:20

## 2018-04-05 RX ADMIN — Medication 2: at 18:30

## 2018-04-05 RX ADMIN — DORZOLAMIDE HYDROCHLORIDE 1 DROP(S): 20 SOLUTION/ DROPS OPHTHALMIC at 06:03

## 2018-04-05 RX ADMIN — OXYCODONE HYDROCHLORIDE 5 MILLIGRAM(S): 5 TABLET ORAL at 09:04

## 2018-04-05 RX ADMIN — LATANOPROST 1 DROP(S): 0.05 SOLUTION/ DROPS OPHTHALMIC; TOPICAL at 21:52

## 2018-04-05 NOTE — PROGRESS NOTE ADULT - PROBLEM SELECTOR PLAN 3
- Hyperkalemia, having dialysis today, pt counselled on diet to avoid potassium rich foods  - Will need SW evaluation re: transportation issues to and from HD  rpt bmp later today as pt now on diamox

## 2018-04-05 NOTE — PROGRESS NOTE ADULT - SUBJECTIVE AND OBJECTIVE BOX
[facility] DIVISION OF KIDNEY DISEASES AND HYPERTENSION -- HEMODIALYSIS NOTE  --------------------------------------------------------------------------------  Chief Complaint: ESRD/Ongoing hemodialysis requirement    24 hour events/subjective: Doing slightly better. Eye surgery now to be done as an outpatient. K normalized post HD yesterday however, she is hyperkalemic again today likely in the setting of dietary indiscretion. No CP or SOB.     PAST HISTORY  --------------------------------------------------------------------------------  No significant changes to PMH, PSH, FHx, SHx, unless otherwise noted    ALLERGIES & MEDICATIONS  --------------------------------------------------------------------------------  Allergies    No Known Allergies    Intolerances      Standing Inpatient Medications  acetazolamide    Tablet 500 milliGRAM(s) Oral <User Schedule>  artificial  tears Solution 1 Drop(s) Left EYE three times a day  atorvastatin 20 milliGRAM(s) Oral at bedtime  brimonidine 0.2% Ophthalmic Solution 1 Drop(s) Left EYE three times a day  calcitriol   Capsule 0.25 MICROGram(s) Oral daily  calcium acetate 2001 milliGRAM(s) Oral three times a day with meals  dextrose 5%. 1000 milliLiter(s) IV Continuous <Continuous>  dextrose 50% Injectable 12.5 Gram(s) IV Push once  dextrose 50% Injectable 25 Gram(s) IV Push once  dextrose 50% Injectable 25 Gram(s) IV Push once  docusate sodium 100 milliGRAM(s) Oral three times a day  dorzolamide 2% Ophthalmic Solution 1 Drop(s) Left EYE three times a day  haloperidol     Tablet 1 milliGRAM(s) Oral at bedtime  insulin glargine Injectable (LANTUS) 9 Unit(s) SubCutaneous at bedtime  insulin lispro (HumaLOG) corrective regimen sliding scale   SubCutaneous three times a day before meals  insulin lispro (HumaLOG) corrective regimen sliding scale   SubCutaneous at bedtime  insulin lispro Injectable (HumaLOG) 15 Unit(s) SubCutaneous three times a day before meals  latanoprost 0.005% Ophthalmic Solution 1 Drop(s) Left EYE at bedtime  metoprolol tartrate 100 milliGRAM(s) Oral two times a day  NIFEdipine XL 30 milliGRAM(s) Oral daily  OXcarbazepine 300 milliGRAM(s) Oral two times a day  polyethylene glycol 3350 17 Gram(s) Oral once  pregabalin 100 milliGRAM(s) Oral two times a day  senna 2 Tablet(s) Oral at bedtime  timolol 0.5% Solution 1 Drop(s) Left EYE two times a day    PRN Inpatient Medications  acetaminophen   Tablet. 650 milliGRAM(s) Oral every 6 hours PRN  dextrose Gel 1 Dose(s) Oral once PRN  glucagon  Injectable 1 milliGRAM(s) IntraMuscular once PRN  haloperidol     Tablet 2 milliGRAM(s) Oral every 6 hours PRN  haloperidol    Injectable 2 milliGRAM(s) IV Push every 6 hours PRN  haloperidol    Injectable 2 milliGRAM(s) IntraMuscular every 6 hours PRN  oxyCODONE    IR 5 milliGRAM(s) Oral every 6 hours PRN      REVIEW OF SYSTEMS  --------------------------------------------------------------------------------  Constitutional: [ ] Fever [ ] Chills [ ] Fatigue [ ] Weight change   HEENT: [ ] Blurred vision [x] Eye Pain [ ] Headache [ ] Runny nose [ ] Sore Throat   Respiratory: [ ] Cough [ ] Wheezing [ ] Shortness of breath  Cardiovascular: [ ] Chest Pain [ ] Palpitations [ ] TORRES [ ] PND [ ] Orthopnea  Gastrointestinal: [ ] Abdominal Pain [ ] Diarrhea [ ] Constipation [ ] Hemorrhoids [ ] Nausea [ ] Vomiting  Genitourinary: [ ] Nocturia [ ] Dysuria [ ] Incontinence  Extremities: [ ] Swelling [ ] Joint Pain  Neurologic: [ ] Focal deficit [ ] Paresthesias [ ] Syncope  Lymphatic: [ ] Swelling [ ] Lymphadenopathy   Skin: [ ] Rash [ ] Ecchymoses [ ] Wounds [ ] Lesions  Psychiatry: [ ] Depression [ ] Suicidal/Homicidal Ideation [ ] Anxiety [ ] Sleep Disturbances  [x] 10 point review of systems is otherwise negative except as mentioned above              [ ]Unable to obtain  All other systems were reviewed and are negative, except as noted.    VITALS/PHYSICAL EXAM  --------------------------------------------------------------------------------  T(C): 36.7 (18 @ 09:10), Max: 36.8 (18 @ 14:35)  HR: 61 (18 @ 09:10) (61 - 77)  BP: 123/69 (18 @ 09:10) (119/67 - 138/78)  RR: 18 (18 @ 09:10) (17 - 18)  SpO2: 100% (18 @ 05:55) (100% - 100%)  Wt(kg): --      Daily     Daily Weight in k.8 (2018 09:10)  I&O's Summary    2018 07:01  -  2018 07:00  --------------------------------------------------------  IN: 900 mL / OUT: 2100 mL / NET: -1200 mL        Physical Exam:  	Gen: NAD   	HEENT: anicteric  	Pulm: CTA B/L   	CV: RRR  	Back: No dependent edema  	Abd: soft, nontender, nondistended  	: No gunter  	LE: Warm, no edema  	Neuro: no asterixis  	Skin: Warm, without rashes  	Vascular access: L arm AVF    LABS/STUDIES  --------------------------------------------------------------------------------              11.0   8.54  >-----------<  240      [18 06:37]              35.3     135  |  90  |  67  ----------------------------<  123      [18:37]  5.4   |  27  |  6.33        Ca     8.0     [18:37]      Mg     2.4     [18:37]      Phos  7.9     [18:37]      PT/INR: PT 12.9 , INR 1.16       [18 04:20]      .5 (Ca --)      [18 @ 06:00]   --  HbA1c 7.7      [18 @ 09:20]    HBsAb 31.9      [18 @ 16:50]  HBsAg NEGATIVE      [18 16:50]  HBcAb Reactive      [18 16:50]  HCV 0.20, Nonreactive Hepatitis C AB  S/CO Ratio                        Interpretation  < 1.0                                     Non-Reactive  1.0 - 4.9                           Weakly-Reactive  > 5.0                                 Reactive  Non-Reactive: Aperson with a non-reactive HCV antibody  result is considered uninfected.  No further action is  needed unless recent infection is suspected.  In these  cases, consider repeat testing later to detect  seroconversion..  Weakly-Reactive: HCV antibody test is abnormal, HCV RNA  Qualitative test will follow.  Reactive: HCV antibody test is abnormal, HCV RNA  Qualitative test will follow.  Note: HCV antibody testing is performed on the [employer]   system.      [18 @ 16:50]        Radiology  --------------------------------------------------------------------------------    --------------------------------------------------------------------------------  Dayron Ayala

## 2018-04-05 NOTE — PROGRESS NOTE ADULT - PROBLEM SELECTOR PLAN 1
Routine HD today to keep on TTS schedule.  1K bath in the setting of hyperkalemia.  Anticipate next routine HD on Sat, 4/7.

## 2018-04-05 NOTE — PROGRESS NOTE ADULT - PROBLEM SELECTOR PLAN 1
[place] by opthalmology, cont diamox, cont eye drops, discussed with Ophthalmology  per ophthalmology no urgent need for sx since her IOP was coming down, pt will need to f/u as outpt. f/u today's ophthalmolgy note

## 2018-04-05 NOTE — PROGRESS NOTE ADULT - SUBJECTIVE AND OBJECTIVE BOX
Patient is a 37y old  Female who presents with a chief complaint of Hyperkalemia (01 Apr 2018 09:18)      SUBJECTIVE / OVERNIGHT EVENTS: Pt was seen and examined at 11:15am in the dialysis suite, overnight events noted ( low sugar and lantus was held last night) Pt reports that her left eye pain and vision is better today. No other issues reported.     MEDICATIONS  (STANDING):  acetazolamide    Tablet 500 milliGRAM(s) Oral two times a day  artificial  tears Solution 1 Drop(s) Left EYE three times a day  atorvastatin 20 milliGRAM(s) Oral at bedtime  brimonidine 0.2% Ophthalmic Solution 1 Drop(s) Left EYE three times a day  calcitriol   Capsule 0.25 MICROGram(s) Oral daily  calcium acetate 2001 milliGRAM(s) Oral three times a day with meals  dextrose 5%. 1000 milliLiter(s) (50 mL/Hr) IV Continuous <Continuous>  dextrose 50% Injectable 12.5 Gram(s) IV Push once  dextrose 50% Injectable 25 Gram(s) IV Push once  dextrose 50% Injectable 25 Gram(s) IV Push once  docusate sodium 100 milliGRAM(s) Oral three times a day  dorzolamide 2% Ophthalmic Solution 1 Drop(s) Left EYE three times a day  haloperidol     Tablet 1 milliGRAM(s) Oral at bedtime  insulin glargine Injectable (LANTUS) 9 Unit(s) SubCutaneous at bedtime  insulin lispro (HumaLOG) corrective regimen sliding scale   SubCutaneous three times a day before meals  insulin lispro (HumaLOG) corrective regimen sliding scale   SubCutaneous at bedtime  insulin lispro Injectable (HumaLOG) 15 Unit(s) SubCutaneous three times a day before meals  latanoprost 0.005% Ophthalmic Solution 1 Drop(s) Left EYE at bedtime  metoprolol tartrate 100 milliGRAM(s) Oral two times a day  NIFEdipine XL 30 milliGRAM(s) Oral daily  OXcarbazepine 300 milliGRAM(s) Oral two times a day  polyethylene glycol 3350 17 Gram(s) Oral once  pregabalin 100 milliGRAM(s) Oral two times a day  senna 2 Tablet(s) Oral at bedtime  sevelamer hydrochloride 1600 milliGRAM(s) Oral three times a day  timolol 0.5% Solution 1 Drop(s) Left EYE two times a day    MEDICATIONS  (PRN):  acetaminophen   Tablet. 650 milliGRAM(s) Oral every 6 hours PRN mild, moderate pain  dextrose Gel 1 Dose(s) Oral once PRN Blood Glucose LESS THAN 70 milliGRAM(s)/deciliter  glucagon  Injectable 1 milliGRAM(s) IntraMuscular once PRN Glucose LESS THAN 70 milligrams/deciliter  haloperidol     Tablet 2 milliGRAM(s) Oral every 6 hours PRN Agitation  haloperidol    Injectable 2 milliGRAM(s) IV Push every 6 hours PRN Agitation  haloperidol    Injectable 2 milliGRAM(s) IntraMuscular every 6 hours PRN Agitation  oxyCODONE    IR 5 milliGRAM(s) Oral every 6 hours PRN Severe Pain (7 - 10)      Vital Signs Last 24 Hrs  T(C): 36.7 (05 Apr 2018 09:10), Max: 36.8 (04 Apr 2018 14:35)  T(F): 98 (05 Apr 2018 09:10), Max: 98.2 (04 Apr 2018 14:35)  HR: 61 (05 Apr 2018 09:10) (61 - 77)  BP: 123/69 (05 Apr 2018 09:10) (119/67 - 138/78)  BP(mean): --  RR: 18 (05 Apr 2018 09:10) (17 - 18)  SpO2: 100% (05 Apr 2018 05:55) (100% - 100%)  CAPILLARY BLOOD GLUCOSE      POCT Blood Glucose.: 129 mg/dL (05 Apr 2018 09:36)  POCT Blood Glucose.: 106 mg/dL (04 Apr 2018 23:10)  POCT Blood Glucose.: 70 mg/dL (04 Apr 2018 22:37)  POCT Blood Glucose.: 129 mg/dL (04 Apr 2018 17:49)  POCT Blood Glucose.: 172 mg/dL (04 Apr 2018 13:55)    I&O's Summary    04 Apr 2018 07:01  -  05 Apr 2018 07:00  --------------------------------------------------------  IN: 900 mL / OUT: 2100 mL / NET: -1200 mL        PHYSICAL EXAM:  GENERAL: NAD, well-developed  CHEST/LUNG: Clear to auscultation bilaterally; No wheeze  HEART: Regular rate and rhythm  ABDOMEN: Soft, Nontender, Nondistended  EXTREMITIES:  left leg BKA, Rt LE no edema  PSYCH: calm  NEUROLOGY: AAOx3  Skin: left avf +      LABS:                        11.0   8.54  )-----------( 240      ( 05 Apr 2018 06:37 )             35.3     04-05    135  |  90<L>  |  67<H>  ----------------------------<  123<H>  5.4<H>   |  27  |  6.33<H>    Ca    8.0<L>      05 Apr 2018 06:37  Phos  7.9     04-05  Mg     2.4     04-05      PT/INR - ( 04 Apr 2018 04:20 )   PT: 12.9 SEC;   INR: 1.16                    RADIOLOGY & ADDITIONAL TESTS:    Imaging Personally Reviewed:    Consultant(s) Notes Reviewed:      Care Discussed with Consultants/Other Providers: Patient is a 37y old  Female who presents with a chief complaint of Hyperkalemia (01 Apr 2018 09:18)      SUBJECTIVE / OVERNIGHT EVENTS: Pt was seen and examined at 11:15am in the dialysis suite, overnight events noted ( low sugar and lantus was held last night) Pt reports that her left eye pain and vision is better today. Reports constipation x3 days, no nausea, vomiting for abdominal pain. No other issues reported.     MEDICATIONS  (STANDING):  acetazolamide    Tablet 500 milliGRAM(s) Oral two times a day  artificial  tears Solution 1 Drop(s) Left EYE three times a day  atorvastatin 20 milliGRAM(s) Oral at bedtime  brimonidine 0.2% Ophthalmic Solution 1 Drop(s) Left EYE three times a day  calcitriol   Capsule 0.25 MICROGram(s) Oral daily  calcium acetate 2001 milliGRAM(s) Oral three times a day with meals  dextrose 5%. 1000 milliLiter(s) (50 mL/Hr) IV Continuous <Continuous>  dextrose 50% Injectable 12.5 Gram(s) IV Push once  dextrose 50% Injectable 25 Gram(s) IV Push once  dextrose 50% Injectable 25 Gram(s) IV Push once  docusate sodium 100 milliGRAM(s) Oral three times a day  dorzolamide 2% Ophthalmic Solution 1 Drop(s) Left EYE three times a day  haloperidol     Tablet 1 milliGRAM(s) Oral at bedtime  insulin glargine Injectable (LANTUS) 9 Unit(s) SubCutaneous at bedtime  insulin lispro (HumaLOG) corrective regimen sliding scale   SubCutaneous three times a day before meals  insulin lispro (HumaLOG) corrective regimen sliding scale   SubCutaneous at bedtime  insulin lispro Injectable (HumaLOG) 15 Unit(s) SubCutaneous three times a day before meals  latanoprost 0.005% Ophthalmic Solution 1 Drop(s) Left EYE at bedtime  metoprolol tartrate 100 milliGRAM(s) Oral two times a day  NIFEdipine XL 30 milliGRAM(s) Oral daily  OXcarbazepine 300 milliGRAM(s) Oral two times a day  polyethylene glycol 3350 17 Gram(s) Oral once  pregabalin 100 milliGRAM(s) Oral two times a day  senna 2 Tablet(s) Oral at bedtime  sevelamer hydrochloride 1600 milliGRAM(s) Oral three times a day  timolol 0.5% Solution 1 Drop(s) Left EYE two times a day    MEDICATIONS  (PRN):  acetaminophen   Tablet. 650 milliGRAM(s) Oral every 6 hours PRN mild, moderate pain  dextrose Gel 1 Dose(s) Oral once PRN Blood Glucose LESS THAN 70 milliGRAM(s)/deciliter  glucagon  Injectable 1 milliGRAM(s) IntraMuscular once PRN Glucose LESS THAN 70 milligrams/deciliter  haloperidol     Tablet 2 milliGRAM(s) Oral every 6 hours PRN Agitation  haloperidol    Injectable 2 milliGRAM(s) IV Push every 6 hours PRN Agitation  haloperidol    Injectable 2 milliGRAM(s) IntraMuscular every 6 hours PRN Agitation  oxyCODONE    IR 5 milliGRAM(s) Oral every 6 hours PRN Severe Pain (7 - 10)      Vital Signs Last 24 Hrs  T(C): 36.7 (05 Apr 2018 09:10), Max: 36.8 (04 Apr 2018 14:35)  T(F): 98 (05 Apr 2018 09:10), Max: 98.2 (04 Apr 2018 14:35)  HR: 61 (05 Apr 2018 09:10) (61 - 77)  BP: 123/69 (05 Apr 2018 09:10) (119/67 - 138/78)  BP(mean): --  RR: 18 (05 Apr 2018 09:10) (17 - 18)  SpO2: 100% (05 Apr 2018 05:55) (100% - 100%)  CAPILLARY BLOOD GLUCOSE      POCT Blood Glucose.: 129 mg/dL (05 Apr 2018 09:36)  POCT Blood Glucose.: 106 mg/dL (04 Apr 2018 23:10)  POCT Blood Glucose.: 70 mg/dL (04 Apr 2018 22:37)  POCT Blood Glucose.: 129 mg/dL (04 Apr 2018 17:49)  POCT Blood Glucose.: 172 mg/dL (04 Apr 2018 13:55)    I&O's Summary    04 Apr 2018 07:01  -  05 Apr 2018 07:00  --------------------------------------------------------  IN: 900 mL / OUT: 2100 mL / NET: -1200 mL        PHYSICAL EXAM:  GENERAL: NAD, well-developed  CHEST/LUNG: Clear to auscultation bilaterally; No wheeze  HEART: Regular rate and rhythm  ABDOMEN: Soft, Nontender, Nondistended  EXTREMITIES:  left leg BKA, Rt LE no edema  PSYCH: calm  NEUROLOGY: AAOx3  Skin: left avf +      LABS:                        11.0   8.54  )-----------( 240      ( 05 Apr 2018 06:37 )             35.3     04-05    135  |  90<L>  |  67<H>  ----------------------------<  123<H>  5.4<H>   |  27  |  6.33<H>    Ca    8.0<L>      05 Apr 2018 06:37  Phos  7.9     04-05  Mg     2.4     04-05      PT/INR - ( 04 Apr 2018 04:20 )   PT: 12.9 SEC;   INR: 1.16                    RADIOLOGY & ADDITIONAL TESTS:    Imaging Personally Reviewed:    Consultant(s) Notes Reviewed:      Care Discussed with Consultants/Other Providers:

## 2018-04-05 NOTE — PROGRESS NOTE ADULT - PROBLEM SELECTOR PLAN 3
Phos also elevated likely secondary to dietary indiscretion.  Cont Ca acetate  Add sevelamer 1600 mg PO TID  Cont calcitriol  Check Vit D and PTH.

## 2018-04-05 NOTE — PROGRESS NOTE ADULT - SUBJECTIVE AND OBJECTIVE BOX
[facility] Ophthalmology Progress Note    S: Pt seen and examined in dialysis, reports pain is improved and no subjective visual changes.     ROS:  General (neg), Vision (per HPI), Head and Neck (neg), Pulm (neg), CV (neg), GI (neg),  (neg), Musculoskeletal (neg), Skin/Integ (neg), Neuro (neg), Endocrine (neg), Heme (neg), All/Immuno (neg)    Mood and Affect Appropriate ( x ),  Oriented to Time, Place, and Person x 3 ( x )    Ophthalmology Exam    Visual acuity NLP OD, 20/200  Pupils: PERRL OU, no APD  Ttono: 35 OD, 19 OS  Extraocular movements (EOMs): Full OU, no pain, no diplopia    Pen light exam (PLE)  External:  Flat OU  Lids/Lashes/Lacrimal Ducts: Flat OU    Sclera/Conjunctiva:  W+Q OD, tube shunt superotemporally, W&Q OS  Cornea: 2+ MCE OD, clear OS  Anterior Chamber: superotemporal tube abutting iris OD, FLat OS   Iris:  Flat OU, no NVI  Lens:  NS OU    A/P   37 F h/o HTN, DM and ESRD on HD  p/w diaphoresis and vomiting found to be hyperkalemic, 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 [address]  [phone] [facility] Ophthalmology Progress Note    S: Pt seen and examined in dialysis, reports pain is improved and no subjective visual changes.     ROS:  General (neg), Vision (per HPI), Head and Neck (neg), Pulm (neg), CV (neg), GI (neg),  (neg), Musculoskeletal (neg), Skin/Integ (neg), Neuro (neg), Endocrine (neg), Heme (neg), All/Immuno (neg)    Mood and Affect Appropriate ( x ),  Oriented to Time, Place, and Person x 3 ( x )    Ophthalmology Exam    Visual acuity NLP OD, 20/200  Pupils: PERRL OU, no APD  Ttono: 35 OD, 19 OS  Extraocular movements (EOMs): Full OU, no pain, no diplopia    Pen light exam (PLE)  External:  Flat OU  Lids/Lashes/Lacrimal Ducts: Flat OU    Sclera/Conjunctiva:  W+Q OD, tube shunt superotemporally, W&Q OS  Cornea: 2+ MCE OD, clear OS  Anterior Chamber: superotemporal tube abutting iris OD, FLat OS   Iris:  Flat OU, no NVI  Lens:  NS OU    A/P   37 F h/o HTN, DM and ESRD on HD  p/w diaphoresis and vomiting found to be hyperkalemic, 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 Mina Singh (Retina attending)      Follow-Up:  Patient should follow up [facility] after discharge   [address] [place] [address].  [place] [address]  [phone]

## 2018-04-06 LAB
BASOPHILS # BLD AUTO: 0.03 K/UL — SIGNIFICANT CHANGE UP (ref 0–0.2)
BASOPHILS NFR BLD AUTO: 0.3 % — SIGNIFICANT CHANGE UP (ref 0–2)
BUN SERPL-MCNC: 69 MG/DL — HIGH (ref 7–23)
CALCIUM SERPL-MCNC: 8.5 MG/DL — SIGNIFICANT CHANGE UP (ref 8.4–10.5)
CHLORIDE SERPL-SCNC: 88 MMOL/L — LOW (ref 98–107)
CO2 SERPL-SCNC: 27 MMOL/L — SIGNIFICANT CHANGE UP (ref 22–31)
CREAT SERPL-MCNC: 6.08 MG/DL — HIGH (ref 0.5–1.3)
EOSINOPHIL # BLD AUTO: 0.15 K/UL — SIGNIFICANT CHANGE UP (ref 0–0.5)
EOSINOPHIL NFR BLD AUTO: 1.7 % — SIGNIFICANT CHANGE UP (ref 0–6)
GLUCOSE SERPL-MCNC: 123 MG/DL — HIGH (ref 70–99)
HCT VFR BLD CALC: 34.7 % — SIGNIFICANT CHANGE UP (ref 34.5–45)
HGB BLD-MCNC: 10.7 G/DL — LOW (ref 11.5–15.5)
IMM GRANULOCYTES # BLD AUTO: 0.03 # — SIGNIFICANT CHANGE UP
IMM GRANULOCYTES NFR BLD AUTO: 0.3 % — SIGNIFICANT CHANGE UP (ref 0–1.5)
LYMPHOCYTES # BLD AUTO: 2.3 K/UL — SIGNIFICANT CHANGE UP (ref 1–3.3)
LYMPHOCYTES # BLD AUTO: 25.7 % — SIGNIFICANT CHANGE UP (ref 13–44)
MAGNESIUM SERPL-MCNC: 2.5 MG/DL — SIGNIFICANT CHANGE UP (ref 1.6–2.6)
MCHC RBC-ENTMCNC: 29.2 PG — SIGNIFICANT CHANGE UP (ref 27–34)
MCHC RBC-ENTMCNC: 30.8 % — LOW (ref 32–36)
MCV RBC AUTO: 94.8 FL — SIGNIFICANT CHANGE UP (ref 80–100)
MONOCYTES # BLD AUTO: 0.76 K/UL — SIGNIFICANT CHANGE UP (ref 0–0.9)
MONOCYTES NFR BLD AUTO: 8.5 % — SIGNIFICANT CHANGE UP (ref 2–14)
NEUTROPHILS # BLD AUTO: 5.68 K/UL — SIGNIFICANT CHANGE UP (ref 1.8–7.4)
NEUTROPHILS NFR BLD AUTO: 63.5 % — SIGNIFICANT CHANGE UP (ref 43–77)
NRBC # FLD: 0 — SIGNIFICANT CHANGE UP
PLATELET # BLD AUTO: 250 K/UL — SIGNIFICANT CHANGE UP (ref 150–400)
PMV BLD: 11.1 FL — SIGNIFICANT CHANGE UP (ref 7–13)
POTASSIUM SERPL-MCNC: 5.4 MMOL/L — HIGH (ref 3.5–5.3)
POTASSIUM SERPL-SCNC: 5.4 MMOL/L — HIGH (ref 3.5–5.3)
PTH-INTACT SERPL-MCNC: 167.9 PG/ML — HIGH (ref 15–65)
RBC # BLD: 3.66 M/UL — LOW (ref 3.8–5.2)
RBC # FLD: 13.5 % — SIGNIFICANT CHANGE UP (ref 10.3–14.5)
SODIUM SERPL-SCNC: 137 MMOL/L — SIGNIFICANT CHANGE UP (ref 135–145)
WBC # BLD: 8.95 K/UL — SIGNIFICANT CHANGE UP (ref 3.8–10.5)
WBC # FLD AUTO: 8.95 K/UL — SIGNIFICANT CHANGE UP (ref 3.8–10.5)

## 2018-04-06 PROCEDURE — 99232 SBSQ HOSP IP/OBS MODERATE 35: CPT

## 2018-04-06 PROCEDURE — 99233 SBSQ HOSP IP/OBS HIGH 50: CPT

## 2018-04-06 RX ADMIN — Medication 100 MILLIGRAM(S): at 21:40

## 2018-04-06 RX ADMIN — BRIMONIDINE TARTRATE 1 DROP(S): 2 SOLUTION/ DROPS OPHTHALMIC at 14:11

## 2018-04-06 RX ADMIN — ACETAZOLAMIDE 500 MILLIGRAM(S): 250 TABLET ORAL at 18:33

## 2018-04-06 RX ADMIN — OXYCODONE HYDROCHLORIDE 5 MILLIGRAM(S): 5 TABLET ORAL at 22:00

## 2018-04-06 RX ADMIN — Medication 1 DROP(S): at 07:10

## 2018-04-06 RX ADMIN — ACETAZOLAMIDE 500 MILLIGRAM(S): 250 TABLET ORAL at 07:11

## 2018-04-06 RX ADMIN — OXYCODONE HYDROCHLORIDE 5 MILLIGRAM(S): 5 TABLET ORAL at 07:40

## 2018-04-06 RX ADMIN — Medication 100 MILLIGRAM(S): at 18:33

## 2018-04-06 RX ADMIN — LATANOPROST 1 DROP(S): 0.05 SOLUTION/ DROPS OPHTHALMIC; TOPICAL at 21:39

## 2018-04-06 RX ADMIN — SENNA PLUS 2 TABLET(S): 8.6 TABLET ORAL at 21:40

## 2018-04-06 RX ADMIN — OXYCODONE HYDROCHLORIDE 5 MILLIGRAM(S): 5 TABLET ORAL at 07:10

## 2018-04-06 RX ADMIN — Medication 2001 MILLIGRAM(S): at 14:13

## 2018-04-06 RX ADMIN — BRIMONIDINE TARTRATE 1 DROP(S): 2 SOLUTION/ DROPS OPHTHALMIC at 21:39

## 2018-04-06 RX ADMIN — Medication 30 MILLIGRAM(S): at 07:11

## 2018-04-06 RX ADMIN — HEPARIN SODIUM 5000 UNIT(S): 5000 INJECTION INTRAVENOUS; SUBCUTANEOUS at 07:11

## 2018-04-06 RX ADMIN — Medication 100 MILLIGRAM(S): at 07:10

## 2018-04-06 RX ADMIN — Medication 2001 MILLIGRAM(S): at 18:33

## 2018-04-06 RX ADMIN — BRIMONIDINE TARTRATE 1 DROP(S): 2 SOLUTION/ DROPS OPHTHALMIC at 07:10

## 2018-04-06 RX ADMIN — Medication 1 DROP(S): at 14:11

## 2018-04-06 RX ADMIN — Medication 100 MILLIGRAM(S): at 14:13

## 2018-04-06 RX ADMIN — ATORVASTATIN CALCIUM 20 MILLIGRAM(S): 80 TABLET, FILM COATED ORAL at 21:40

## 2018-04-06 RX ADMIN — DORZOLAMIDE HYDROCHLORIDE 1 DROP(S): 20 SOLUTION/ DROPS OPHTHALMIC at 21:39

## 2018-04-06 RX ADMIN — SEVELAMER CARBONATE 1600 MILLIGRAM(S): 2400 POWDER, FOR SUSPENSION ORAL at 14:13

## 2018-04-06 RX ADMIN — SEVELAMER CARBONATE 1600 MILLIGRAM(S): 2400 POWDER, FOR SUSPENSION ORAL at 09:32

## 2018-04-06 RX ADMIN — Medication 1 DROP(S): at 21:39

## 2018-04-06 RX ADMIN — Medication 15 UNIT(S): at 18:33

## 2018-04-06 RX ADMIN — DORZOLAMIDE HYDROCHLORIDE 1 DROP(S): 20 SOLUTION/ DROPS OPHTHALMIC at 07:10

## 2018-04-06 RX ADMIN — OXYCODONE HYDROCHLORIDE 5 MILLIGRAM(S): 5 TABLET ORAL at 15:10

## 2018-04-06 RX ADMIN — Medication 15 UNIT(S): at 14:14

## 2018-04-06 RX ADMIN — OXYCODONE HYDROCHLORIDE 5 MILLIGRAM(S): 5 TABLET ORAL at 21:39

## 2018-04-06 RX ADMIN — HEPARIN SODIUM 5000 UNIT(S): 5000 INJECTION INTRAVENOUS; SUBCUTANEOUS at 14:13

## 2018-04-06 RX ADMIN — Medication 1 DROP(S): at 18:33

## 2018-04-06 RX ADMIN — DORZOLAMIDE HYDROCHLORIDE 1 DROP(S): 20 SOLUTION/ DROPS OPHTHALMIC at 14:11

## 2018-04-06 RX ADMIN — Medication 2001 MILLIGRAM(S): at 09:32

## 2018-04-06 RX ADMIN — OXCARBAZEPINE 300 MILLIGRAM(S): 300 TABLET, FILM COATED ORAL at 18:33

## 2018-04-06 RX ADMIN — Medication 2: at 09:32

## 2018-04-06 RX ADMIN — Medication 100 MILLIGRAM(S): at 18:37

## 2018-04-06 RX ADMIN — OXCARBAZEPINE 300 MILLIGRAM(S): 300 TABLET, FILM COATED ORAL at 07:10

## 2018-04-06 RX ADMIN — OXYCODONE HYDROCHLORIDE 5 MILLIGRAM(S): 5 TABLET ORAL at 14:12

## 2018-04-06 RX ADMIN — Medication 15 UNIT(S): at 09:33

## 2018-04-06 RX ADMIN — INSULIN GLARGINE 9 UNIT(S): 100 INJECTION, SOLUTION SUBCUTANEOUS at 21:42

## 2018-04-06 RX ADMIN — HEPARIN SODIUM 5000 UNIT(S): 5000 INJECTION INTRAVENOUS; SUBCUTANEOUS at 21:39

## 2018-04-06 RX ADMIN — SEVELAMER CARBONATE 1600 MILLIGRAM(S): 2400 POWDER, FOR SUSPENSION ORAL at 21:40

## 2018-04-06 RX ADMIN — CALCITRIOL 0.25 MICROGRAM(S): 0.5 CAPSULE ORAL at 14:12

## 2018-04-06 NOTE — PROGRESS NOTE BEHAVIORAL HEALTH - AXIS III
Hyperkalemia, ESRD , Hypertension, Diabetes, Hyperlipidemia
Hyperkalemia, ESRD , Hypertension, Diabetes, Hyperlipidemia

## 2018-04-06 NOTE — PROGRESS NOTE BEHAVIORAL HEALTH - NSBHCHARTREVIEWVS_PSY_A_CORE FT
Vital Signs Last 24 Hrs  T(C): 36.7 (05 Apr 2018 13:05), Max: 36.7 (04 Apr 2018 20:27)  T(F): 98.1 (05 Apr 2018 13:05), Max: 98.1 (05 Apr 2018 13:05)  HR: 73 (05 Apr 2018 13:05) (61 - 74)  BP: 101/75 (05 Apr 2018 13:05) (101/75 - 138/78)  BP(mean): --  RR: 18 (05 Apr 2018 13:05) (17 - 18)  SpO2: 100% (05 Apr 2018 05:55) (100% - 100%)
Vital Signs Last 24 Hrs  T(C): 36.6 (06 Apr 2018 13:52), Max: 36.8 (06 Apr 2018 07:03)  T(F): 97.9 (06 Apr 2018 13:52), Max: 98.2 (06 Apr 2018 07:03)  HR: 63 (06 Apr 2018 13:52) (63 - 74)  BP: 96/61 (06 Apr 2018 13:52) (88/52 - 128/66)  BP(mean): --  RR: 17 (06 Apr 2018 13:52) (16 - 17)  SpO2: 97% (06 Apr 2018 13:52) (97% - 99%)

## 2018-04-06 NOTE — PROGRESS NOTE BEHAVIORAL HEALTH - NSBHCHARTREVIEWIMAGING_PSY_A_CORE FT
< from: CT Head No Cont (03.26.18 @ 14:22) >    IMPRESSION:  No acute intracranial hemorrhage, mass or hydrocephalus. No interval   change.    < end of copied text >
< from: CT Head No Cont (03.26.18 @ 14:22) >    IMPRESSION:  No acute intracranial hemorrhage, mass or hydrocephalus. No interval   change.    < end of copied text >

## 2018-04-06 NOTE — PROGRESS NOTE BEHAVIORAL HEALTH - NSBHCONSULTMEDAGITATION_PSY_A_CORE FT
Haldol 0.5mg PO/IV/IM q6h PRN delirium/ agitation.  Hold for QTC > 500.
Haldol 0.5mg PO/IV/IM q6h PRN delirium/ agitation.  Hold for QTC > 500.

## 2018-04-06 NOTE — PROGRESS NOTE BEHAVIORAL HEALTH - NSBHCHARTREVIEWLAB_PSY_A_CORE FT
10.7   8.95  )-----------( 250      ( 06 Apr 2018 06:30 )             34.7   04-06    137  |  88<L>  |  69<H>  ----------------------------<  123<H>  5.4<H>   |  27  |  6.08<H>    Ca    8.5      06 Apr 2018 06:30  Phos  7.9     04-05  Mg     2.5     04-06
11.0   8.54  )-----------( 240      ( 05 Apr 2018 06:37 )             35.3   04-05    135  |  90<L>  |  67<H>  ----------------------------<  123<H>  5.4<H>   |  27  |  6.33<H>    Ca    8.0<L>      05 Apr 2018 06:37  Phos  7.9     04-05  Mg     2.4     04-05

## 2018-04-06 NOTE — PROGRESS NOTE ADULT - PROBLEM SELECTOR PLAN 1
[place]onne by opthalmology, s/p AC paracentesis, cont diamox, cont eye drops,  per ophthalmology no urgent need for sx since her IOP was coming down, eventual vitrectomy as outpt, pt need to f/u as outpt. f/u Ophthalmology note

## 2018-04-06 NOTE — PROGRESS NOTE BEHAVIORAL HEALTH - SUMMARY
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  Pt was at [facility] with her daughter, when a rapid response was called for her.  Found to have elevated K, peaked T's. Admitted to telemetry for monitoring and HD.  Note, pt had a similar episode of acute AMS with rapid response called for her on 3/26.  Pt received urgent HD and was discharged.  Psych consulted for recurrent episodes of AMS.      On exam, pt lethargic, oriented x3.  She presents with waxing and waning confusion c/w delirium.  Pt with numerous factors that could contribute to mental status change.  Recommend Haldol 1mg qHS PRN for delirium.  Also recommend holding Lyrica.  PRNs as below.  Monitor EKG for QTC.  F/U utox, B12, folate, TSH, RPR.  Will follow.
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  Pt was at [facility] with her daughter, when a rapid response was called for her.  Found to have elevated K, peaked T's. Admitted to telemetry for monitoring and HD.  Note, pt had a similar episode of acute AMS with rapid response called for her on 3/26.  Pt received urgent HD and was discharged.  Psych consulted for recurrent episodes of AMS.    PT continues to have some difficulty with concentration.  She is oriented to the hospital and knows her daughter will be discharged soon.  Her Haldol was stopped because of oversedation.  She has not required any prn doses.

## 2018-04-06 NOTE — PROGRESS NOTE PEDS - SUBJECTIVE AND OBJECTIVE BOX
[facility] Ophthalmology Progress Note    S: Pt seen and examined at bedside, reports pain OS comes and goes, vision is intermittently blurry .     ROS:  General (neg), Vision (per HPI), Head and Neck (neg), Pulm (neg), CV (neg), GI (neg),  (neg), Musculoskeletal (neg), Skin/Integ (neg), Neuro (neg), Endocrine (neg), Heme (neg), All/Immuno (neg)    Mood and Affect Appropriate ( x ),  Oriented to Time, Place, and Person x 3 ( x )    Ophthalmology Exam    Visual acuity NLP OD, 20/200  Pupils: PERRL OU, no APD  Ttono: 14 OS  Extraocular movements (EOMs): Full OU, no pain, no diplopia    Pen light exam (PLE)  External:  Flat OU  Lids/Lashes/Lacrimal Ducts: Flat OU    Sclera/Conjunctiva:  W+Q OD, tube shunt superotemporally, W&Q OS  Cornea: 2+ MCE OD, clear OS  Anterior Chamber: superotemporal tube abutting iris OD, FLat OS   Iris:  Flat OU, no NVI  Lens:  NS OU    A/P   37 F h/o HTN, DM and ESRD on HD  p/w diaphoresis and vomiting found to be hyperkalemic, 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 [address] [phone]

## 2018-04-06 NOTE — PROGRESS NOTE ADULT - SUBJECTIVE AND OBJECTIVE BOX
Patient is a 37y old  Female who presents with a chief complaint of Hyperkalemia (01 Apr 2018 09:18)      SUBJECTIVE / OVERNIGHT EVENTS: Pt was seen and examined at 12:15pm, no overnight events noted, Pt reports that her left eye pain is intermittent states "comes and goes" and vision is sometimes blurry, wants to see her daughter at [facility],  no c/o nausea, vomiting or abdominal pain. No other issues reported.       MEDICATIONS  (STANDING):  acetazolamide    Tablet 500 milliGRAM(s) Oral two times a day  artificial  tears Solution 1 Drop(s) Left EYE three times a day  atorvastatin 20 milliGRAM(s) Oral at bedtime  brimonidine 0.2% Ophthalmic Solution 1 Drop(s) Left EYE three times a day  calcitriol   Capsule 0.25 MICROGram(s) Oral daily  calcium acetate 2001 milliGRAM(s) Oral three times a day with meals  dextrose 5%. 1000 milliLiter(s) (50 mL/Hr) IV Continuous <Continuous>  dextrose 50% Injectable 12.5 Gram(s) IV Push once  dextrose 50% Injectable 25 Gram(s) IV Push once  dextrose 50% Injectable 25 Gram(s) IV Push once  docusate sodium 100 milliGRAM(s) Oral three times a day  dorzolamide 2% Ophthalmic Solution 1 Drop(s) Left EYE three times a day  heparin  Injectable 5000 Unit(s) SubCutaneous every 8 hours  insulin glargine Injectable (LANTUS) 9 Unit(s) SubCutaneous at bedtime  insulin lispro (HumaLOG) corrective regimen sliding scale   SubCutaneous three times a day before meals  insulin lispro (HumaLOG) corrective regimen sliding scale   SubCutaneous at bedtime  insulin lispro Injectable (HumaLOG) 15 Unit(s) SubCutaneous three times a day before meals  latanoprost 0.005% Ophthalmic Solution 1 Drop(s) Left EYE at bedtime  metoprolol tartrate 100 milliGRAM(s) Oral two times a day  NIFEdipine XL 30 milliGRAM(s) Oral daily  OXcarbazepine 300 milliGRAM(s) Oral two times a day  pregabalin 100 milliGRAM(s) Oral two times a day  senna 2 Tablet(s) Oral at bedtime  sevelamer hydrochloride 1600 milliGRAM(s) Oral three times a day  timolol 0.5% Solution 1 Drop(s) Left EYE two times a day    MEDICATIONS  (PRN):  acetaminophen   Tablet. 650 milliGRAM(s) Oral every 6 hours PRN mild, moderate pain  dextrose Gel 1 Dose(s) Oral once PRN Blood Glucose LESS THAN 70 milliGRAM(s)/deciliter  glucagon  Injectable 1 milliGRAM(s) IntraMuscular once PRN Glucose LESS THAN 70 milligrams/deciliter  haloperidol     Tablet 2 milliGRAM(s) Oral every 6 hours PRN Agitation  haloperidol    Injectable 2 milliGRAM(s) IV Push every 6 hours PRN Agitation  haloperidol    Injectable 2 milliGRAM(s) IntraMuscular every 6 hours PRN Agitation  oxyCODONE    IR 5 milliGRAM(s) Oral every 6 hours PRN Severe Pain (7 - 10)      Vital Signs Last 24 Hrs  T(C): 36.8 (06 Apr 2018 07:03), Max: 36.8 (05 Apr 2018 15:10)  T(F): 98.2 (06 Apr 2018 07:03), Max: 98.2 (05 Apr 2018 15:10)  HR: 69 (06 Apr 2018 07:03) (69 - 75)  BP: 128/66 (06 Apr 2018 07:03) (88/52 - 128/66)  BP(mean): --  RR: 16 (06 Apr 2018 07:03) (16 - 18)  SpO2: 99% (06 Apr 2018 07:03) (99% - 100%)  CAPILLARY BLOOD GLUCOSE      POCT Blood Glucose.: 95 mg/dL (06 Apr 2018 13:21)  POCT Blood Glucose.: 56 mg/dL (06 Apr 2018 12:46)  POCT Blood Glucose.: 170 mg/dL (06 Apr 2018 09:17)  POCT Blood Glucose.: 99 mg/dL (05 Apr 2018 21:09)  POCT Blood Glucose.: 178 mg/dL (05 Apr 2018 17:37)  POCT Blood Glucose.: 179 mg/dL (05 Apr 2018 15:24)    I&O's Summary    05 Apr 2018 07:01  -  06 Apr 2018 07:00  --------------------------------------------------------  IN: 600 mL / OUT: 1267 mL / NET: -667 mL        PHYSICAL EXAM:  GENERAL: NAD, well-developed  HEENT: left eye exam ( per Ophthalmology)  CHEST/LUNG: Clear to auscultation bilaterally; No wheeze  HEART: Regular rate and rhythm  ABDOMEN: Soft, Nontender, Nondistended  EXTREMITIES:  left leg BKA, Rt LE no edema  PSYCH: calm  NEUROLOGY: AAOx3  Skin: left avf +      LABS:                        10.7   8.95  )-----------( 250      ( 06 Apr 2018 06:30 )             34.7     04-06    137  |  88<L>  |  69<H>  ----------------------------<  123<H>  5.4<H>   |  27  |  6.08<H>    Ca    8.5      06 Apr 2018 06:30  Phos  7.9     04-05  Mg     2.5     04-06                RADIOLOGY & ADDITIONAL TESTS:    Imaging Personally Reviewed:    Consultant(s) Notes Reviewed:      Care Discussed with Consultants/Other Providers:

## 2018-04-06 NOTE — PROGRESS NOTE ADULT - PROBLEM SELECTOR PLAN 3
- Hyperkalemia, dialysis tomorrow, pt counselled on diet to avoid potassium rich foods  - Will need SW evaluation re: transportation issues to and from HD  rpt bmp later today as pt now on diamox

## 2018-04-06 NOTE — PROGRESS NOTE BEHAVIORAL HEALTH - NSBHFUPINTERVALHXFT_PSY_A_CORE
Pt says that the Haldol 1mg at night is too sedating for her.  She was prescribed the Haldol for delirium, but she does not need it at this time.
Pt says she continues to have some confusion.  She is hoping that she and her daughter can go home at the same time.  She says her sister helps her daughter.

## 2018-04-06 NOTE — PROGRESS NOTE BEHAVIORAL HEALTH - NSBHCONSULTFOLLOWAFTERCARE_PSY_A_CORE FT
Pt may f/u as an outpt at the [facility] x [phone]
Pt may f/u as an outpt at the [facility] x [phone]

## 2018-04-07 LAB
BUN SERPL-MCNC: 100 MG/DL — HIGH (ref 7–23)
BUN SERPL-MCNC: 107 MG/DL — HIGH (ref 7–23)
CALCIUM SERPL-MCNC: 7.9 MG/DL — LOW (ref 8.4–10.5)
CALCIUM SERPL-MCNC: 8.1 MG/DL — LOW (ref 8.4–10.5)
CHLORIDE SERPL-SCNC: 90 MMOL/L — LOW (ref 98–107)
CHLORIDE SERPL-SCNC: 92 MMOL/L — LOW (ref 98–107)
CO2 SERPL-SCNC: 22 MMOL/L — SIGNIFICANT CHANGE UP (ref 22–31)
CO2 SERPL-SCNC: 24 MMOL/L — SIGNIFICANT CHANGE UP (ref 22–31)
CREAT SERPL-MCNC: 8.23 MG/DL — HIGH (ref 0.5–1.3)
CREAT SERPL-MCNC: 8.75 MG/DL — HIGH (ref 0.5–1.3)
GLUCOSE SERPL-MCNC: 134 MG/DL — HIGH (ref 70–99)
GLUCOSE SERPL-MCNC: 93 MG/DL — SIGNIFICANT CHANGE UP (ref 70–99)
MAGNESIUM SERPL-MCNC: 2.6 MG/DL — SIGNIFICANT CHANGE UP (ref 1.6–2.6)
PHOSPHATE SERPL-MCNC: 9.3 MG/DL — HIGH (ref 2.5–4.5)
POTASSIUM SERPL-MCNC: 5.9 MMOL/L — HIGH (ref 3.5–5.3)
POTASSIUM SERPL-MCNC: 6.6 MMOL/L — CRITICAL HIGH (ref 3.5–5.3)
POTASSIUM SERPL-SCNC: 5.9 MMOL/L — HIGH (ref 3.5–5.3)
POTASSIUM SERPL-SCNC: 6.6 MMOL/L — CRITICAL HIGH (ref 3.5–5.3)
SODIUM SERPL-SCNC: 133 MMOL/L — LOW (ref 135–145)
SODIUM SERPL-SCNC: 139 MMOL/L — SIGNIFICANT CHANGE UP (ref 135–145)
VIT D25+D1,25 OH+D1,25 PNL SERPL-MCNC: 8.4 PG/ML — LOW (ref 19.9–79.3)

## 2018-04-07 PROCEDURE — 99233 SBSQ HOSP IP/OBS HIGH 50: CPT

## 2018-04-07 PROCEDURE — 93010 ELECTROCARDIOGRAM REPORT: CPT | Mod: 76

## 2018-04-07 PROCEDURE — 90935 HEMODIALYSIS ONE EVALUATION: CPT | Mod: GC

## 2018-04-07 RX ORDER — ACETAMINOPHEN 500 MG
650 TABLET ORAL EVERY 6 HOURS
Refills: 0 | Status: DISCONTINUED | OUTPATIENT
Start: 2018-04-07 | End: 2018-04-08

## 2018-04-07 RX ORDER — DEXTROSE 50 % IN WATER 50 %
50 SYRINGE (ML) INTRAVENOUS ONCE
Refills: 0 | Status: COMPLETED | OUTPATIENT
Start: 2018-04-07 | End: 2018-04-07

## 2018-04-07 RX ORDER — INSULIN HUMAN 100 [IU]/ML
10 INJECTION, SOLUTION SUBCUTANEOUS ONCE
Refills: 0 | Status: COMPLETED | OUTPATIENT
Start: 2018-04-07 | End: 2018-04-07

## 2018-04-07 RX ORDER — OXYCODONE AND ACETAMINOPHEN 5; 325 MG/1; MG/1
1 TABLET ORAL EVERY 6 HOURS
Refills: 0 | Status: DISCONTINUED | OUTPATIENT
Start: 2018-04-07 | End: 2018-04-07

## 2018-04-07 RX ADMIN — BRIMONIDINE TARTRATE 1 DROP(S): 2 SOLUTION/ DROPS OPHTHALMIC at 05:40

## 2018-04-07 RX ADMIN — OXYCODONE HYDROCHLORIDE 5 MILLIGRAM(S): 5 TABLET ORAL at 22:15

## 2018-04-07 RX ADMIN — SENNA PLUS 2 TABLET(S): 8.6 TABLET ORAL at 22:33

## 2018-04-07 RX ADMIN — HEPARIN SODIUM 5000 UNIT(S): 5000 INJECTION INTRAVENOUS; SUBCUTANEOUS at 22:33

## 2018-04-07 RX ADMIN — Medication 100 MILLIGRAM(S): at 05:39

## 2018-04-07 RX ADMIN — Medication 15 UNIT(S): at 13:36

## 2018-04-07 RX ADMIN — ATORVASTATIN CALCIUM 20 MILLIGRAM(S): 80 TABLET, FILM COATED ORAL at 22:32

## 2018-04-07 RX ADMIN — Medication 100 MILLIGRAM(S): at 18:50

## 2018-04-07 RX ADMIN — OXYCODONE HYDROCHLORIDE 5 MILLIGRAM(S): 5 TABLET ORAL at 14:30

## 2018-04-07 RX ADMIN — SEVELAMER CARBONATE 1600 MILLIGRAM(S): 2400 POWDER, FOR SUSPENSION ORAL at 13:37

## 2018-04-07 RX ADMIN — OXCARBAZEPINE 300 MILLIGRAM(S): 300 TABLET, FILM COATED ORAL at 18:52

## 2018-04-07 RX ADMIN — Medication 100 MILLIGRAM(S): at 13:37

## 2018-04-07 RX ADMIN — DORZOLAMIDE HYDROCHLORIDE 1 DROP(S): 20 SOLUTION/ DROPS OPHTHALMIC at 22:33

## 2018-04-07 RX ADMIN — LATANOPROST 1 DROP(S): 0.05 SOLUTION/ DROPS OPHTHALMIC; TOPICAL at 22:33

## 2018-04-07 RX ADMIN — Medication 1 DROP(S): at 13:37

## 2018-04-07 RX ADMIN — Medication 50 MILLILITER(S): at 02:34

## 2018-04-07 RX ADMIN — DORZOLAMIDE HYDROCHLORIDE 1 DROP(S): 20 SOLUTION/ DROPS OPHTHALMIC at 05:39

## 2018-04-07 RX ADMIN — Medication 1 DROP(S): at 22:32

## 2018-04-07 RX ADMIN — DORZOLAMIDE HYDROCHLORIDE 1 DROP(S): 20 SOLUTION/ DROPS OPHTHALMIC at 13:37

## 2018-04-07 RX ADMIN — Medication 2001 MILLIGRAM(S): at 13:37

## 2018-04-07 RX ADMIN — Medication 2001 MILLIGRAM(S): at 18:50

## 2018-04-07 RX ADMIN — HEPARIN SODIUM 5000 UNIT(S): 5000 INJECTION INTRAVENOUS; SUBCUTANEOUS at 13:37

## 2018-04-07 RX ADMIN — INSULIN HUMAN 10 UNIT(S): 100 INJECTION, SOLUTION SUBCUTANEOUS at 02:34

## 2018-04-07 RX ADMIN — ACETAZOLAMIDE 500 MILLIGRAM(S): 250 TABLET ORAL at 05:39

## 2018-04-07 RX ADMIN — Medication 100 MILLIGRAM(S): at 05:43

## 2018-04-07 RX ADMIN — Medication 4: at 13:36

## 2018-04-07 RX ADMIN — OXCARBAZEPINE 300 MILLIGRAM(S): 300 TABLET, FILM COATED ORAL at 05:45

## 2018-04-07 RX ADMIN — SEVELAMER CARBONATE 1600 MILLIGRAM(S): 2400 POWDER, FOR SUSPENSION ORAL at 10:43

## 2018-04-07 RX ADMIN — SEVELAMER CARBONATE 1600 MILLIGRAM(S): 2400 POWDER, FOR SUSPENSION ORAL at 22:33

## 2018-04-07 RX ADMIN — HEPARIN SODIUM 5000 UNIT(S): 5000 INJECTION INTRAVENOUS; SUBCUTANEOUS at 05:39

## 2018-04-07 RX ADMIN — BRIMONIDINE TARTRATE 1 DROP(S): 2 SOLUTION/ DROPS OPHTHALMIC at 22:33

## 2018-04-07 RX ADMIN — ACETAZOLAMIDE 500 MILLIGRAM(S): 250 TABLET ORAL at 18:51

## 2018-04-07 RX ADMIN — Medication 1 DROP(S): at 05:39

## 2018-04-07 RX ADMIN — Medication 15 UNIT(S): at 18:49

## 2018-04-07 RX ADMIN — OXYCODONE HYDROCHLORIDE 5 MILLIGRAM(S): 5 TABLET ORAL at 23:05

## 2018-04-07 RX ADMIN — Medication 30 MILLIGRAM(S): at 10:42

## 2018-04-07 RX ADMIN — Medication 100 MILLIGRAM(S): at 22:33

## 2018-04-07 RX ADMIN — Medication 100 MILLIGRAM(S): at 10:42

## 2018-04-07 RX ADMIN — HALOPERIDOL DECANOATE 2 MILLIGRAM(S): 100 INJECTION INTRAMUSCULAR at 13:38

## 2018-04-07 RX ADMIN — Medication 1 DROP(S): at 18:51

## 2018-04-07 RX ADMIN — Medication 1 DROP(S): at 05:40

## 2018-04-07 RX ADMIN — BRIMONIDINE TARTRATE 1 DROP(S): 2 SOLUTION/ DROPS OPHTHALMIC at 13:39

## 2018-04-07 RX ADMIN — OXYCODONE HYDROCHLORIDE 5 MILLIGRAM(S): 5 TABLET ORAL at 13:37

## 2018-04-07 RX ADMIN — Medication 2001 MILLIGRAM(S): at 10:43

## 2018-04-07 RX ADMIN — Medication 15 UNIT(S): at 09:32

## 2018-04-07 NOTE — PROGRESS NOTE ADULT - ATTENDING COMMENTS
Patient examined and ROS reviewed. A patient of renal failure examined during dialysis. Patient is tolerating dialysis well. Blood flow through access is adequate. Advised to continue UF.

## 2018-04-07 NOTE — PROGRESS NOTE ADULT - PROBLEM SELECTOR PLAN 1
Routine HD today to keep on TTS schedule.  patient on 1K bath in the setting of hyperkalemia.  Last HD 4/5 3.5 hours 667 cc UF tolerated well.

## 2018-04-07 NOTE — PROGRESS NOTE ADULT - SUBJECTIVE AND OBJECTIVE BOX
CC: Patient is a 37y old  Female who presents with a chief complaint of Hyperkalemia (01 Apr 2018 09:18)    SUBJECTIVE / OVERNIGHT EVENTS: Patient seen after HD session AM. Experiencing chest pain typical of what she has after dialysis requesting tylenol for pain relief. Otherwise she denies headache, weakness, malaise, fevers, chills, visual changes, dyspnea, cough, abdominal pain, nausea, vomiting, diarrhea, constipation, dysuria, hematuria, polyuria, pruritis, joint pain    MEDICATIONS  (STANDING):  acetazolamide    Tablet 500 milliGRAM(s) Oral two times a day  artificial  tears Solution 1 Drop(s) Left EYE three times a day  atorvastatin 20 milliGRAM(s) Oral at bedtime  brimonidine 0.2% Ophthalmic Solution 1 Drop(s) Left EYE three times a day  calcium acetate 2001 milliGRAM(s) Oral three times a day with meals  dextrose 5%. 1000 milliLiter(s) (50 mL/Hr) IV Continuous <Continuous>  dextrose 50% Injectable 12.5 Gram(s) IV Push once  dextrose 50% Injectable 25 Gram(s) IV Push once  dextrose 50% Injectable 25 Gram(s) IV Push once  docusate sodium 100 milliGRAM(s) Oral three times a day  dorzolamide 2% Ophthalmic Solution 1 Drop(s) Left EYE three times a day  heparin  Injectable 5000 Unit(s) SubCutaneous every 8 hours  insulin glargine Injectable (LANTUS) 9 Unit(s) SubCutaneous at bedtime  insulin lispro (HumaLOG) corrective regimen sliding scale   SubCutaneous three times a day before meals  insulin lispro (HumaLOG) corrective regimen sliding scale   SubCutaneous at bedtime  insulin lispro Injectable (HumaLOG) 15 Unit(s) SubCutaneous three times a day before meals  latanoprost 0.005% Ophthalmic Solution 1 Drop(s) Left EYE at bedtime  metoprolol tartrate 100 milliGRAM(s) Oral two times a day  NIFEdipine XL 30 milliGRAM(s) Oral daily  OXcarbazepine 300 milliGRAM(s) Oral two times a day  pregabalin 100 milliGRAM(s) Oral two times a day  senna 2 Tablet(s) Oral at bedtime  sevelamer hydrochloride 1600 milliGRAM(s) Oral three times a day  timolol 0.5% Solution 1 Drop(s) Left EYE two times a day    MEDICATIONS  (PRN):  acetaminophen   Tablet. 650 milliGRAM(s) Oral every 6 hours PRN mild, moderate pain  dextrose Gel 1 Dose(s) Oral once PRN Blood Glucose LESS THAN 70 milliGRAM(s)/deciliter  glucagon  Injectable 1 milliGRAM(s) IntraMuscular once PRN Glucose LESS THAN 70 milligrams/deciliter  haloperidol     Tablet 2 milliGRAM(s) Oral every 6 hours PRN Agitation  haloperidol    Injectable 2 milliGRAM(s) IV Push every 6 hours PRN Agitation  haloperidol    Injectable 2 milliGRAM(s) IntraMuscular every 6 hours PRN Agitation  oxyCODONE    5 mG/acetaminophen 325 mG 1 Tablet(s) Oral every 6 hours PRN Severe Pain (7 - 10)  oxyCODONE    IR 5 milliGRAM(s) Oral every 6 hours PRN Severe Pain (7 - 10)      Vital Signs Last 24 Hrs  T(C): 36.4 (07 Apr 2018 13:11), Max: 37 (07 Apr 2018 05:49)  T(F): 97.6 (07 Apr 2018 13:11), Max: 98.6 (07 Apr 2018 05:49)  HR: 71 (07 Apr 2018 13:11) (64 - 73)  BP: 134/76 (07 Apr 2018 13:11) (111/65 - 134/76)  BP(mean): --  RR: 18 (07 Apr 2018 13:11) (16 - 18)  SpO2: 100% (07 Apr 2018 13:11) (98% - 100%)  CAPILLARY BLOOD GLUCOSE      POCT Blood Glucose.: 204 mg/dL (07 Apr 2018 12:40)  POCT Blood Glucose.: 124 mg/dL (07 Apr 2018 08:50)  POCT Blood Glucose.: 94 mg/dL (07 Apr 2018 04:44)  POCT Blood Glucose.: 74 mg/dL (06 Apr 2018 21:36)  POCT Blood Glucose.: 104 mg/dL (06 Apr 2018 17:45)        PHYSICAL EXAM:  GENERAL: NAD, well-developed  HEAD:  Atraumatic, Normocephalic  EYES: EOMI, PERRLA, conjunctiva and sclera clear  NECK: Supple, No JVD  CHEST/LUNG: Clear to auscultation bilaterally; No wheeze  HEART: Regular rate and rhythm; No murmurs, rubs, or gallops  ABDOMEN: Soft, Nontender, Nondistended; Bowel sounds present  EXTREMITIES:  2+ Peripheral Pulses, No clubbing, cyanosis, or edema  PSYCH: AAOx3  NEUROLOGY: non-focal  SKIN: No rashes or lesions    LABS:                        10.7   8.95  )-----------( 250      ( 06 Apr 2018 06:30 )             34.7     04-07    139  |  92<L>  |  107<H>  ----------------------------<  134<H>  5.9<H>   |  22  |  8.75<H>    Ca    7.9<L>      07 Apr 2018 06:35  Phos  9.3     04-07  Mg     2.6     04-07                RADIOLOGY & ADDITIONAL TESTS:    Imaging Personally Reviewed:    Consultant(s) Notes Reviewed:      Care Discussed with Consultants/Other Providers: CC: Patient is a 37y old  Female who presents with a chief complaint of Hyperkalemia (01 Apr 2018 09:18)    SUBJECTIVE / OVERNIGHT EVENTS: Patient seen after HD session AM. Experiencing chest pain typical of what she has after dialysis requesting tylenol for pain relief. Otherwise she denies headache, weakness, malaise, fevers, chills, visual changes, dyspnea, cough, abdominal pain, nausea, vomiting, diarrhea, constipation, dysuria, hematuria, polyuria, pruritis, joint pain    MEDICATIONS  (STANDING):  acetazolamide    Tablet 500 milliGRAM(s) Oral two times a day  artificial  tears Solution 1 Drop(s) Left EYE three times a day  atorvastatin 20 milliGRAM(s) Oral at bedtime  brimonidine 0.2% Ophthalmic Solution 1 Drop(s) Left EYE three times a day  calcium acetate 2001 milliGRAM(s) Oral three times a day with meals  dextrose 5%. 1000 milliLiter(s) (50 mL/Hr) IV Continuous <Continuous>  dextrose 50% Injectable 12.5 Gram(s) IV Push once  dextrose 50% Injectable 25 Gram(s) IV Push once  dextrose 50% Injectable 25 Gram(s) IV Push once  docusate sodium 100 milliGRAM(s) Oral three times a day  dorzolamide 2% Ophthalmic Solution 1 Drop(s) Left EYE three times a day  heparin  Injectable 5000 Unit(s) SubCutaneous every 8 hours  insulin glargine Injectable (LANTUS) 9 Unit(s) SubCutaneous at bedtime  insulin lispro (HumaLOG) corrective regimen sliding scale   SubCutaneous three times a day before meals  insulin lispro (HumaLOG) corrective regimen sliding scale   SubCutaneous at bedtime  insulin lispro Injectable (HumaLOG) 15 Unit(s) SubCutaneous three times a day before meals  latanoprost 0.005% Ophthalmic Solution 1 Drop(s) Left EYE at bedtime  metoprolol tartrate 100 milliGRAM(s) Oral two times a day  NIFEdipine XL 30 milliGRAM(s) Oral daily  OXcarbazepine 300 milliGRAM(s) Oral two times a day  pregabalin 100 milliGRAM(s) Oral two times a day  senna 2 Tablet(s) Oral at bedtime  sevelamer hydrochloride 1600 milliGRAM(s) Oral three times a day  timolol 0.5% Solution 1 Drop(s) Left EYE two times a day    MEDICATIONS  (PRN):  acetaminophen   Tablet. 650 milliGRAM(s) Oral every 6 hours PRN mild, moderate pain  dextrose Gel 1 Dose(s) Oral once PRN Blood Glucose LESS THAN 70 milliGRAM(s)/deciliter  glucagon  Injectable 1 milliGRAM(s) IntraMuscular once PRN Glucose LESS THAN 70 milligrams/deciliter  haloperidol     Tablet 2 milliGRAM(s) Oral every 6 hours PRN Agitation  haloperidol    Injectable 2 milliGRAM(s) IV Push every 6 hours PRN Agitation  haloperidol    Injectable 2 milliGRAM(s) IntraMuscular every 6 hours PRN Agitation  oxyCODONE    5 mG/acetaminophen 325 mG 1 Tablet(s) Oral every 6 hours PRN Severe Pain (7 - 10)  oxyCODONE    IR 5 milliGRAM(s) Oral every 6 hours PRN Severe Pain (7 - 10)      Vital Signs Last 24 Hrs  T(C): 36.4 (07 Apr 2018 13:11), Max: 37 (07 Apr 2018 05:49)  T(F): 97.6 (07 Apr 2018 13:11), Max: 98.6 (07 Apr 2018 05:49)  HR: 71 (07 Apr 2018 13:11) (64 - 73)  BP: 134/76 (07 Apr 2018 13:11) (111/65 - 134/76)  BP(mean): --  RR: 18 (07 Apr 2018 13:11) (16 - 18)  SpO2: 100% (07 Apr 2018 13:11) (98% - 100%)  CAPILLARY BLOOD GLUCOSE      POCT Blood Glucose.: 204 mg/dL (07 Apr 2018 12:40)  POCT Blood Glucose.: 124 mg/dL (07 Apr 2018 08:50)  POCT Blood Glucose.: 94 mg/dL (07 Apr 2018 04:44)  POCT Blood Glucose.: 74 mg/dL (06 Apr 2018 21:36)  POCT Blood Glucose.: 104 mg/dL (06 Apr 2018 17:45)        PHYSICAL EXAM:  GENERAL: NAD  HEAD:  Atraumatic, Normocephalic  EYES: EOMI, PERRLA, conjunctiva and sclera clear  NECK: Supple, No JVD  CHEST/LUNG: Clear to auscultation bilaterally; No wheeze  HEART: Regular rate and rhythm; No murmurs, rubs, or gallops  ABDOMEN: Soft, Nontender, Nondistended; Bowel sounds present, obese  EXTREMITIES:  2+ Peripheral Pulses, No clubbing, cyanosis, or edema, left leg BKA  PSYCH: AAOx3  NEUROLOGY: non-focal  SKIN: No rashes or lesions    LABS:                        10.7   8.95  )-----------( 250      ( 06 Apr 2018 06:30 )             34.7     04-07    139  |  92<L>  |  107<H>  ----------------------------<  134<H>  5.9<H>   |  22  |  8.75<H>    Ca    7.9<L>      07 Apr 2018 06:35  Phos  9.3     04-07  Mg     2.6     04-07                RADIOLOGY & ADDITIONAL TESTS:    Imaging Personally Reviewed:    Consultant(s) Notes Reviewed:      Care Discussed with Consultants/Other Providers:

## 2018-04-07 NOTE — CHART NOTE - NSCHARTNOTEFT_GEN_A_CORE
Source: Patient [ X]    Family [ ]     other [ X] electronic chart     Diet : DASH/TLC (cholesterol and sodium restricted), Consistent carbohydrate (no snacks), Low fat, Renal Replacement - No Protein Restr, No Conc K, No Conc Phos, Low Sodium (RENAL)     Current Weight: Post HD (4/5) 91kg/200.6lbs, Last RDN note 3/30/18-dry weight noted to be 90kg/198.4lbs    Initial Dietitian Evaluation 2/2 to extended length of stay/Nutrition consult x Assessment/Edu. She was recently seen by RDN 3/30/18 and therapeutic diet recommendations was provided per RDN note. Met with patient at bedside. Reports good appetite and PO intake. Patient denies any nausea/vomiting/diarrhea/constipation or difficulty chewing and swallowing.  Reinforced Consistent Carbohydrate, Renal diet recommendations. Written materials provided.       Pertinent Medications: acetazolamide    Tablet  atorvastatin  calcium acetate  dextrose 5%.  dextrose 50% Injectable  dextrose 50% Injectable  dextrose 50% Injectable  docusate sodium  insulin glargine Injectable (LANTUS)  insulin lispro (HumaLOG) corrective regimen sliding scale  insulin lispro (HumaLOG) corrective regimen sliding scale  insulin lispro Injectable (HumaLOG)  metoprolol tartrate  NIFEdipine XL  OXcarbazepine  pregabalin  senna    Pertinent Labs:  04-07 Na139 mmol/L Glu 134 mg/dL<H> K+ 5.9 mmol/L<H> Cr  8.75 mg/dL<H>  mg/dL<H> 04-07 Phos 9.3 mg/dL<H> 03-27 [id number] 7.7 %<H>    Skin: intact    Estimated Needs:   [ X] no change since previous assessment  [ ] recalculated:     Previous Nutrition Diagnosis:   [X] Altered nutrition - related laboratory values (specify)   Nutrition Diagnosis is [ X] ongoing  [ ] resolved [ ] not applicable     Additional Recommendations:  1. Recommend Liberalize diet to Consistent Carbohydrate (no snack), Renal Replacement - No Protein Restr, No Conc K, No Conc Phos, Low Sodium (RENAL), please d/c DASH/TLC (cholesterol and sodium restricted), Low fat diet  2. Monitor weights, labs, BM's, skin integrity, p.o. intake.  3. Please Encourage po intake, assist with meals and menu selections, provide alternatives PRN.    4. Suggest outpatient follow up with an endocrinologist to ensure long-term DM diet comprehension and compliance. Suggest outpatient follow up with appropriate RD for the purposes of long-term nutrition evaluation and diet education.

## 2018-04-07 NOTE — PROGRESS NOTE ADULT - PROBLEM SELECTOR PLAN 1
Followed by opthalmology, s/p AC paracentesis, cont diamox, cont eye drops,  per ophthalmology no urgent need for sx since her IOP was coming down, eventual vitrectomy as outpt, pt need to f/u as outpt. f/u Ophthalmology note

## 2018-04-07 NOTE — PROGRESS NOTE ADULT - SUBJECTIVE AND OBJECTIVE BOX
[facility] DIVISION OF KIDNEY DISEASES AND HYPERTENSION -- HEMODIALYSIS NOTE  --------------------------------------------------------------------------------  Dayron Ayala     --------------------------------------------------------------------------------  Chief Complaint: ESRD/Ongoing hemodialysis requirement    24 hour events/subjective:    K 6.6  EKG showed no peaked T-waves  Phos 9.3      PAST HISTORY  --------------------------------------------------------------------------------  No significant changes to PMH, PSH, FHx, SHx, unless otherwise noted    ALLERGIES & MEDICATIONS  --------------------------------------------------------------------------------  Allergies    No Known Allergies    Intolerances      Standing Inpatient Medications  acetazolamide    Tablet 500 milliGRAM(s) Oral two times a day  artificial  tears Solution 1 Drop(s) Left EYE three times a day  atorvastatin 20 milliGRAM(s) Oral at bedtime  brimonidine 0.2% Ophthalmic Solution 1 Drop(s) Left EYE three times a day  calcitriol   Capsule 0.25 MICROGram(s) Oral daily  calcium acetate 2001 milliGRAM(s) Oral three times a day with meals  dextrose 5%. 1000 milliLiter(s) IV Continuous <Continuous>  dextrose 50% Injectable 12.5 Gram(s) IV Push once  dextrose 50% Injectable 25 Gram(s) IV Push once  dextrose 50% Injectable 25 Gram(s) IV Push once  docusate sodium 100 milliGRAM(s) Oral three times a day  dorzolamide 2% Ophthalmic Solution 1 Drop(s) Left EYE three times a day  heparin  Injectable 5000 Unit(s) SubCutaneous every 8 hours  insulin glargine Injectable (LANTUS) 9 Unit(s) SubCutaneous at bedtime  insulin lispro (HumaLOG) corrective regimen sliding scale   SubCutaneous three times a day before meals  insulin lispro (HumaLOG) corrective regimen sliding scale   SubCutaneous at bedtime  insulin lispro Injectable (HumaLOG) 15 Unit(s) SubCutaneous three times a day before meals  latanoprost 0.005% Ophthalmic Solution 1 Drop(s) Left EYE at bedtime  metoprolol tartrate 100 milliGRAM(s) Oral two times a day  NIFEdipine XL 30 milliGRAM(s) Oral daily  OXcarbazepine 300 milliGRAM(s) Oral two times a day  pregabalin 100 milliGRAM(s) Oral two times a day  senna 2 Tablet(s) Oral at bedtime  sevelamer hydrochloride 1600 milliGRAM(s) Oral three times a day  timolol 0.5% Solution 1 Drop(s) Left EYE two times a day    PRN Inpatient Medications  acetaminophen   Tablet. 650 milliGRAM(s) Oral every 6 hours PRN  dextrose Gel 1 Dose(s) Oral once PRN  glucagon  Injectable 1 milliGRAM(s) IntraMuscular once PRN  haloperidol     Tablet 2 milliGRAM(s) Oral every 6 hours PRN  haloperidol    Injectable 2 milliGRAM(s) IV Push every 6 hours PRN  haloperidol    Injectable 2 milliGRAM(s) IntraMuscular every 6 hours PRN  oxyCODONE    IR 5 milliGRAM(s) Oral every 6 hours PRN      REVIEW OF SYSTEMS  --------------------------------------------------------------------------------  Constitutional: [ ] Fever [ ] Chills [ ] Fatigue [ ] Weight change   HEENT: [ ] Blurred vision [ ] Eye Pain [ ] Headache [ ] Runny nose [ ] Sore Throat   Respiratory: [ ] Cough [ ] Wheezing [ ] Shortness of breath  Cardiovascular: [ ] Chest Pain [ ] Palpitations [ ] TORRES [ ] PND [ ] Orthopnea  Gastrointestinal: [ ] Abdominal Pain [ ] Diarrhea [ ] Constipation [ ] Hemorrhoids [ ] Nausea [ ] Vomiting  Genitourinary: [ ] Nocturia [ ] Dysuria [ ] Incontinence  Extremities: [ ] Swelling [ ] Joint Pain  Neurologic: [ ] Focal deficit [ ] Paresthesias [ ] Syncope  Lymphatic: [ ] Swelling [ ] Lymphadenopathy   Skin: [ ] Rash [ ] Ecchymoses [ ] Wounds [ ] Lesions  Psychiatry: [ ] Depression [ ] Suicidal/Homicidal Ideation [ ] Anxiety [ ] Sleep Disturbances  [ ] 10 point review of systems is otherwise negative except as mentioned above              [x ]Unable to obtain  All other systems were reviewed and are negative, except as noted.    VITALS/PHYSICAL EXAM  --------------------------------------------------------------------------------  T(C): 36.6 (18 @ 06:40), Max: 37 (18 @ 05:49)  HR: 69 (18 @ 06:40) (63 - 69)  BP: 118/79 (18 @ 06:40) (96/61 - 120/69)  RR: 16 (18 @ 06:40) (16 - 18)  SpO2: 100% (18 @ 05:49) (97% - 100%)  Wt(kg): --      Daily     Daily Weight in k.1 (2018 06:40)  I&O's Summary          Physical Exam:  	Gen: NAD   	HEENT: anicteric  	Pulm: CTA B/L   	CV: RRR  	Back: No dependent edema  	Abd: soft, nontender, nondistended  	: No gunter  	LE: Warm, no edema  	Neuro: no asterixis  	Skin: Warm, without rashes  	Vascular access: L arm AVF    LABS/STUDIES  --------------------------------------------------------------------------------              10.7   8.95  >-----------<  250      [18:30]              34.7     139  |  92  |  107  ----------------------------<  134      [18 06:35]  5.9   |  22  |  8.75        Ca     7.9     [18 06:35]      Mg     2.6     [18 @ 01:13]      Phos  9.3     [18 06:35]      .9 (Ca --)      [18:30]   --  .5 (Ca --)      [18 @ 06:00]   --  HbA1c 7.7      [18 @ 09:20]    HBsAb 31.9      [18 @ 16:50]  HBsAg NEGATIVE      [18 @ 16:50]  HBcAb Reactive      [18 16:50]  HCV 0.20, Nonreactive Hepatitis C AB  S/CO Ratio                        Interpretation  < 1.0                                     Non-Reactive  1.0 - 4.9                           Weakly-Reactive  > 5.0                                 Reactive  Non-Reactive: [place]erson with a non-reactive HCV antibody  result is considered uninfected.  No further action is  needed unless recent infection is suspected.  In these  cases, consider repeat testing later to detect  seroconversion..  Weakly-Reactive: HCV antibody test is abnormal, HCV RNA  Qualitative test will follow.  Reactive: HCV antibody test is abnormal, HCV RNA  Qualitative test will follow.  Note: HCV antibody testing is performed on the [employer]   system.      [18 @ 16:50]        Radiology  --------------------------------------------------------------------------------    --------------------------------------------------------------------------------  Dayron [facility]   [phone]

## 2018-04-07 NOTE — CHART NOTE - NSCHARTNOTEFT_GEN_A_CORE
Tele PA note:    K 6.6  Supposed to have dialysis in AM  no complaints  VSS  EKG showed no peaked T-waves  Given Insulin 10units with Dextrose 1apm  f/u AM BMP

## 2018-04-07 NOTE — PROGRESS NOTE ADULT - PROBLEM SELECTOR PLAN 2
Likely secondary to dietary indiscretion as noted by elevated phos  Recirc study negative.  Timolol and heparin can exacerbate hyperkalemia would consider alternatives if possible.

## 2018-04-07 NOTE — PROGRESS NOTE ADULT - PROBLEM SELECTOR PLAN 3
Phos also elevated likely secondary to dietary indiscretion.  Cont Ca acetate  Add sevelamer 1600 mg PO TID  Will stop calcitriol as phos is elevated and PTH is 167  can start vit d 25.

## 2018-04-07 NOTE — PROGRESS NOTE ADULT - SUBJECTIVE AND OBJECTIVE BOX
[facility] DIVISION OF KIDNEY DISEASES AND HYPERTENSION -- HEMODIALYSIS NOTE  --------------------------------------------------------------------------------  Chief Complaint: ESRD/Ongoing hemodialysis requirement    24 hour events/subjective:        PAST HISTORY  --------------------------------------------------------------------------------  No significant changes to PMH, PSH, FHx, SHx, unless otherwise noted    ALLERGIES & MEDICATIONS  --------------------------------------------------------------------------------  Allergies    No Known Allergies    Intolerances      Standing Inpatient Medications  acetazolamide    Tablet 500 milliGRAM(s) Oral two times a day  artificial  tears Solution 1 Drop(s) Left EYE three times a day  atorvastatin 20 milliGRAM(s) Oral at bedtime  brimonidine 0.2% Ophthalmic Solution 1 Drop(s) Left EYE three times a day  calcium acetate 2001 milliGRAM(s) Oral three times a day with meals  dextrose 5%. 1000 milliLiter(s) IV Continuous <Continuous>  dextrose 50% Injectable 12.5 Gram(s) IV Push once  dextrose 50% Injectable 25 Gram(s) IV Push once  dextrose 50% Injectable 25 Gram(s) IV Push once  docusate sodium 100 milliGRAM(s) Oral three times a day  dorzolamide 2% Ophthalmic Solution 1 Drop(s) Left EYE three times a day  heparin  Injectable 5000 Unit(s) SubCutaneous every 8 hours  insulin glargine Injectable (LANTUS) 9 Unit(s) SubCutaneous at bedtime  insulin lispro (HumaLOG) corrective regimen sliding scale   SubCutaneous three times a day before meals  insulin lispro (HumaLOG) corrective regimen sliding scale   SubCutaneous at bedtime  insulin lispro Injectable (HumaLOG) 15 Unit(s) SubCutaneous three times a day before meals  latanoprost 0.005% Ophthalmic Solution 1 Drop(s) Left EYE at bedtime  metoprolol tartrate 100 milliGRAM(s) Oral two times a day  NIFEdipine XL 30 milliGRAM(s) Oral daily  OXcarbazepine 300 milliGRAM(s) Oral two times a day  pregabalin 100 milliGRAM(s) Oral two times a day  senna 2 Tablet(s) Oral at bedtime  sevelamer hydrochloride 1600 milliGRAM(s) Oral three times a day  timolol 0.5% Solution 1 Drop(s) Left EYE two times a day    PRN Inpatient Medications  acetaminophen   Tablet. 650 milliGRAM(s) Oral every 6 hours PRN  dextrose Gel 1 Dose(s) Oral once PRN  glucagon  Injectable 1 milliGRAM(s) IntraMuscular once PRN  haloperidol     Tablet 2 milliGRAM(s) Oral every 6 hours PRN  haloperidol    Injectable 2 milliGRAM(s) IV Push every 6 hours PRN  haloperidol    Injectable 2 milliGRAM(s) IntraMuscular every 6 hours PRN  oxyCODONE    IR 5 milliGRAM(s) Oral every 6 hours PRN      REVIEW OF SYSTEMS  --------------------------------------------------------------------------------  Gen: No weight changes, fatigue, fevers/chills, weakness  Skin: No rashes  Head/Eyes/Ears/Mouth: No headache; Normal hearing; Normal vision w/o blurriness; No sinus pain/discomfort, sore throat  Respiratory: No dyspnea, cough, wheezing, hemoptysis  CV: No chest pain, PND, orthopnea  GI: No abdominal pain, diarrhea, constipation, nausea, vomiting, melena, hematochezia  : No increased frequency, dysuria, hematuria, nocturia  MSK: No joint pain/swelling; no back pain; no edema  Neuro: No dizziness/lightheadedness, weakness, seizures, numbness, tingling  Heme: No easy bruising or bleeding  Endo: No heat/cold intolerance  Psych: No significant nervousness, anxiety, stress, depression    All other systems were reviewed and are negative, except as noted.    VITALS/PHYSICAL EXAM  --------------------------------------------------------------------------------  T(C): 36.6 (04-07-18 @ 06:40), Max: 37 (04-07-18 @ 05:49)  HR: 69 (04-07-18 @ 06:40) (63 - 69)  BP: 118/79 (04-07-18 @ 06:40) (96/61 - 120/69)  RR: 16 (04-07-18 @ 06:40) (16 - 18)  SpO2: 100% (04-07-18 @ 05:49) (97% - 100%)  Wt(kg): --        Physical Exam:  	Gen: NAD, well-appearing  	HEENT: PERRL, supple neck, clear oropharynx  	Pulm: CTA B/L  	CV: RRR, S1S2; no rub  	Back: No spinal or CVA tenderness; no sacral edema  	Abd: +BS, soft, nontender/nondistended  	: No suprapubic tenderness  	UE: Warm, FROM, no clubbing, intact strength; no edema; no asterixis  	LE: Warm, FROM, no clubbing, intact strength; no edema  	Neuro: No focal deficits, intact gait  	Psych: Normal affect and mood  	Skin: Warm, without rashes  	Vascular access:    LABS/STUDIES  --------------------------------------------------------------------------------              10.7   8.95  >-----------<  250      [04-06-18 @ 06:30]              34.7     139  |  92  |  107  ----------------------------<  134      [04-07-18 @ 06:35]  5.9   |  22  |  8.75        Ca     7.9     [04-07-18 @ 06:35]      Mg     2.6     [04-07-18 @ 01:13]      Phos  9.3     [04-07-18 @ 06:35]            .9 (Ca --)      [04-06-18 @ 06:30]   --  .5 (Ca --)      [03-30-18 @ 06:00]   --  HbA1c 7.7      [03-27-18 @ 09:20]    HBsAb 31.9      [03-26-18 @ 16:50]  HBsAg NEGATIVE      [03-26-18 @ 16:50]  HBcAb Reactive      [03-26-18 @ 16:50]  HCV 0.20, Nonreactive Hepatitis C AB  S/CO Ratio                        Interpretation  < 1.0                                     Non-Reactive  1.0 - 4.9                           Weakly-Reactive  > 5.0                                 Reactive  Non-Reactive: Aperson with a non-reactive HCV antibody  result is considered uninfected.  No further action is  needed unless recent infection is suspected.  In these  cases, consider repeat testing later to detect  seroconversion..  Weakly-Reactive: HCV antibody test is abnormal, HCV RNA  Qualitative test will follow.  Reactive: HCV antibody test is abnormal, HCV RNA  Qualitative test will follow.  Note: HCV antibody testing is performed on the [employer] system.      [03-26-18 @ 16:50]

## 2018-04-08 LAB
BUN SERPL-MCNC: 85 MG/DL — HIGH (ref 7–23)
CA-I BLD-SCNC: 1.06 MMOL/L — SIGNIFICANT CHANGE UP (ref 1.03–1.23)
CALCIUM SERPL-MCNC: 8.2 MG/DL — LOW (ref 8.4–10.5)
CHLORIDE SERPL-SCNC: 93 MMOL/L — LOW (ref 98–107)
CO2 SERPL-SCNC: 26 MMOL/L — SIGNIFICANT CHANGE UP (ref 22–31)
CREAT SERPL-MCNC: 7.21 MG/DL — HIGH (ref 0.5–1.3)
GLUCOSE SERPL-MCNC: 103 MG/DL — HIGH (ref 70–99)
HCT VFR BLD CALC: 31.5 % — LOW (ref 34.5–45)
HGB BLD-MCNC: 9.6 G/DL — LOW (ref 11.5–15.5)
MAGNESIUM SERPL-MCNC: 2.4 MG/DL — SIGNIFICANT CHANGE UP (ref 1.6–2.6)
MCHC RBC-ENTMCNC: 28.7 PG — SIGNIFICANT CHANGE UP (ref 27–34)
MCHC RBC-ENTMCNC: 30.5 % — LOW (ref 32–36)
MCV RBC AUTO: 94.3 FL — SIGNIFICANT CHANGE UP (ref 80–100)
NRBC # FLD: 0 — SIGNIFICANT CHANGE UP
PHOSPHATE SERPL-MCNC: 6.2 MG/DL — HIGH (ref 2.5–4.5)
PLATELET # BLD AUTO: 237 K/UL — SIGNIFICANT CHANGE UP (ref 150–400)
PMV BLD: 10.9 FL — SIGNIFICANT CHANGE UP (ref 7–13)
POTASSIUM SERPL-MCNC: 5.4 MMOL/L — HIGH (ref 3.5–5.3)
POTASSIUM SERPL-SCNC: 5.4 MMOL/L — HIGH (ref 3.5–5.3)
RBC # BLD: 3.34 M/UL — LOW (ref 3.8–5.2)
RBC # FLD: 13.4 % — SIGNIFICANT CHANGE UP (ref 10.3–14.5)
SODIUM SERPL-SCNC: 136 MMOL/L — SIGNIFICANT CHANGE UP (ref 135–145)
WBC # BLD: 9.42 K/UL — SIGNIFICANT CHANGE UP (ref 3.8–10.5)
WBC # FLD AUTO: 9.42 K/UL — SIGNIFICANT CHANGE UP (ref 3.8–10.5)

## 2018-04-08 PROCEDURE — 99233 SBSQ HOSP IP/OBS HIGH 50: CPT

## 2018-04-08 RX ORDER — SODIUM POLYSTYRENE SULFONATE 4.1 MEQ/G
15 POWDER, FOR SUSPENSION ORAL ONCE
Refills: 0 | Status: COMPLETED | OUTPATIENT
Start: 2018-04-08 | End: 2018-04-08

## 2018-04-08 RX ORDER — INSULIN GLARGINE 100 [IU]/ML
4 INJECTION, SOLUTION SUBCUTANEOUS ONCE
Refills: 0 | Status: COMPLETED | OUTPATIENT
Start: 2018-04-08 | End: 2018-04-08

## 2018-04-08 RX ADMIN — Medication 1 DROP(S): at 13:17

## 2018-04-08 RX ADMIN — OXYCODONE HYDROCHLORIDE 5 MILLIGRAM(S): 5 TABLET ORAL at 15:13

## 2018-04-08 RX ADMIN — OXYCODONE HYDROCHLORIDE 5 MILLIGRAM(S): 5 TABLET ORAL at 09:50

## 2018-04-08 RX ADMIN — HEPARIN SODIUM 5000 UNIT(S): 5000 INJECTION INTRAVENOUS; SUBCUTANEOUS at 06:24

## 2018-04-08 RX ADMIN — SEVELAMER CARBONATE 1600 MILLIGRAM(S): 2400 POWDER, FOR SUSPENSION ORAL at 06:23

## 2018-04-08 RX ADMIN — LATANOPROST 1 DROP(S): 0.05 SOLUTION/ DROPS OPHTHALMIC; TOPICAL at 22:15

## 2018-04-08 RX ADMIN — Medication 100 MILLIGRAM(S): at 18:04

## 2018-04-08 RX ADMIN — Medication 1 DROP(S): at 06:24

## 2018-04-08 RX ADMIN — ACETAZOLAMIDE 500 MILLIGRAM(S): 250 TABLET ORAL at 18:03

## 2018-04-08 RX ADMIN — INSULIN GLARGINE 4 UNIT(S): 100 INJECTION, SOLUTION SUBCUTANEOUS at 22:46

## 2018-04-08 RX ADMIN — BRIMONIDINE TARTRATE 1 DROP(S): 2 SOLUTION/ DROPS OPHTHALMIC at 06:23

## 2018-04-08 RX ADMIN — SENNA PLUS 2 TABLET(S): 8.6 TABLET ORAL at 22:09

## 2018-04-08 RX ADMIN — SODIUM POLYSTYRENE SULFONATE 15 GRAM(S): 4.1 POWDER, FOR SUSPENSION ORAL at 12:02

## 2018-04-08 RX ADMIN — Medication 30 MILLIGRAM(S): at 06:23

## 2018-04-08 RX ADMIN — OXYCODONE HYDROCHLORIDE 5 MILLIGRAM(S): 5 TABLET ORAL at 23:00

## 2018-04-08 RX ADMIN — DORZOLAMIDE HYDROCHLORIDE 1 DROP(S): 20 SOLUTION/ DROPS OPHTHALMIC at 22:15

## 2018-04-08 RX ADMIN — Medication 1 DROP(S): at 06:23

## 2018-04-08 RX ADMIN — BRIMONIDINE TARTRATE 1 DROP(S): 2 SOLUTION/ DROPS OPHTHALMIC at 22:15

## 2018-04-08 RX ADMIN — DORZOLAMIDE HYDROCHLORIDE 1 DROP(S): 20 SOLUTION/ DROPS OPHTHALMIC at 13:18

## 2018-04-08 RX ADMIN — Medication 2001 MILLIGRAM(S): at 09:12

## 2018-04-08 RX ADMIN — Medication 100 MILLIGRAM(S): at 18:03

## 2018-04-08 RX ADMIN — OXYCODONE HYDROCHLORIDE 5 MILLIGRAM(S): 5 TABLET ORAL at 22:09

## 2018-04-08 RX ADMIN — OXCARBAZEPINE 300 MILLIGRAM(S): 300 TABLET, FILM COATED ORAL at 18:03

## 2018-04-08 RX ADMIN — Medication 2001 MILLIGRAM(S): at 13:17

## 2018-04-08 RX ADMIN — Medication 100 MILLIGRAM(S): at 06:23

## 2018-04-08 RX ADMIN — Medication 1 DROP(S): at 18:04

## 2018-04-08 RX ADMIN — ATORVASTATIN CALCIUM 20 MILLIGRAM(S): 80 TABLET, FILM COATED ORAL at 22:09

## 2018-04-08 RX ADMIN — Medication 15 UNIT(S): at 09:12

## 2018-04-08 RX ADMIN — Medication 15 UNIT(S): at 13:16

## 2018-04-08 RX ADMIN — Medication 15 UNIT(S): at 18:05

## 2018-04-08 RX ADMIN — Medication 2001 MILLIGRAM(S): at 18:04

## 2018-04-08 RX ADMIN — Medication 1 DROP(S): at 22:14

## 2018-04-08 RX ADMIN — ACETAZOLAMIDE 500 MILLIGRAM(S): 250 TABLET ORAL at 06:23

## 2018-04-08 RX ADMIN — OXYCODONE HYDROCHLORIDE 5 MILLIGRAM(S): 5 TABLET ORAL at 09:19

## 2018-04-08 RX ADMIN — BRIMONIDINE TARTRATE 1 DROP(S): 2 SOLUTION/ DROPS OPHTHALMIC at 13:18

## 2018-04-08 RX ADMIN — SEVELAMER CARBONATE 1600 MILLIGRAM(S): 2400 POWDER, FOR SUSPENSION ORAL at 13:17

## 2018-04-08 RX ADMIN — HEPARIN SODIUM 5000 UNIT(S): 5000 INJECTION INTRAVENOUS; SUBCUTANEOUS at 13:17

## 2018-04-08 RX ADMIN — OXCARBAZEPINE 300 MILLIGRAM(S): 300 TABLET, FILM COATED ORAL at 06:23

## 2018-04-08 RX ADMIN — OXYCODONE HYDROCHLORIDE 5 MILLIGRAM(S): 5 TABLET ORAL at 15:41

## 2018-04-08 RX ADMIN — HEPARIN SODIUM 5000 UNIT(S): 5000 INJECTION INTRAVENOUS; SUBCUTANEOUS at 22:15

## 2018-04-08 RX ADMIN — DORZOLAMIDE HYDROCHLORIDE 1 DROP(S): 20 SOLUTION/ DROPS OPHTHALMIC at 06:24

## 2018-04-08 RX ADMIN — SEVELAMER CARBONATE 1600 MILLIGRAM(S): 2400 POWDER, FOR SUSPENSION ORAL at 22:09

## 2018-04-08 RX ADMIN — Medication 100 MILLIGRAM(S): at 13:18

## 2018-04-08 RX ADMIN — Medication 100 MILLIGRAM(S): at 22:09

## 2018-04-08 NOTE — PROGRESS NOTE ADULT - PROBLEM SELECTOR PLAN 3
- Hyperkalemia, dialysis yesterday, pt counselled on diet to avoid potassium rich foods  - Will need SW evaluation re: transportation issues to and from HD  - K now 5.4, hyperkalemia resolving follow up renal note

## 2018-04-09 LAB
BUN SERPL-MCNC: 121 MG/DL — HIGH (ref 7–23)
BUN SERPL-MCNC: 54 MG/DL — HIGH (ref 7–23)
CALCIUM SERPL-MCNC: 7.4 MG/DL — LOW (ref 8.4–10.5)
CALCIUM SERPL-MCNC: 8.5 MG/DL — SIGNIFICANT CHANGE UP (ref 8.4–10.5)
CHLORIDE SERPL-SCNC: 92 MMOL/L — LOW (ref 98–107)
CHLORIDE SERPL-SCNC: 92 MMOL/L — LOW (ref 98–107)
CO2 SERPL-SCNC: 22 MMOL/L — SIGNIFICANT CHANGE UP (ref 22–31)
CO2 SERPL-SCNC: 26 MMOL/L — SIGNIFICANT CHANGE UP (ref 22–31)
CREAT SERPL-MCNC: 10.69 MG/DL — HIGH (ref 0.5–1.3)
CREAT SERPL-MCNC: 5.74 MG/DL — HIGH (ref 0.5–1.3)
GLUCOSE SERPL-MCNC: 113 MG/DL — HIGH (ref 70–99)
GLUCOSE SERPL-MCNC: 115 MG/DL — HIGH (ref 70–99)
HCT VFR BLD CALC: 28.8 % — LOW (ref 34.5–45)
HGB BLD-MCNC: 9.3 G/DL — LOW (ref 11.5–15.5)
MAGNESIUM SERPL-MCNC: 2.2 MG/DL — SIGNIFICANT CHANGE UP (ref 1.6–2.6)
MCHC RBC-ENTMCNC: 30 PG — SIGNIFICANT CHANGE UP (ref 27–34)
MCHC RBC-ENTMCNC: 32.3 % — SIGNIFICANT CHANGE UP (ref 32–36)
MCV RBC AUTO: 92.9 FL — SIGNIFICANT CHANGE UP (ref 80–100)
NRBC # FLD: 0 — SIGNIFICANT CHANGE UP
PHOSPHATE SERPL-MCNC: 7.6 MG/DL — HIGH (ref 2.5–4.5)
PLATELET # BLD AUTO: 212 K/UL — SIGNIFICANT CHANGE UP (ref 150–400)
PMV BLD: 10.5 FL — SIGNIFICANT CHANGE UP (ref 7–13)
POTASSIUM SERPL-MCNC: 4.9 MMOL/L — SIGNIFICANT CHANGE UP (ref 3.5–5.3)
POTASSIUM SERPL-MCNC: 6 MMOL/L — HIGH (ref 3.5–5.3)
POTASSIUM SERPL-SCNC: 4.9 MMOL/L — SIGNIFICANT CHANGE UP (ref 3.5–5.3)
POTASSIUM SERPL-SCNC: 6 MMOL/L — HIGH (ref 3.5–5.3)
RBC # BLD: 3.1 M/UL — LOW (ref 3.8–5.2)
RBC # FLD: 13.4 % — SIGNIFICANT CHANGE UP (ref 10.3–14.5)
SODIUM SERPL-SCNC: 132 MMOL/L — LOW (ref 135–145)
SODIUM SERPL-SCNC: 137 MMOL/L — SIGNIFICANT CHANGE UP (ref 135–145)
WBC # BLD: 10.93 K/UL — HIGH (ref 3.8–10.5)
WBC # FLD AUTO: 10.93 K/UL — HIGH (ref 3.8–10.5)

## 2018-04-09 PROCEDURE — 99233 SBSQ HOSP IP/OBS HIGH 50: CPT

## 2018-04-09 PROCEDURE — 99232 SBSQ HOSP IP/OBS MODERATE 35: CPT

## 2018-04-09 PROCEDURE — 90935 HEMODIALYSIS ONE EVALUATION: CPT

## 2018-04-09 RX ORDER — SODIUM POLYSTYRENE SULFONATE 4.1 MEQ/G
30 POWDER, FOR SUSPENSION ORAL
Refills: 0 | Status: DISCONTINUED | OUTPATIENT
Start: 2018-04-09 | End: 2018-04-10

## 2018-04-09 RX ORDER — ACETAZOLAMIDE 250 MG/1
250 TABLET ORAL EVERY 12 HOURS
Refills: 0 | Status: DISCONTINUED | OUTPATIENT
Start: 2018-04-09 | End: 2018-04-10

## 2018-04-09 RX ORDER — DEXTROSE 50 % IN WATER 50 %
25 SYRINGE (ML) INTRAVENOUS ONCE
Refills: 0 | Status: COMPLETED | OUTPATIENT
Start: 2018-04-09 | End: 2018-04-09

## 2018-04-09 RX ADMIN — Medication 25 GRAM(S): at 14:01

## 2018-04-09 RX ADMIN — Medication 100 MILLIGRAM(S): at 18:51

## 2018-04-09 RX ADMIN — Medication 15 UNIT(S): at 18:51

## 2018-04-09 RX ADMIN — ACETAZOLAMIDE 250 MILLIGRAM(S): 250 TABLET ORAL at 18:51

## 2018-04-09 RX ADMIN — HEPARIN SODIUM 5000 UNIT(S): 5000 INJECTION INTRAVENOUS; SUBCUTANEOUS at 05:27

## 2018-04-09 RX ADMIN — HALOPERIDOL DECANOATE 2 MILLIGRAM(S): 100 INJECTION INTRAMUSCULAR at 01:34

## 2018-04-09 RX ADMIN — SENNA PLUS 2 TABLET(S): 8.6 TABLET ORAL at 23:04

## 2018-04-09 RX ADMIN — INSULIN GLARGINE 9 UNIT(S): 100 INJECTION, SOLUTION SUBCUTANEOUS at 23:52

## 2018-04-09 RX ADMIN — Medication 30 MILLIGRAM(S): at 05:27

## 2018-04-09 RX ADMIN — Medication 1 DROP(S): at 05:27

## 2018-04-09 RX ADMIN — OXCARBAZEPINE 300 MILLIGRAM(S): 300 TABLET, FILM COATED ORAL at 05:27

## 2018-04-09 RX ADMIN — DORZOLAMIDE HYDROCHLORIDE 1 DROP(S): 20 SOLUTION/ DROPS OPHTHALMIC at 05:27

## 2018-04-09 RX ADMIN — Medication 1 DROP(S): at 15:26

## 2018-04-09 RX ADMIN — SEVELAMER CARBONATE 1600 MILLIGRAM(S): 2400 POWDER, FOR SUSPENSION ORAL at 21:35

## 2018-04-09 RX ADMIN — BRIMONIDINE TARTRATE 1 DROP(S): 2 SOLUTION/ DROPS OPHTHALMIC at 21:36

## 2018-04-09 RX ADMIN — Medication 100 MILLIGRAM(S): at 05:27

## 2018-04-09 RX ADMIN — Medication 1 DROP(S): at 21:36

## 2018-04-09 RX ADMIN — Medication 100 MILLIGRAM(S): at 15:25

## 2018-04-09 RX ADMIN — Medication 15 UNIT(S): at 09:10

## 2018-04-09 RX ADMIN — SEVELAMER CARBONATE 1600 MILLIGRAM(S): 2400 POWDER, FOR SUSPENSION ORAL at 05:27

## 2018-04-09 RX ADMIN — Medication 2001 MILLIGRAM(S): at 18:51

## 2018-04-09 RX ADMIN — Medication 1 DROP(S): at 18:51

## 2018-04-09 RX ADMIN — BRIMONIDINE TARTRATE 1 DROP(S): 2 SOLUTION/ DROPS OPHTHALMIC at 05:27

## 2018-04-09 RX ADMIN — ACETAZOLAMIDE 500 MILLIGRAM(S): 250 TABLET ORAL at 05:28

## 2018-04-09 RX ADMIN — BRIMONIDINE TARTRATE 1 DROP(S): 2 SOLUTION/ DROPS OPHTHALMIC at 15:26

## 2018-04-09 RX ADMIN — SEVELAMER CARBONATE 1600 MILLIGRAM(S): 2400 POWDER, FOR SUSPENSION ORAL at 15:25

## 2018-04-09 RX ADMIN — DORZOLAMIDE HYDROCHLORIDE 1 DROP(S): 20 SOLUTION/ DROPS OPHTHALMIC at 22:03

## 2018-04-09 RX ADMIN — ATORVASTATIN CALCIUM 20 MILLIGRAM(S): 80 TABLET, FILM COATED ORAL at 21:35

## 2018-04-09 RX ADMIN — LATANOPROST 1 DROP(S): 0.05 SOLUTION/ DROPS OPHTHALMIC; TOPICAL at 21:36

## 2018-04-09 RX ADMIN — OXCARBAZEPINE 300 MILLIGRAM(S): 300 TABLET, FILM COATED ORAL at 18:51

## 2018-04-09 RX ADMIN — Medication 2001 MILLIGRAM(S): at 09:10

## 2018-04-09 RX ADMIN — DORZOLAMIDE HYDROCHLORIDE 1 DROP(S): 20 SOLUTION/ DROPS OPHTHALMIC at 15:26

## 2018-04-09 RX ADMIN — Medication 100 MILLIGRAM(S): at 21:35

## 2018-04-09 NOTE — PROGRESS NOTE ADULT - SUBJECTIVE AND OBJECTIVE BOX
S: Pt seen and examined at bedside, reports pain OS comes and goes.     ROS:  General (neg), Vision (per HPI), Head and Neck (neg), Pulm (neg), CV (neg), GI (neg),  (neg), Musculoskeletal (neg), Skin/Integ (neg), Neuro (neg), Endocrine (neg), Heme (neg), All/Immuno (neg)    Mood and Affect Appropriate ( x ),  Oriented to Time, Place, and Person x 3 ( x )    Ophthalmology Exam    Visual acuity NLP OD, 20/800 (?effort)  Pupils: PERRL OU, no APD  Ttono: 14 OS  Extraocular movements (EOMs): Full OU, no pain, no diplopia    Pen light exam (PLE)  External:  Flat OU  Lids/Lashes/Lacrimal Ducts: Flat OU    Sclera/Conjunctiva:  W+Q OD, tube shunt superotemporally, W&Q OS  Cornea: 2+ MCE OD, clear OS  Anterior Chamber: superotemporal tube abutting iris OD, FLat, no hypopyon OS   Iris:  Flat OU, no NVI  Lens:  NS OU    A/P   37 F h/o HTN, DM and ESRD on HD  p/w diaphoresis and vomiting found to be hyperkalemic, 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 If diamox is causing metabolic disturbances or changes in mental status, ok to decrease to 250 BID.   - c/w brimonidine, latanoprost, timolol and dorzolamide   - no surgical intervention needed at this time, however may require eventual vitrectomy for non-clearing vitreous hemorrhage as an outpatient procedure       Follow-Up:  Patient should follow up [facility] within 1 week of discharge   [address] [place] [id number]  [phone]    s/d/w Dr. Murray    discussed findings with primary team S: Pt seen and examined at bedside, reports pain OS comes and goes.     ROS:  General (neg), Vision (per HPI), Head and Neck (neg), Pulm (neg), CV (neg), GI (neg),  (neg), Musculoskeletal (neg), Skin/Integ (neg), Neuro (neg), Endocrine (neg), Heme (neg), All/Immuno (neg)    Mood and Affect Appropriate ( agitated and uncooperative ),  Oriented to Time, Place, and Person x 3 ( x )    Ophthalmology Exam    Visual acuity NLP OD, 20/800 (?effort)  Pupils: PERRL OU, no APD  Ttono: 14 OS  Extraocular movements (EOMs): Full OU, no pain, no diplopia    Pen light exam (PLE)  External:  Flat OU  Lids/Lashes/Lacrimal Ducts: Flat OU    Sclera/Conjunctiva:  W+Q OD, tube shunt superotemporally, W&Q OS  Cornea: 2+ MCE OD, clear OS  Anterior Chamber: superotemporal tube abutting iris OD, FLat, no hypopyon OS   Iris:  Flat OU, no NVI  Lens:  NS OU    A/P   37 F h/o HTN, DM and ESRD on HD  p/w diaphoresis and vomiting found to be hyperkalemic, 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 If diamox is causing metabolic disturbances or changes in mental status, ok to decrease to 250 BID.   - c/w brimonidine, latanoprost, timolol and dorzolamide   - no surgical intervention needed at this time as IOP is controlled, however may require eventual vitrectomy for non-clearing vitreous hemorrhage as an outpatient procedure  - plan discussed with primary team and emphasized the importance of follow up within 1 week of discharge to team and patient.  If pt doesn't follow up, there could be potentially worsening of vision permanently.       Follow-Up:  Patient should follow up [facility] within 1 week of discharge   [address] [place] [address]  [phone]    s/d/w Dr. Murray    discussed findings with primary team

## 2018-04-09 NOTE — PROGRESS NOTE ADULT - SUBJECTIVE AND OBJECTIVE BOX
Patient is a 37y old  Female who presents with a chief complaint of Hyperkalemia (01 Apr 2018 09:18)      SUBJECTIVE / OVERNIGHT EVENTS: Pt was seen and examined at 12:15pm in the dialysis suite, no overnight events noted, when asked how her left eye pain is she says" same"  when asked about vision she reports the "same" and does not give further description,  no c/o nausea, vomiting or abdominal pain. No other issues reported.       MEDICATIONS  (STANDING):  acetazolamide    Tablet 500 milliGRAM(s) Oral two times a day  artificial  tears Solution 1 Drop(s) Left EYE three times a day  atorvastatin 20 milliGRAM(s) Oral at bedtime  brimonidine 0.2% Ophthalmic Solution 1 Drop(s) Left EYE three times a day  calcium acetate 2001 milliGRAM(s) Oral three times a day with meals  dextrose 5%. 1000 milliLiter(s) (50 mL/Hr) IV Continuous <Continuous>  dextrose 50% Injectable 12.5 Gram(s) IV Push once  dextrose 50% Injectable 25 Gram(s) IV Push once  dextrose 50% Injectable 25 Gram(s) IV Push once  docusate sodium 100 milliGRAM(s) Oral three times a day  dorzolamide 2% Ophthalmic Solution 1 Drop(s) Left EYE three times a day  insulin glargine Injectable (LANTUS) 9 Unit(s) SubCutaneous at bedtime  insulin lispro (HumaLOG) corrective regimen sliding scale   SubCutaneous three times a day before meals  insulin lispro (HumaLOG) corrective regimen sliding scale   SubCutaneous at bedtime  insulin lispro Injectable (HumaLOG) 15 Unit(s) SubCutaneous three times a day before meals  latanoprost 0.005% Ophthalmic Solution 1 Drop(s) Left EYE at bedtime  metoprolol tartrate 100 milliGRAM(s) Oral two times a day  NIFEdipine XL 30 milliGRAM(s) Oral daily  OXcarbazepine 300 milliGRAM(s) Oral two times a day  pregabalin 100 milliGRAM(s) Oral two times a day  senna 2 Tablet(s) Oral at bedtime  sevelamer hydrochloride 1600 milliGRAM(s) Oral three times a day  sodium polystyrene sulfonate Suspension 30 Gram(s) Oral <User Schedule>  timolol 0.5% Solution 1 Drop(s) Left EYE two times a day    MEDICATIONS  (PRN):  acetaminophen   Tablet. 650 milliGRAM(s) Oral every 6 hours PRN mild, moderate pain  dextrose Gel 1 Dose(s) Oral once PRN Blood Glucose LESS THAN 70 milliGRAM(s)/deciliter  glucagon  Injectable 1 milliGRAM(s) IntraMuscular once PRN Glucose LESS THAN 70 milligrams/deciliter  haloperidol     Tablet 2 milliGRAM(s) Oral every 6 hours PRN Agitation  haloperidol    Injectable 2 milliGRAM(s) IV Push every 6 hours PRN Agitation  haloperidol    Injectable 2 milliGRAM(s) IntraMuscular every 6 hours PRN Agitation  oxyCODONE    IR 5 milliGRAM(s) Oral every 6 hours PRN Severe Pain (7 - 10)      Vital Signs Last 24 Hrs  T(C): 36.8 (09 Apr 2018 11:20), Max: 36.9 (09 Apr 2018 05:24)  T(F): 98.2 (09 Apr 2018 11:20), Max: 98.4 (09 Apr 2018 05:24)  HR: 66 (09 Apr 2018 11:20) (66 - 73)  BP: 105/51 (09 Apr 2018 11:20) (105/51 - 142/76)  BP(mean): --  RR: 18 (09 Apr 2018 11:20) (18 - 18)  SpO2: 100% (09 Apr 2018 05:24) (99% - 100%)  CAPILLARY BLOOD GLUCOSE      POCT Blood Glucose.: 59 mg/dL (09 Apr 2018 13:51)  POCT Blood Glucose.: 112 mg/dL (09 Apr 2018 09:00)  POCT Blood Glucose.: 135 mg/dL (08 Apr 2018 22:06)  POCT Blood Glucose.: 109 mg/dL (08 Apr 2018 20:48)  POCT Blood Glucose.: 150 mg/dL (08 Apr 2018 17:21)    I&O's Summary      PHYSICAL EXAM:  GENERAL: NAD, well-developed  CHEST/LUNG: Clear to auscultation bilaterally; No wheeze  HEART: Regular rate and rhythm  ABDOMEN: Soft, Nontender, Nondistended  EXTREMITIES:  left leg BKA, Rt LE no edema  PSYCH: calm  NEUROLOGY: Asleep but easily arousable and responsive  Skin: No lesions      LABS:                        9.3    10.93 )-----------( 212      ( 09 Apr 2018 07:40 )             28.8     04-09    137  |  92<L>  |  121<H>  ----------------------------<  115<H>  6.0<H>   |  22  |  10.69<H>    Ca    7.4<L>      09 Apr 2018 07:40  Phos  7.6     04-09  Mg     2.4     04-08                RADIOLOGY & ADDITIONAL TESTS:    Imaging Personally Reviewed:    Consultant(s) Notes Reviewed:      Care Discussed with Consultants/Other Providers:

## 2018-04-09 NOTE — CHART NOTE - NSCHARTNOTEFT_GEN_A_CORE
Called by RN earlier, pt c/o Left eye pain. Pt seen and assessed by me, states pain comes and goes, about 7/10, sees flush like lites on /off, admits to have blurry vision on Left eye, Pt otherwise appears comfortable NAD VSS.  Spoke to Opthalmology on call ( pager # [phone]) and was recommended to give additional drop of each eye medicine if needed for any further pain. Above d/w hospitalist on call ext # [phone]. will c/t monitor closely. Called by RN earlier, pt c/o Left eye pain. Pt seen and assessed by me, states pain comes and goes, about 7/10, sees flush like lites on /off, admits to have blurry vision on Left eye, Pt otherwise appears comfortable NAD, VSS.  Spoke to Opthalmology on call ( pager # [phone]) and was recommended to give additional drop of each eye medicine if needed for any further pain, official opthalmology follow up in am.  Above d/w hospitalist on call ext # [phone]. will c/t monitor closely.

## 2018-04-09 NOTE — PROVIDER CONTACT NOTE (OTHER) - RECOMMENDATIONS
Hold insulin
Opthalmologist referred by team,
Two apple juices administered. 15 mins repeat blood glucose 106. Bedtime lantus held as per Tele PA. Will continue to monitor patient.
restart HD

## 2018-04-09 NOTE — PROVIDER CONTACT NOTE (MEDICATION) - ASSESSMENT
Pt with /89 HR 74. Tele PA [place] aware of pt BP. patient asymptomatic at this time denies CP, SOB, or generalized pain. No s/s of acute distress noted. Pt is going to HD first shift.
Patient intermittenly calling out and disturbing other patients. Unable to console patient at this time.

## 2018-04-09 NOTE — PROVIDER CONTACT NOTE (MEDICATION) - BACKGROUND
Patient admitted for missed dialysis sessions.
37 year old female admitted with hyperkalemia. PMH ESRD on HD.

## 2018-04-09 NOTE — PROGRESS NOTE ADULT - PROBLEM SELECTOR PLAN 3
Likely secondary to dietary indiscretion. Can potentially be exacerbated by heparin and timolol.  Start kayexalate every other day.   Strict dietary control.

## 2018-04-09 NOTE — PROVIDER CONTACT NOTE (OTHER) - SITUATION
Patient blood glucose 70 at bedtime.
Pts blood glucose is 57. Pt  is asymptomatic. Dextrose 50% 25mg IV given as per protocol. Blood sugar 150.
Pt complained of pain and blurry vission on left eye while on dialysis. BP elevated.
Pt pulled out venous needle in the middle of HD

## 2018-04-09 NOTE — PROGRESS NOTE ADULT - ATTENDING COMMENTS
I have interviewed and examined the patient and reviewed the residents note including the history, exam, assessment, and plan.  I agree with the residents assessment and plan.    37 F h/o HTN, DM and ESRD on HD  p/w diaphoresis and vomiting found to be hyperkalemic, 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 If diamox is causing metabolic disturbances or changes in mental status, ok to decrease to 250 BID.   - c/w brimonidine, latanoprost, timolol and dorzolamide   - no surgical intervention needed at this time as IOP is controlled, however may require eventual vitrectomy for non-clearing vitreous hemorrhage as an outpatient procedure  - plan discussed with primary team and emphasized the importance of follow up within 1 week of discharge to team and patient.  If pt doesn't follow up, there could be potentially worsening of vision permanently.    Follow-Up:  Patient should follow up [facility] within 1 week of discharge     Katlyn Murray MD

## 2018-04-09 NOTE — PROGRESS NOTE ADULT - SUBJECTIVE AND OBJECTIVE BOX
[facility] DIVISION OF KIDNEY DISEASES AND HYPERTENSION -- HEMODIALYSIS NOTE  --------------------------------------------------------------------------------  Chief Complaint: ESRD/Ongoing hemodialysis requirement    24 hour events/subjective:  Continues to complain of eye pain. Receiving extra HD today due to hyperkalemia. Seen on HD, tolerating it well.       PAST HISTORY  --------------------------------------------------------------------------------  No significant changes to PMH, PSH, FHx, SHx, unless otherwise noted    ALLERGIES & MEDICATIONS  --------------------------------------------------------------------------------    No Known Allergies      Standing Inpatient Medications  acetazolamide    Tablet 500 milliGRAM(s) Oral two times a day  artificial  tears Solution 1 Drop(s) Left EYE three times a day  atorvastatin 20 milliGRAM(s) Oral at bedtime  brimonidine 0.2% Ophthalmic Solution 1 Drop(s) Left EYE three times a day  calcium acetate 2001 milliGRAM(s) Oral three times a day with meals  dextrose 5%. 1000 milliLiter(s) IV Continuous <Continuous>  dextrose 50% Injectable 12.5 Gram(s) IV Push once  dextrose 50% Injectable 25 Gram(s) IV Push once  dextrose 50% Injectable 25 Gram(s) IV Push once  docusate sodium 100 milliGRAM(s) Oral three times a day  dorzolamide 2% Ophthalmic Solution 1 Drop(s) Left EYE three times a day  insulin glargine Injectable (LANTUS) 9 Unit(s) SubCutaneous at bedtime  insulin lispro (HumaLOG) corrective regimen sliding scale   SubCutaneous three times a day before meals  insulin lispro (HumaLOG) corrective regimen sliding scale   SubCutaneous at bedtime  insulin lispro Injectable (HumaLOG) 15 Unit(s) SubCutaneous three times a day before meals  latanoprost 0.005% Ophthalmic Solution 1 Drop(s) Left EYE at bedtime  metoprolol tartrate 100 milliGRAM(s) Oral two times a day  NIFEdipine XL 30 milliGRAM(s) Oral daily  OXcarbazepine 300 milliGRAM(s) Oral two times a day  pregabalin 100 milliGRAM(s) Oral two times a day  senna 2 Tablet(s) Oral at bedtime  sevelamer hydrochloride 1600 milliGRAM(s) Oral three times a day  sodium polystyrene sulfonate Suspension 30 Gram(s) Oral <User Schedule>  timolol 0.5% Solution 1 Drop(s) Left EYE two times a day    PRN Inpatient Medications  acetaminophen   Tablet. 650 milliGRAM(s) Oral every 6 hours PRN  dextrose Gel 1 Dose(s) Oral once PRN  glucagon  Injectable 1 milliGRAM(s) IntraMuscular once PRN  haloperidol     Tablet 2 milliGRAM(s) Oral every 6 hours PRN  haloperidol    Injectable 2 milliGRAM(s) IV Push every 6 hours PRN  haloperidol    Injectable 2 milliGRAM(s) IntraMuscular every 6 hours PRN  oxyCODONE    IR 5 milliGRAM(s) Oral every 6 hours PRN      REVIEW OF SYSTEMS  --------------------------------------------------------------------------------  Constitutional: [ ] Fever [ ] Chills [ ] Fatigue [ ] Weight change   HEENT: [ ] Blurred vision [x] Eye Pain [ ] Headache [ ] Runny nose [ ] Sore Throat   Respiratory: [ ] Cough [ ] Wheezing [ ] Shortness of breath  Cardiovascular: [ ] Chest Pain [ ] Palpitations [ ] TORRES [ ] PND [ ] Orthopnea  Gastrointestinal: [ ] Abdominal Pain [ ] Diarrhea [ ] Constipation [ ] Hemorrhoids [ ] Nausea [ ] Vomiting  Genitourinary: [ ] Nocturia [ ] Dysuria [ ] Incontinence  Extremities: [ ] Swelling [ ] Joint Pain  Neurologic: [ ] Focal deficit [ ] Paresthesias [ ] Syncope  Lymphatic: [ ] Swelling [ ] Lymphadenopathy   Skin: [ ] Rash [ ] Ecchymoses [ ] Wounds [ ] Lesions  Psychiatry: [ ] Depression [ ] Suicidal/Homicidal Ideation [ ] Anxiety [ ] Sleep Disturbances  [x] 10 point review of systems is otherwise negative except as mentioned above              [ ]Unable to obtain  All other systems were reviewed and are negative, except as noted.    VITALS/PHYSICAL EXAM  --------------------------------------------------------------------------------  T(C): 36.8 (18 @ 11:20), Max: 36.9 (18 @ 05:24)  HR: 66 (18 @ 11:20) (66 - 73)  BP: 105/51 (18 @ 11:20) (105/51 - 142/76)  RR: 18 (18 @ 11:20) (18 - 18)  SpO2: 100% (18 @ 05:24) (99% - 100%)  Wt(kg): --      Daily     Daily Weight in k.9 (2018 11:20)  I&O's Summary          Physical Exam:  	Gen: NAD   	HEENT: anicteric  	Pulm: CTA B/L   	CV: RRR  	Back: No dependent edema  	Abd: soft, nontender, nondistended  	: No gunter  	LE: Warm, no edema  	Neuro: no asterixis  	Skin: Warm, without rashes  	Vascular access: L arm AVF in use    LABS/STUDIES  --------------------------------------------------------------------------------              9.3    10.93 >-----------<  212      [18 07:40]              28.8     137  |  92  |  121  ----------------------------<  115      [18 07:40]  6.0   |  22  |  10.69        Ca     7.4     [18 07:40]      iCa    1.06     [ 05:35]      Mg     2.4     [18 05:35]      Phos  7.6     [18 07:40]      .9 (Ca --)      [18:30]   --  .5 (Ca --)      [18 @ 06:00]   --  HbA1c 7.7      [18 @ 09:20]    HBsAb 31.9      [18 @ 16:50]  HBsAg NEGATIVE      [18 16:50]  HBcAb Reactive      [18 16:50]  HCV 0.20, Nonreactive Hepatitis C AB  S/CO Ratio                        Interpretation  < 1.0                                     Non-Reactive  1.0 - 4.9                           Weakly-Reactive  > 5.0                                 Reactive  Non-Reactive: Aperson with a non-reactive HCV antibody  result is considered uninfected.  No further action is  needed unless recent infection is suspected.  In these  cases, consider repeat testing later to detect  seroconversion..  Weakly-Reactive: HCV antibody test is abnormal, HCV RNA  Qualitative test will follow.  Reactive: HCV antibody test is abnormal, HCV RNA  Qualitative test will follow.  Note: HCV antibody testing is performed on the [employer] system.      [18 @ 16:50]

## 2018-04-09 NOTE — PROVIDER CONTACT NOTE (MEDICATION) - RECOMMENDATIONS
prn Haldol for agitation and anxiety.
As per Tele PA and HD RN, AM PO BP meds to be rescheduled at this time and sent with pt chart to HD. No further interventions. Will continue to monitor.

## 2018-04-09 NOTE — PROVIDER CONTACT NOTE (OTHER) - ACTION/TREATMENT ORDERED:
Hold insulin
Restart HD with longer hours of HD
Pt will be seen by ophthalmologist. Team came and saw pt.
Two apple juices administered. 15 mins repeat blood glucose 106. Bedtime lantus held as per Tele PA. Will continue to monitor patient.

## 2018-04-09 NOTE — PROGRESS NOTE ADULT - PROBLEM SELECTOR PLAN 1
Extra HD today for hyperkalemia.  Will do 3 hours, low K bath.  Anticipate next HD tomorrow to keep on TTS schedule

## 2018-04-09 NOTE — PROVIDER CONTACT NOTE (OTHER) - ASSESSMENT
Patient asymptomatic and resting in bed.
Pt complained whole body pain even if she had percocet around 6AM. PA informed about it and said that she will order another pain medication. Pt pulled venous needle. Blood loss about 100ml. Vital sign stable.
alert and oriented. no sob and chest pain. afebrile.( see pre VS on flowsheet).
Blood glucose 59. Recheck 57.

## 2018-04-09 NOTE — PROVIDER CONTACT NOTE (MEDICATION) - ACTION/TREATMENT ORDERED:
As per Tele PA and HD RN, AM PO BP meds to be rescheduled at this time and sent with pt chart to HD. No further interventions. Will continue to monitor.
Okay to give prn Haldol 2mg PO for agitation and anxiety as per Tele PA's orders, will continue to monitor patient.

## 2018-04-09 NOTE — PROVIDER CONTACT NOTE (OTHER) - BACKGROUND
ESRD, DM, on chronic HD, Glaucoma
Pt admitted with hyperkalemia.
Hx ESRD
Patient admitted with hyperkalemia. PMH DM type 2 and ESRD on HD.

## 2018-04-09 NOTE — CHART NOTE - NSCHARTNOTESELECT_GEN_ALL_CORE
Event Note
Nutrition Follow-up Note/Nutrition Services
Ophthalmology/Event Note
Event Note
Event Note

## 2018-04-10 ENCOUNTER — APPOINTMENT (OUTPATIENT)
Dept: OPHTHALMOLOGY | Facility: CLINIC | Age: 38
End: 2018-04-10

## 2018-04-10 VITALS
OXYGEN SATURATION: 97 % | TEMPERATURE: 99 F | HEART RATE: 81 BPM | SYSTOLIC BLOOD PRESSURE: 155 MMHG | DIASTOLIC BLOOD PRESSURE: 88 MMHG | RESPIRATION RATE: 17 BRPM

## 2018-04-10 LAB
BUN SERPL-MCNC: 72 MG/DL — HIGH (ref 7–23)
CALCIUM SERPL-MCNC: 8.2 MG/DL — LOW (ref 8.4–10.5)
CHLORIDE SERPL-SCNC: 92 MMOL/L — LOW (ref 98–107)
CO2 SERPL-SCNC: 26 MMOL/L — SIGNIFICANT CHANGE UP (ref 22–31)
CREAT SERPL-MCNC: 7.59 MG/DL — HIGH (ref 0.5–1.3)
GLUCOSE SERPL-MCNC: 102 MG/DL — HIGH (ref 70–99)
HCT VFR BLD CALC: 28.4 % — LOW (ref 34.5–45)
HGB BLD-MCNC: 8.9 G/DL — LOW (ref 11.5–15.5)
MAGNESIUM SERPL-MCNC: 2.2 MG/DL — SIGNIFICANT CHANGE UP (ref 1.6–2.6)
MCHC RBC-ENTMCNC: 29 PG — SIGNIFICANT CHANGE UP (ref 27–34)
MCHC RBC-ENTMCNC: 31.3 % — LOW (ref 32–36)
MCV RBC AUTO: 92.5 FL — SIGNIFICANT CHANGE UP (ref 80–100)
NRBC # FLD: 0 — SIGNIFICANT CHANGE UP
PLATELET # BLD AUTO: 224 K/UL — SIGNIFICANT CHANGE UP (ref 150–400)
PMV BLD: 11.1 FL — SIGNIFICANT CHANGE UP (ref 7–13)
POTASSIUM SERPL-MCNC: 5.2 MMOL/L — SIGNIFICANT CHANGE UP (ref 3.5–5.3)
POTASSIUM SERPL-SCNC: 5.2 MMOL/L — SIGNIFICANT CHANGE UP (ref 3.5–5.3)
RBC # BLD: 3.07 M/UL — LOW (ref 3.8–5.2)
RBC # FLD: 13 % — SIGNIFICANT CHANGE UP (ref 10.3–14.5)
SODIUM SERPL-SCNC: 134 MMOL/L — LOW (ref 135–145)
WBC # BLD: 9.36 K/UL — SIGNIFICANT CHANGE UP (ref 3.8–10.5)
WBC # FLD AUTO: 9.36 K/UL — SIGNIFICANT CHANGE UP (ref 3.8–10.5)

## 2018-04-10 PROCEDURE — 90935 HEMODIALYSIS ONE EVALUATION: CPT

## 2018-04-10 PROCEDURE — 99239 HOSP IP/OBS DSCHRG MGMT >30: CPT

## 2018-04-10 RX ORDER — OXYCODONE HYDROCHLORIDE 5 MG/1
5 TABLET ORAL ONCE
Refills: 0 | Status: DISCONTINUED | OUTPATIENT
Start: 2018-04-10 | End: 2018-04-10

## 2018-04-10 RX ORDER — SODIUM POLYSTYRENE SULFONATE 4.1 MEQ/G
30 POWDER, FOR SUSPENSION ORAL
Qty: 0 | Refills: 0 | DISCHARGE
Start: 2018-04-10

## 2018-04-10 RX ORDER — SENNA PLUS 8.6 MG/1
2 TABLET ORAL
Qty: 0 | Refills: 0 | DISCHARGE
Start: 2018-04-10

## 2018-04-10 RX ORDER — ERYTHROPOIETIN 10000 [IU]/ML
5000 INJECTION, SOLUTION INTRAVENOUS; SUBCUTANEOUS
Refills: 0 | Status: DISCONTINUED | OUTPATIENT
Start: 2018-04-10 | End: 2018-04-10

## 2018-04-10 RX ORDER — SEVELAMER CARBONATE 2400 MG/1
2 POWDER, FOR SUSPENSION ORAL
Qty: 0 | Refills: 0 | DISCHARGE
Start: 2018-04-10

## 2018-04-10 RX ORDER — OXYCODONE HYDROCHLORIDE 5 MG/1
5 TABLET ORAL EVERY 6 HOURS
Refills: 0 | Status: DISCONTINUED | OUTPATIENT
Start: 2018-04-10 | End: 2018-04-10

## 2018-04-10 RX ORDER — ACETAZOLAMIDE 250 MG/1
1 TABLET ORAL
Qty: 0 | Refills: 0 | DISCHARGE
Start: 2018-04-10

## 2018-04-10 RX ORDER — DOCUSATE SODIUM 100 MG
1 CAPSULE ORAL
Qty: 0 | Refills: 0 | DISCHARGE
Start: 2018-04-10

## 2018-04-10 RX ADMIN — OXYCODONE HYDROCHLORIDE 5 MILLIGRAM(S): 5 TABLET ORAL at 09:50

## 2018-04-10 RX ADMIN — Medication 100 MILLIGRAM(S): at 13:03

## 2018-04-10 RX ADMIN — BRIMONIDINE TARTRATE 1 DROP(S): 2 SOLUTION/ DROPS OPHTHALMIC at 05:59

## 2018-04-10 RX ADMIN — Medication 100 MILLIGRAM(S): at 13:04

## 2018-04-10 RX ADMIN — OXYCODONE HYDROCHLORIDE 5 MILLIGRAM(S): 5 TABLET ORAL at 16:01

## 2018-04-10 RX ADMIN — Medication 15 UNIT(S): at 13:44

## 2018-04-10 RX ADMIN — DORZOLAMIDE HYDROCHLORIDE 1 DROP(S): 20 SOLUTION/ DROPS OPHTHALMIC at 05:59

## 2018-04-10 RX ADMIN — Medication 2: at 13:45

## 2018-04-10 RX ADMIN — ERYTHROPOIETIN 5000 UNIT(S): 10000 INJECTION, SOLUTION INTRAVENOUS; SUBCUTANEOUS at 09:31

## 2018-04-10 RX ADMIN — Medication 1 DROP(S): at 06:04

## 2018-04-10 RX ADMIN — SEVELAMER CARBONATE 1600 MILLIGRAM(S): 2400 POWDER, FOR SUSPENSION ORAL at 13:04

## 2018-04-10 RX ADMIN — OXYCODONE HYDROCHLORIDE 5 MILLIGRAM(S): 5 TABLET ORAL at 05:56

## 2018-04-10 RX ADMIN — OXCARBAZEPINE 300 MILLIGRAM(S): 300 TABLET, FILM COATED ORAL at 13:08

## 2018-04-10 RX ADMIN — Medication 1 DROP(S): at 05:59

## 2018-04-10 RX ADMIN — HALOPERIDOL DECANOATE 2 MILLIGRAM(S): 100 INJECTION INTRAMUSCULAR at 05:57

## 2018-04-10 RX ADMIN — OXYCODONE HYDROCHLORIDE 5 MILLIGRAM(S): 5 TABLET ORAL at 10:22

## 2018-04-10 RX ADMIN — DORZOLAMIDE HYDROCHLORIDE 1 DROP(S): 20 SOLUTION/ DROPS OPHTHALMIC at 13:05

## 2018-04-10 RX ADMIN — Medication 2001 MILLIGRAM(S): at 13:03

## 2018-04-10 RX ADMIN — OXYCODONE HYDROCHLORIDE 5 MILLIGRAM(S): 5 TABLET ORAL at 06:30

## 2018-04-10 RX ADMIN — Medication 15 UNIT(S): at 08:29

## 2018-04-10 RX ADMIN — Medication 2001 MILLIGRAM(S): at 08:29

## 2018-04-10 RX ADMIN — Medication 1 DROP(S): at 13:05

## 2018-04-10 RX ADMIN — BRIMONIDINE TARTRATE 1 DROP(S): 2 SOLUTION/ DROPS OPHTHALMIC at 13:04

## 2018-04-10 NOTE — PROGRESS NOTE ADULT - PROBLEM SELECTOR PLAN 4
Adequately controlled on current regimen. Cont the current anti-hypertensives. Likely secondary to dietary indiscretion. Can potentially be exacerbated by heparin and timolol.  Heparin removed.   Start kayexalate every other day.   Strict dietary control.

## 2018-04-10 NOTE — PROGRESS NOTE ADULT - ASSESSMENT
38 y/o Female, with a PmHx of HTN, DM2, left BKA with prosthesis, ESRD (on hemodialysis T/Th/S), p/w diaphoresis and vomiting this morning found to have a potassium 6.8, EKG with peaked T's V4-V6. Admitted to telemetry for monitoring and HD.
Pt. with ESRD on HD TIW admitted with acute SOB, fluid overload, HTN and severe hyperkalemia. Pt. seen during HD today. /102 at time of HD rounds.
38 y/o Female, with a PmHx of HTN, DM2, left BKA with prosthesis, ESRD (on hemodialysis T/Th/S), admitted for Hyperkalemia. Admitted to telemetry for monitoring and HD.
38 y/o Female, with a PmHx of HTN, DM2, left BKA with prosthesis, ESRD (on hemodialysis T/Th/S), p/w diaphoresis and vomiting this morning found to have a potassium 6.8, EKG with peaked T's V4-V6. Admitted to telemetry for monitoring and HD.
Pt. with ESRD on HD TIW admitted with acute SOB, fluid overload, HTN and severe hyperkalemia with ekg changes.
Pt. with ESRD on HD TIW admitted with acute SOB, fluid overload, HTN and severe hyperkalemia. Pt. clinically improved. Pt. seen during HD today. /60 at time of HD rounds.
Pt. with ESRD on HD TIW admitted with acute SOB, fluid overload, HTN and severe hyperkalemia with ekg changes.
38 y/o Female, with a PmHx of HTN, DM2, left BKA with prosthesis, ESRD (on hemodialysis T/Th/S), p/w diaphoresis and vomiting this morning found to have a potassium 6.8, EKG with peaked T's V4-V6. Admitted to telemetry for monitoring and HD.
Patient examined and ROS reviewed. A patient of renal failure examined during dialysis. Patient is tolerating dialysis well. Blood flow through access is adequate. Advised to continue UF.
Pt. with ESRD on HD TIW admitted with acute SOB, fluid overload, HTN and severe hyperkalemia with ekg changes. Pt. clinically improved. Enalapril discontinued due to hyperkalemia.   Patient complained of blurry vision worse than before on HD. Patient evaluated and d/w primary team. Patient to get optho eval
36 y/o Female, with a PmHx of HTN, DM2, left BKA with prosthesis, ESRD (on hemodialysis T/Th/S), p/w diaphoresis and vomiting this morning found to have a potassium 6.8, EKG with peaked T's V4-V6. Admitted to telemetry for monitoring and HD.
38 y/o Female, with a PmHx of HTN, DM2, left BKA with prosthesis, ESRD (on hemodialysis T/Th/S), p/w diaphoresis and vomiting this morning found to have a potassium 6.8, EKG with peaked T's V4-V6. Admitted to telemetry for monitoring and HD.
38 y/o Female, with a PmHx of HTN, DM2, left BKA with prosthesis, ESRD (on hemodialysis T/Th/S), p/w diaphoresis and vomiting this morning found to have a potassium 6.8, EKG with peaked T's V4-V6. Admitted to telemetry for monitoring and HD.
Pt. with ESRD on HD TIW admitted with acute SOB, fluid overload, HTN and severe hyperkalemia with ekg changes. Pt. clinically improved. Enalapril discontinued due to hyperkalemia. recirc study done 4/4/18 4%.   Patient complained of blurry vision worse than before on HD. patient needs urgent vitrectomy and glaucoma tube surgery.
Pt. 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
Pt. with ESRD on HD TIW admitted with acute SOB, fluid overload, HTN and severe hyperkalemia with ekg changes.
38 y/o Female, with a PmHx of HTN, DM2, left BKA with prosthesis, ESRD (on hemodialysis T/Th/S), p/w diaphoresis and vomiting this morning found to have a potassium 6.8, EKG with peaked T's V4-V6. Admitted to telemetry for monitoring and HD.
36 y/o Female, with a PmHx of HTN, DM2, left BKA with prosthesis, ESRD (on hemodialysis T/Th/S), p/w diaphoresis and vomiting this morning found to have a potassium 6.8, EKG with peaked T's V4-V6. Admitted to telemetry for monitoring and HD.
36 y/o Female, with a PmHx of HTN, DM2, left BKA with prosthesis, ESRD (on hemodialysis T/Th/S), p/w diaphoresis and vomiting this morning found to have a potassium 6.8, EKG with peaked T's V4-V6. Admitted to telemetry for monitoring and HD.
38 y/o Female, with a PmHx of HTN, DM2, left BKA with prosthesis, ESRD (on hemodialysis T/Th/S), p/w diaphoresis and vomiting this morning found to have a potassium 6.8, EKG with peaked T's V4-V6. Admitted to telemetry for monitoring and HD.
38 y/o Female, with a PmHx of HTN, DM2, left BKA with prosthesis, ESRD (on hemodialysis T/Th/S), admitted for Hyperkalemia. Admitted to telemetry for monitoring and HD.

## 2018-04-10 NOTE — PROGRESS NOTE ADULT - ATTENDING COMMENTS
I have reviewed the residents note including the history, exam, assessment, and plan.  I agree with the residents assessment and plan.    Katlyn Murray MD

## 2018-04-10 NOTE — PROGRESS NOTE ADULT - PROBLEM SELECTOR PLAN 8
cont hsq for dvt ppx
cont hsq for dvt ppx
restart hsq for svt ppx
cont hsq for dvt ppx
cont hsq for dvt ppx  d/c today to rehab, d/c planning time spent in coordination of care ( discussing with CM/SW, preparing d/c summary, plan)  time spent 37 mts.
cont hsq for dvt ppx

## 2018-04-10 NOTE — PROGRESS NOTE ADULT - PROBLEM SELECTOR PLAN 6
- heparin 5,000u sc tid  - PT evaluation
- heparin 5,000u sc tid  - PT evaluation
- Cont Procardia and metoprolol, cont to monitor BP
- Cont Procardia and metoprolol, cont to monitor BP
- heparin 5,000u sc tid
- heparin 5,000u sc tid  - PT evaluation
- Cont Procardia and metoprolol, cont to monitor BP
- Cont Procardia and metoprolol, cont to monitor BP
- Cont Procardia and monitor BP
- Carb consistent diet  - HbA1c 7.7  - HISS, received half dose of lantus, premeal insulin on hold as pt is npo, will  monitor finger stick glucose
- Cont Procardia and metoprolol, cont to monitor BP
- Carb consistent diet  - HbA1c 7.7  - HISS, basal and bolus insulin

## 2018-04-10 NOTE — PROGRESS NOTE ADULT - PROBLEM SELECTOR PLAN 7
- Carb consistent diet  - HbA1c 7.7  - HISS,  cont lantus and  premeal insulin
- Carb consistent diet  - HbA1c 7.7  - HISS, lantus was not given as pt's glucose was 70 last night, ( pt was also npo for a while yesterday waiting for sx ) will monitor today, cont lantus and  premeal insulin
hsq on hold for sx
- Carb consistent diet  - HbA1c 7.7  - HISS,  cont lantus and  premeal insulin
- Carb consistent diet  - HbA1c 7.7  - HISS,  cont lantus and  premeal insulin
- heparin 5,000u sc tid
- Carb consistent diet  - HbA1c 7.7  - HISS,  cont lantus and  premeal insulin
- Carb consistent diet  - HbA1c 7.7  - HISS,  cont lantus and  premeal insulin

## 2018-04-10 NOTE — PROGRESS NOTE ADULT - PROBLEM SELECTOR PLAN 5
- Appreciate renal input  - Cont current meds,, renal diet   - fluid overload is improving now - s/p over 7 L removed at HD since admission  - follow up with renal regarding further HD sessions inpt   - SW evaluation for transportation needs to and from HD
- Carb consistent diet  - HbA1c 7.7  - HISS
- Cont Procardia and monitor BP
- Appreciate renal input  - Cont current meds,, renal diet   - fluid overload is improving now - s/p over 7 L removed at HD since admission  - follow up with renal regarding further HD sessions inpt   - SW evaluation for transportation needs to and from HD
- Appreciate renal input  - Cont current meds,, renal diet   - fluid overload is improving now - s/p over 7 L removed at HD since admission  - follow up with renal regarding further HD sessions inpt   - SW evaluation for transportation needs to and from HD
K persistently elevated. Patient should maintain a strict low k diet.  Recirc study 4% 4/4/18  Will optimize treatment time.
- Carb consistent diet  - HbA1c 7.7  - HISS
- Appreciate renal input  - Cont current meds,, renal diet   - fluid overload is improving now - s/p over 7 L removed at HD since admission  - follow up with renal regarding further HD sessions inpt   - SW evaluation for transportation needs to and from HD
BP much improved.   Cont current anti-hypertensive regimen.
- Appreciate renal input  - Cont current meds,, renal diet   - fluid overload is improving now - s/p over 7 L removed at HD since admission  - follow up with renal regarding further HD sessions inpt   - SW evaluation for transportation needs to and from HD
BP much improved.   Cont current anti-hypertensive regimen.
K persistently elevated. Patient should maintain a strict low k diet.  will do recirculation studies if patient is still here.  May increase treatment time as well
- Carb consistent diet  - HbA1c 7.7  - HISS, basal and bolus insulin
- Carb consistent diet  - HbA1c 7.7  - HISS
Adequately controlled on current regimen. Cont the current anti-hypertensives.
- Cont Procardia and monitor BP
- Appreciate renal input  - Cont current meds,, renal diet   - fluid overload is improving now - s/p over 7 L removed at HD since admission  - follow up with renal regarding further HD sessions inpt   - SW evaluation for transportation needs to and from HD

## 2018-04-10 NOTE — PROGRESS NOTE ADULT - ATTENDING COMMENTS
Seen for ESRD and HD. Patient seen and examined while undergoing dialysis, tolerating it well. Feeling better today. Cont phos binders and current anti-hypertensives. Cont JONNY. BP adequately controlled on current regimen of anti-hypertensives. Anticipate next routine HD on thurs, 4/12.

## 2018-04-10 NOTE — PROGRESS NOTE ADULT - SUBJECTIVE AND OBJECTIVE BOX
Patient is a 37y old  Female who presents with a chief complaint of Hyperkalemia (01 Apr 2018 09:18)      SUBJECTIVE / OVERNIGHT EVENTS: Pt was seen and examined at 11:35am, got haldol for agitation last night, reports that she has left eye pain " sometimes", no other new issues, no c/o nausea, vomiting or abdominal pain. No other issues reported.    MEDICATIONS  (STANDING):  acetazolamide    Tablet 250 milliGRAM(s) Oral every 12 hours  artificial  tears Solution 1 Drop(s) Left EYE three times a day  atorvastatin 20 milliGRAM(s) Oral at bedtime  brimonidine 0.2% Ophthalmic Solution 1 Drop(s) Left EYE three times a day  calcium acetate 2001 milliGRAM(s) Oral three times a day with meals  dextrose 5%. 1000 milliLiter(s) (50 mL/Hr) IV Continuous <Continuous>  dextrose 50% Injectable 12.5 Gram(s) IV Push once  dextrose 50% Injectable 25 Gram(s) IV Push once  dextrose 50% Injectable 25 Gram(s) IV Push once  docusate sodium 100 milliGRAM(s) Oral three times a day  dorzolamide 2% Ophthalmic Solution 1 Drop(s) Left EYE three times a day  epoetin carlos Injectable 5000 Unit(s) IV Push <User Schedule>  insulin glargine Injectable (LANTUS) 9 Unit(s) SubCutaneous at bedtime  insulin lispro (HumaLOG) corrective regimen sliding scale   SubCutaneous three times a day before meals  insulin lispro (HumaLOG) corrective regimen sliding scale   SubCutaneous at bedtime  insulin lispro Injectable (HumaLOG) 15 Unit(s) SubCutaneous three times a day before meals  latanoprost 0.005% Ophthalmic Solution 1 Drop(s) Left EYE at bedtime  metoprolol tartrate 100 milliGRAM(s) Oral two times a day  NIFEdipine XL 30 milliGRAM(s) Oral daily  OXcarbazepine 300 milliGRAM(s) Oral two times a day  pregabalin 100 milliGRAM(s) Oral every 12 hours  senna 2 Tablet(s) Oral at bedtime  sevelamer hydrochloride 1600 milliGRAM(s) Oral three times a day  sodium polystyrene sulfonate Suspension 30 Gram(s) Oral <User Schedule>  timolol 0.5% Solution 1 Drop(s) Left EYE two times a day    MEDICATIONS  (PRN):  acetaminophen   Tablet. 650 milliGRAM(s) Oral every 6 hours PRN mild, moderate pain  dextrose Gel 1 Dose(s) Oral once PRN Blood Glucose LESS THAN 70 milliGRAM(s)/deciliter  glucagon  Injectable 1 milliGRAM(s) IntraMuscular once PRN Glucose LESS THAN 70 milligrams/deciliter  oxyCODONE    IR 5 milliGRAM(s) Oral every 6 hours PRN Moderate Pain (4 - 6)      Vital Signs Last 24 Hrs  T(C): 36.9 (10 Apr 2018 12:23), Max: 37.2 (09 Apr 2018 20:20)  T(F): 98.5 (10 Apr 2018 12:23), Max: 99 (09 Apr 2018 20:20)  HR: 81 (10 Apr 2018 12:23) (69 - 81)  BP: 162/72 (10 Apr 2018 12:23) (121/66 - 164/83)  BP(mean): --  RR: 16 (10 Apr 2018 12:23) (16 - 18)  SpO2: 99% (10 Apr 2018 12:23) (99% - 100%)  CAPILLARY BLOOD GLUCOSE      POCT Blood Glucose.: 176 mg/dL (10 Apr 2018 13:43)  POCT Blood Glucose.: 130 mg/dL (10 Apr 2018 07:51)  POCT Blood Glucose.: 99 mg/dL (10 Apr 2018 05:54)  POCT Blood Glucose.: 133 mg/dL (09 Apr 2018 23:38)  POCT Blood Glucose.: 91 mg/dL (09 Apr 2018 23:14)  POCT Blood Glucose.: 64 mg/dL (09 Apr 2018 22:52)  POCT Blood Glucose.: 113 mg/dL (09 Apr 2018 17:26)    I&O's Summary    09 Apr 2018 07:01  -  10 Apr 2018 07:00  --------------------------------------------------------  IN: 400 mL / OUT: 1600 mL / NET: -1200 mL    10 Apr 2018 07:01  -  10 Apr 2018 14:15  --------------------------------------------------------  IN: 400 mL / OUT: 1062 mL / NET: -662 mL        PHYSICAL EXAM:  GENERAL: NAD, well-developed  CHEST/LUNG: Clear to auscultation bilaterally; No wheeze  HEART: Regular rate and rhythm  ABDOMEN: Soft, Nontender, Nondistended  EXTREMITIES:  left leg BKA, Rt LE no edema  PSYCH: calm  NEUROLOGY: Alert, just walked with Physical therapy, responsive  Skin: No lesions      LABS:                        8.9    9.36  )-----------( 224      ( 10 Apr 2018 06:35 )             28.4     04-10    134<L>  |  92<L>  |  72<H>  ----------------------------<  102<H>  5.2   |  26  |  7.59<H>    Ca    8.2<L>      10 Apr 2018 06:35  Phos  7.6     04-09  Mg     2.2     04-10                RADIOLOGY & ADDITIONAL TESTS:    Imaging Personally Reviewed:    Consultant(s) Notes Reviewed:      Care Discussed with Consultants/Other Providers:

## 2018-04-10 NOTE — PROGRESS NOTE ADULT - PROBLEM SELECTOR PLAN 3
Likely secondary to dietary indiscretion. Can potentially be exacerbated by heparin and timolol.  Heparin removed.   Start kayexalate every other day.   Strict dietary control. Cont Ca acetate and sevelamer  .9 stopped calcitriol

## 2018-04-10 NOTE — PROGRESS NOTE ADULT - PROBLEM SELECTOR PROBLEM 7
Diabetes
Need for prophylactic measure
Diabetes
Need for prophylactic measure
Diabetes

## 2018-04-10 NOTE — PROGRESS NOTE ADULT - PROBLEM SELECTOR PROBLEM 5
Diabetes
Diabetes
Hyperkalemia
Hypertension
Essential hypertension
ESRD (end stage renal disease) on dialysis
Essential hypertension
Hyperkalemia
Hypertension
Diabetes
ESRD (end stage renal disease) on dialysis
Diabetes
ESRD (end stage renal disease) on dialysis
Hypertension
ESRD (end stage renal disease) on dialysis

## 2018-04-10 NOTE — PROGRESS NOTE ADULT - SUBJECTIVE AND OBJECTIVE BOX
S: Pt seen and examined at bedside, reports mild headache.     ROS:  General (neg), Vision (per HPI), Head and Neck (neg), Pulm (neg), CV (neg), GI (neg),  (neg), Musculoskeletal (neg), Skin/Integ (neg), Neuro (neg), Endocrine (neg), Heme (neg), All/Immuno (neg)    Mood and Affect Appropriate ( x ),  Oriented to Time, Place, and Person x 3 ( x )    Ophthalmology Exam    Visual acuity NLP OD, 20/800 (?effort)  Pupils: PERRL OU, no APD  Ttono: 31 OD, 14 OS  Extraocular movements (EOMs): Full OU, no pain, no diplopia    Pen light exam (PLE)  External:  Flat OU  Lids/Lashes/Lacrimal Ducts: Flat OU    Sclera/Conjunctiva:  W+Q OD, tube shunt superotemporally, W&Q OS  Cornea: 2+ MCE OD, clear OS  Anterior Chamber: superotemporal tube abutting iris OD, FLat, no hypopyon OS   Iris:  Flat OU, no NVI  Lens:  NS OU    A/P   37 F h/o HTN, DM and ESRD on HD  p/w diaphoresis and vomiting found to be hyperkalemic, 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 Zafar), had recent VH OS, and new onset ghost cell glaucoma OS    1. 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  - c/w brimonidine, latanoprost, timolol and dorzolamide   - no surgical intervention needed at this time, however may require eventual vitrectomy for non-clearing vitreous hemorrhage as an outpatient procedure       Follow-Up:  Patient MUST follow up [facility] within 1 week of discharge   [address] [place] [id number]  [phone]    s/d/w Dr. Murray    discussed findings with primary team

## 2018-04-10 NOTE — PROGRESS NOTE ADULT - SUBJECTIVE AND OBJECTIVE BOX
[facility] DIVISION OF KIDNEY DISEASES AND HYPERTENSION -- HEMODIALYSIS NOTE  --------------------------------------------------------------------------------  Dayron Ayala     --------------------------------------------------------------------------------  Chief Complaint: ESRD/Ongoing hemodialysis requirement    24 hour events/subjective:  no acute events noted      PAST HISTORY  --------------------------------------------------------------------------------  No significant changes to PMH, PSH, FHx, SHx, unless otherwise noted    ALLERGIES & MEDICATIONS  --------------------------------------------------------------------------------  Allergies    No Known Allergies    Intolerances      Standing Inpatient Medications  acetazolamide    Tablet 250 milliGRAM(s) Oral every 12 hours  artificial  tears Solution 1 Drop(s) Left EYE three times a day  atorvastatin 20 milliGRAM(s) Oral at bedtime  brimonidine 0.2% Ophthalmic Solution 1 Drop(s) Left EYE three times a day  calcium acetate 2001 milliGRAM(s) Oral three times a day with meals  dextrose 5%. 1000 milliLiter(s) IV Continuous <Continuous>  dextrose 50% Injectable 12.5 Gram(s) IV Push once  dextrose 50% Injectable 25 Gram(s) IV Push once  dextrose 50% Injectable 25 Gram(s) IV Push once  docusate sodium 100 milliGRAM(s) Oral three times a day  dorzolamide 2% Ophthalmic Solution 1 Drop(s) Left EYE three times a day  insulin glargine Injectable (LANTUS) 9 Unit(s) SubCutaneous at bedtime  insulin lispro (HumaLOG) corrective regimen sliding scale   SubCutaneous three times a day before meals  insulin lispro (HumaLOG) corrective regimen sliding scale   SubCutaneous at bedtime  insulin lispro Injectable (HumaLOG) 15 Unit(s) SubCutaneous three times a day before meals  latanoprost 0.005% Ophthalmic Solution 1 Drop(s) Left EYE at bedtime  metoprolol tartrate 100 milliGRAM(s) Oral two times a day  NIFEdipine XL 30 milliGRAM(s) Oral daily  OXcarbazepine 300 milliGRAM(s) Oral two times a day  senna 2 Tablet(s) Oral at bedtime  sevelamer hydrochloride 1600 milliGRAM(s) Oral three times a day  sodium polystyrene sulfonate Suspension 30 Gram(s) Oral <User Schedule>  timolol 0.5% Solution 1 Drop(s) Left EYE two times a day    PRN Inpatient Medications  acetaminophen   Tablet. 650 milliGRAM(s) Oral every 6 hours PRN  dextrose Gel 1 Dose(s) Oral once PRN  glucagon  Injectable 1 milliGRAM(s) IntraMuscular once PRN  haloperidol     Tablet 2 milliGRAM(s) Oral every 6 hours PRN  haloperidol    Injectable 2 milliGRAM(s) IV Push every 6 hours PRN  haloperidol    Injectable 2 milliGRAM(s) IntraMuscular every 6 hours PRN      REVIEW OF SYSTEMS  --------------------------------------------------------------------------------  Constitutional: [ ] Fever [ ] Chills [ ] Fatigue [ ] Weight change   HEENT: [ ] Blurred vision [ ] Eye Pain [ ] Headache [ ] Runny nose [ ] Sore Throat   Respiratory: [ ] Cough [ ] Wheezing [ ] Shortness of breath  Cardiovascular: [ ] Chest Pain [ ] Palpitations [ ] TORRES [ ] PND [ ] Orthopnea  Gastrointestinal: [ ] Abdominal Pain [ ] Diarrhea [ ] Constipation [ ] Hemorrhoids [ ] Nausea [ ] Vomiting  Genitourinary: [ ] Nocturia [ ] Dysuria [ ] Incontinence  Extremities: [ ] Swelling [ ] Joint Pain  Neurologic: [ ] Focal deficit [ ] Paresthesias [ ] Syncope  Lymphatic: [ ] Swelling [ ] Lymphadenopathy   Skin: [ ] Rash [ ] Ecchymoses [ ] Wounds [ ] Lesions  Psychiatry: [ ] Depression [ ] Suicidal/Homicidal Ideation [ ] Anxiety [ ] Sleep Disturbances  [x ] 10 point review of systems is otherwise negative except as mentioned above              [ ]Unable to obtain  All other systems were reviewed and are negative, except as noted.    VITALS/PHYSICAL EXAM  --------------------------------------------------------------------------------  T(C): 36.5 (04-10-18 @ 06:30), Max: 37.2 (18 @ 20:20)  HR: 71 (04-10-18 @ 06:30) (66 - 72)  BP: 146/76 (04-10-18 @ 06:30) (105/51 - 164/83)  RR: 16 (04-10-18 @ 06:30) (16 - 18)  SpO2: 99% (04-10-18 @ 05:36) (99% - 100%)  Wt(kg): --      Daily     Daily Weight in k (10 Apr 2018 06:30)  I&O's Summary    2018 07:01  -  10 Apr 2018 07:00  --------------------------------------------------------  IN: 400 mL / OUT: 1600 mL / NET: -1200 mL          18 @ 07:01  -  04-10-18 @ 07:00  --------------------------------------------------------  IN: 400 mL / OUT: 1600 mL / NET: -1200 mL        Physical Exam:  	Gen: NAD   	HEENT: anicteric  	Pulm: CTA B/L   	CV: RRR  	Back: No dependent edema  	Abd: soft, nontender, nondistended  	: No gunter  	LE: Warm, no edema  	Neuro: no asterixis  	Skin: Warm, without rashes  	Vascular access: L arm AVF in use    LABS/STUDIES  --------------------------------------------------------------------------------              8.9    9.36  >-----------<  224      [04-10-18 @ 06:35]              28.4     132  |  92  |  54  ----------------------------<  113      [18 @ 18:29]  4.9   |  26  |  5.74        Ca     8.5     [18 18:29]      Mg     2.2     [18 18:29]      Phos  7.6     [18 @ 07:40]      .9 (Ca --)      [18 @ 06:30]   --  .5 (Ca --)      [18 @ 06:00]   --  HbA1c 7.7      [18 @ 09:20]    HBsAb 31.9      [18 @ 16:50]  HBsAg NEGATIVE      [18 @ 16:50]  HBcAb Reactive      [18 @ 16:50]  HCV 0.20, Nonreactive Hepatitis C AB  S/CO Ratio                        Interpretation  < 1.0                                     Non-Reactive  1.0 - 4.9                           Weakly-Reactive  > 5.0                                 Reactive  Non-Reactive: Aperson with a non-reactive HCV antibody  result is considered uninfected.  No further action is  needed unless recent infection is suspected.  In these  cases, consider repeat testing later to detect  seroconversion..  Weakly-Reactive: HCV antibody test is abnormal, HCV RNA  Qualitative test will follow.  Reactive: HCV antibody test is abnormal, HCV RNA  Qualitative test will follow.  Note: HCV antibody testing is performed on the [employer]   system.      [18 @ 16:50]        Radiology  --------------------------------------------------------------------------------    -------------------------------------------------------------------------------Alec Ayala

## 2018-04-10 NOTE — PROGRESS NOTE ADULT - PROVIDER SPECIALTY LIST ADULT
Cardiology
Cardiology
Hospitalist
Nephrology
Nephrology
Ophthalmology
Cardiology
Nephrology
Ophthalmology
Nephrology
Nephrology
Hospitalist
Hospitalist
Nephrology
Hospitalist
Nephrology
Nephrology
Hospitalist
Hospitalist
Nephrology
Hospitalist

## 2018-04-10 NOTE — PROGRESS NOTE ADULT - PROBLEM SELECTOR PROBLEM 1
ESRD (end stage renal disease) on dialysis
Vision loss of left eye
ESRD (end stage renal disease) on dialysis
Vision loss of left eye
ESRD (end stage renal disease) on dialysis
ESRD (end stage renal disease) on dialysis
Toxic encephalopathy
Vision loss of left eye
ESRD (end stage renal disease) on dialysis
Toxic encephalopathy
ESRD (end stage renal disease) on dialysis
Toxic encephalopathy
Toxic encephalopathy
Vision loss of left eye

## 2018-04-10 NOTE — PROGRESS NOTE ADULT - PROBLEM SELECTOR PLAN 1
last HD 4/9 for 3 hours 1.1 L remove w/o complication Extra hd was given for hyperkalemia  Will do 3 hours, low K bath.  Patient seen on HD today.   next HD on 4/12

## 2018-04-10 NOTE — PROGRESS NOTE ADULT - PROBLEM SELECTOR PLAN 1
Folld by opthalmology, s/p AC paracentesis, cont diamox, cont eye drops,  per ophthalmology no urgent need for sx since her IOP was coming down, eventual vitrectomy as outpt, pt need to f/u as outpt. per ophthalmology diamox dose decreased to 250mg bid

## 2018-04-10 NOTE — PROGRESS NOTE ADULT - PROBLEM SELECTOR PROBLEM 6
Diabetes
Need for prophylactic measure
Diabetes
Hypertension
Hypertension
Need for prophylactic measure
Hypertension
Hypertension
Need for prophylactic measure
Hypertension
Need for prophylactic measure
Hypertension

## 2019-02-27 ENCOUNTER — APPOINTMENT (OUTPATIENT)
Age: 39
End: 2019-02-27

## 2020-07-20 NOTE — ED PROVIDER NOTE - PMH
Abscess    Anemia    Back pain    CKD (chronic kidney disease)  creat ~2 as per pt  Diabetes    Glaucoma    Hypertension no

## 2021-08-17 NOTE — ED PROVIDER NOTE - CRITICAL CARE PROVIDED
direct patient care (not related to procedure)/additional history taking/interpretation of diagnostic studies/documentation/consultation with other physicians/consult w/ pt's family directly relating to pts condition
Home

## 2022-06-15 NOTE — ED ADULT NURSE NOTE - TEMPLATE LIST FOR HEAD TO TOE ASSESSMENT
Abdominal Pain, N/V/D [HRA Reviewed] : Health risk assessment reviewed [Independent] : managing finances [No falls in past year] : Patient reported no falls in the past year [Yes] : The patient does have visual impairment

## 2022-08-03 NOTE — ED PROCEDURE NOTE - NS ED ATTENDING STATEMENT MOD
Attending Only Bexarotene Pregnancy And Lactation Text: This medication is Pregnancy Category X and should not be given to women who are pregnant or may become pregnant. This medication should not be used if you are breast feeding.

## 2023-12-02 NOTE — PROGRESS NOTE ADULT - SUBJECTIVE AND OBJECTIVE BOX
CC: Patient is a 37y old  Female who presents with a chief complaint of Hyperkalemia (01 Apr 2018 09:18)    SUBJECTIVE / OVERNIGHT EVENTS: No new complaints, wants to go home. Denies headaches, weakness, malaise, fevers, chills, visual changes, chest pain, dyspnea, cough, abdominal pain, nausea, vomiting, diarrhea, constipation, dysuria, hematuria, polyuria, pruritis, joint pain    MEDICATIONS  (STANDING):  acetazolamide    Tablet 500 milliGRAM(s) Oral two times a day  artificial  tears Solution 1 Drop(s) Left EYE three times a day  atorvastatin 20 milliGRAM(s) Oral at bedtime  brimonidine 0.2% Ophthalmic Solution 1 Drop(s) Left EYE three times a day  calcium acetate 2001 milliGRAM(s) Oral three times a day with meals  dextrose 5%. 1000 milliLiter(s) (50 mL/Hr) IV Continuous <Continuous>  dextrose 50% Injectable 12.5 Gram(s) IV Push once  dextrose 50% Injectable 25 Gram(s) IV Push once  dextrose 50% Injectable 25 Gram(s) IV Push once  docusate sodium 100 milliGRAM(s) Oral three times a day  dorzolamide 2% Ophthalmic Solution 1 Drop(s) Left EYE three times a day  heparin  Injectable 5000 Unit(s) SubCutaneous every 8 hours  insulin glargine Injectable (LANTUS) 9 Unit(s) SubCutaneous at bedtime  insulin lispro (HumaLOG) corrective regimen sliding scale   SubCutaneous three times a day before meals  insulin lispro (HumaLOG) corrective regimen sliding scale   SubCutaneous at bedtime  insulin lispro Injectable (HumaLOG) 15 Unit(s) SubCutaneous three times a day before meals  latanoprost 0.005% Ophthalmic Solution 1 Drop(s) Left EYE at bedtime  metoprolol tartrate 100 milliGRAM(s) Oral two times a day  NIFEdipine XL 30 milliGRAM(s) Oral daily  OXcarbazepine 300 milliGRAM(s) Oral two times a day  pregabalin 100 milliGRAM(s) Oral two times a day  senna 2 Tablet(s) Oral at bedtime  sevelamer hydrochloride 1600 milliGRAM(s) Oral three times a day  timolol 0.5% Solution 1 Drop(s) Left EYE two times a day    MEDICATIONS  (PRN):  acetaminophen   Tablet. 650 milliGRAM(s) Oral every 6 hours PRN mild, moderate pain  dextrose Gel 1 Dose(s) Oral once PRN Blood Glucose LESS THAN 70 milliGRAM(s)/deciliter  glucagon  Injectable 1 milliGRAM(s) IntraMuscular once PRN Glucose LESS THAN 70 milligrams/deciliter  haloperidol     Tablet 2 milliGRAM(s) Oral every 6 hours PRN Agitation  haloperidol    Injectable 2 milliGRAM(s) IV Push every 6 hours PRN Agitation  haloperidol    Injectable 2 milliGRAM(s) IntraMuscular every 6 hours PRN Agitation  oxyCODONE    IR 5 milliGRAM(s) Oral every 6 hours PRN Severe Pain (7 - 10)      Vital Signs Last 24 Hrs  T(C): 36.6 (08 Apr 2018 14:25), Max: 37.1 (08 Apr 2018 06:19)  T(F): 97.8 (08 Apr 2018 14:25), Max: 98.7 (08 Apr 2018 06:19)  HR: 69 (08 Apr 2018 14:25) (65 - 72)  BP: 111/61 (08 Apr 2018 14:25) (111/61 - 136/74)  BP(mean): --  RR: 18 (08 Apr 2018 14:25) (18 - 18)  SpO2: 100% (08 Apr 2018 14:25) (100% - 100%)  CAPILLARY BLOOD GLUCOSE      POCT Blood Glucose.: 108 mg/dL (08 Apr 2018 12:50)  POCT Blood Glucose.: 101 mg/dL (08 Apr 2018 09:03)  POCT Blood Glucose.: 78 mg/dL (07 Apr 2018 22:20)  POCT Blood Glucose.: 122 mg/dL (07 Apr 2018 17:43)        PHYSICAL EXAM:  GENERAL: NAD, well-developed  HEAD:  Atraumatic, Normocephalic  EYES: EOMI, PERRLA, conjunctiva and sclera clear  NECK: Supple, No JVD  CHEST/LUNG: Clear to auscultation bilaterally; No wheeze  HEART: Regular rate and rhythm; No murmurs, rubs, or gallops  ABDOMEN: Soft, Nontender, Nondistended; Bowel sounds present, obese  EXTREMITIES:  2+ Peripheral Pulses, No clubbing, cyanosis, or edema, left leg BKA  PSYCH: AAOx3  NEUROLOGY: non-focal  SKIN: No rashes or lesions    LABS:                        9.6    9.42  )-----------( 237      ( 08 Apr 2018 05:35 )             31.5     04-08    136  |  93<L>  |  85<H>  ----------------------------<  103<H>  5.4<H>   |  26  |  7.21<H>    Ca    8.2<L>      08 Apr 2018 05:35  Phos  6.2     04-08  Mg     2.4     04-08                RADIOLOGY & ADDITIONAL TESTS:    Imaging Personally Reviewed:    Consultant(s) Notes Reviewed:      Care Discussed with Consultants/Other Providers: no

## 2024-03-18 NOTE — DISCHARGE NOTE ADULT - NS MD DC PLAN IMMU FLU REF OTH
[FreeTextEntry1] : Proximal hamstring muscle strain from a sprinting injury last week.  [facility] and MRI pelvis was obtained.  Tendon seems to be intact.  Explained the MRI did not look at the proximal muscle belly but clinically does not seem to be a tear.  Course of physical therapy for presumed muscle strain.  Could consider hip or thigh MRI down the line if no improvement.
Not indicated for this patient

## 2024-04-27 RX ORDER — INSULIN GLARGINE 100 [IU]/ML
9 INJECTION, SOLUTION SUBCUTANEOUS
Qty: 0 | Refills: 0 | DISCHARGE

## 2024-04-27 RX ORDER — INSULIN GLARGINE 100 [IU]/ML
18 INJECTION, SOLUTION SUBCUTANEOUS
Qty: 0 | Refills: 0 | DISCHARGE

## 2024-04-27 RX ORDER — OXYCODONE HYDROCHLORIDE 5 MG/1
1 TABLET ORAL
Qty: 0 | Refills: 0 | DISCHARGE

## 2024-04-27 RX ORDER — INSULIN ASPART 100 [IU]/ML
14 INJECTION, SOLUTION SUBCUTANEOUS
Qty: 0 | Refills: 0 | DISCHARGE

## 2024-04-27 RX ORDER — ZOLPIDEM TARTRATE 10 MG/1
1 TABLET ORAL
Qty: 0 | Refills: 0 | DISCHARGE

## 2024-04-27 RX ORDER — CALCIUM ACETATE 667 MG
3 TABLET ORAL
Qty: 0 | Refills: 0 | DISCHARGE

## 2024-04-27 RX ORDER — ROSUVASTATIN CALCIUM 5 MG/1
1 TABLET ORAL
Qty: 0 | Refills: 0 | DISCHARGE

## 2024-04-27 RX ORDER — METOPROLOL TARTRATE 50 MG
1 TABLET ORAL
Qty: 0 | Refills: 0 | DISCHARGE

## 2024-04-27 RX ORDER — INSULIN ASPART 100 [IU]/ML
15 INJECTION, SOLUTION SUBCUTANEOUS
Qty: 0 | Refills: 0 | DISCHARGE

## 2024-04-27 RX ORDER — INSULIN GLARGINE 100 [IU]/ML
14 INJECTION, SOLUTION SUBCUTANEOUS
Qty: 0 | Refills: 0 | DISCHARGE

## 2024-09-05 NOTE — PROGRESS NOTE ADULT - PROBLEM SELECTOR PLAN 4
Called patient and left message for patient to callback [facility].     Hgb at goal  No indication for JONNY No hematoma, no arrhtyhmias on telemetry, OK to be discharged off AVN blocking agents.

## 2024-10-10 NOTE — DISCHARGE NOTE ADULT - WITHDRAWAL SYMPTOMS INCLUDE; NEGATIVE MOOD, URGES TO SMOKE, AND DIFFICULTY CONCENTRATING.
Blood work looks ok - we have to keep an eye on your kidneys your GFR 57    Stage 3A Moderate CKD (GFR = 45-59 mL/min/1.73 square meters)  Would like to do and US of the kidneys  and check them out lots of water hydrate well please  Vitamin D is low please take 5000 iu daily   Cholesterol is elevated better then last time so watch diet  carbs   Stable diabetic panel    Statement Selected

## 2024-11-07 NOTE — CONSULT NOTE ADULT - SUBJECTIVE AND OBJECTIVE BOX
Occupational Therapy     REHAB Therapy Assessment & Treatment    Patient Name: Mesfin Paiz  MRN: [id number]  Today's Date: 11/7/2024      Attendance:  Participation: Passive participation     Therapeutic Recreation:  Treatment Approach  Approach : Group therapy sessions     Identifying Safe Places group: [id number]  A-Z Coping Skills group: [id number]     2/2 groups attended     Pt a passive participate in both groups, demonstrating ability to appropriately identify goals and desired outcomes with good comprehension with minimal guidance/verbal cues. Pt unable to identify a safe place but able to provide examples of what one would look like. Pt also able to identify several different healthy coping skills to use after discharge, requiring prompting to share and participate.  Pt would benefit from continued OT services in order to improve overall self-esteem, personal confidence and positive supports for safe transition at discharge.        Encounter Problems       Encounter Problems (Active)       OT Goals       Pt will demo increased activity level by attending 8-10 groups per week.  (Progressing)       Start:  10/31/24    Expected End:  11/21/24            Pt will explore/ ID 1-2 meaningful leisure activities to improve QOL for post discharge use.  (Progressing)       Start:  10/31/24    Expected End:  11/21/24            Pt will explore and ID 1-2 strategies to manage stressors/symptoms of illness/ grief more effectively prior to discharge.  (Progressing)       Start:  10/31/24    Expected End:  11/21/24            Patient will attend to therapeutic task for 15 minutes with minimum verbal cues for attention/safety and judgement  (Progressing)       Start:  10/31/24    Expected End:  11/21/24                         Education Documentation  No documentation found.  Education Comments  No comments found.          Additional Comments:      
CHIEF COMPLAINT: altered mental status     HPI:    38yo f pmh left BKA, ESRD, DM, HTN, presents with altered mental status. Pt was called as a rapid response at [facility] today while visiting her daughter. [facility] staff noticed today that she seemed more lethargic and slow to answer questions. Pt unable to answer questions in meaningful way at this time. Pt complaining of abdominal pain.  AOx1. Pt states last HD on Monday.     PAST MEDICAL & SURGICAL HISTORY:  CKD (chronic kidney disease): creat ~2 as per pt  Diabetes  Back pain  Abscess  Anemia  Hypertension  Glaucoma  Abscess of arm, right: and abscess of mons pubis, s/p I and D  History of   Glaucoma  Herniated lumbar intervertebral disc      FAMILY HISTORY:  Family history of renal failure (Sibling)  Family history of diabetes mellitus (DM) (Sibling)      SOCIAL HISTORY:  Smoking: unknown     Allergies : No Known Allergies    HOME MEDICATIONS:  Patient unable to provide,     Per old discharge note 3/15   OXcarbazepine 300 mg oral tablet  -- 1 tab(s) by mouth 2 times a day  -- Indication: For Diabetes    Lyrica 200 mg oral capsule  -- 1 cap(s) by mouth 2 times a day  -- Indication: For Diabetes    FLUoxetine 10 mg oral capsule  -- 1 cap(s) by mouth once a day  -- Indication: For Anxiety    insulin lispro  -- 4 unit(s) subcutaneous 3 times a day before meals  -- Indication: For Diabetes    Lantus 100 units/mL subcutaneous solution  -- 14 unit(s) subcutaneous once a day at 4:30PM  -- Indication: For Diabetes    Ambien 10 mg oral tablet  -- 1 tab(s) by mouth once a day (at bedtime) as needed  -- Indication: For sleep    amLODIPine 10 mg oral tablet  -- 1 tab(s) by mouth once a day  -- Indication: For HTN    furosemide 40 mg oral tablet  -- 1 tab(s) by mouth once a day  -- Indication: For HTN     Atropine Care 1% ophthalmic solution  -- 1 drop(s) to each affected eye 2 times a day  -- Indication: For eyes    HOSPITAL MEDICATIONS:    MEDICATIONS  (PRN):    Pt received Hyper K- cocktail: Calcium Gluconate, Sodium bicarb, insulin + D50, pt also received versed for CT sedation.       REVIEW OF SYSTEMS:  Constitutional:   Eyes:  ENT:  CV:  Resp:  GI: abdominal pain   :  MSK:  Integumentary:  Neurological:  Psychiatric:  Endocrine:  Hematologic/Lymphatic:  Allergic/Immunologic:  [ ] All other systems negative  [X ] Unable to assess ROS because altered mental status   OBJECTIVE:  ICU Vital Signs Last 24 Hrs  T(C): 37 (26 Mar 2018 11:30), Max: 37 (26 Mar 2018 11:30)  T(F): 98.6 (26 Mar 2018 11:30), Max: 98.6 (26 Mar 2018 11:30)  HR: 76 (26 Mar 2018 14:11) (74 - 80)  BP: 195/112 (26 Mar 2018 14:11) (195/112 - 218/123)  BP(mean): --  ABP: --  ABP(mean): --  RR: 16 (26 Mar 2018 14:11) (14 - 18)  SpO2: 99% (26 Mar 2018 14:11) (99% - 100%)        CAPILLARY BLOOD GLUCOSE          PHYSICAL EXAM:  General: ill appearing aox 1  HEENT: wnl   Neck: wnl   Respiratory: rales at bases    Cardiovascular: s1/s2  RRR, no MRG   Abdomen: SNTND   Extremities: L BKA   Skin: wnl   Neurological: Oriented to self ;  CN 2-12 symmetric and intact, slowed speech, confused, mildly obtunded, hand flapping + facial twitch       LABS:  CBC Full  -  (  @ 11:18 )                        11.7 g/dL  36.3 % )-----------( 32.2 %              29.1 pg  Mean Cell Volume : 90.3 fL  Auto Neutrophil # : 13.7 %  Auto Lymphocyte # : 12.77 K/uL  Auto Monocyte # : x  Auto Eosinophil # : x  Auto Basophil # : x        135  |  90<L>  |  90<H>  ----------------------------<  189<H>  7.2<HH>   |  22  |  9.66<H>    Ca    7.9<L>      26 Mar 2018 11:18  Phos  8.4       Mg     2.3         TPro  8.0  /  Alb  4.2  /  TBili  0.3  /  DBili  x   /  AST  12  /  ALT  13  /  AlkPhos  86      PT/INR - ( 26 Mar 2018 11:33 )   PT: 12.5 SEC;   INR: 1.09          PTT - ( 26 Mar 2018 11:33 )  PTT:41.3 SEC    VB.28/55/37/22  Lactate = 1.8      RADIOLOGY:  Xray - no official read yet, bilateral pulmonary edema   CT - No acute intracranial hemorrhage, mass or hydrocephalus. No interval   change.      EK:22 NSR, Rate 66 , , QTc: 471, nml axis, Flipped Ts in v-2   14:58 NSR  Rate 67 ,  QTc: 445 nml axis, flipped Ts resolved.
[facility] DIVISION OF KIDNEY DISEASES AND HYPERTENSION -- INITIAL CONSULT NOTE  --------------------------------------------------------------------------------  HPI: 37 year old female pmh left BKA, ESRD on HD TTS, DM, HTN, admitted with altered mental status found to have hyperkalemia. Pt was called as a rapid response at [facility] today while visiting her daughter. Nephrology consulted for urgent HD in the setting of hyperkalemia of 7.2 with EKG changes. Pt lethargic and answering questions slowly. States she has been on HD for two years and her last outpatient dialysis was on Saturday. Pt unclear on who her outpatient nephrologist is at this time. Pt states she is lives at home with her daughter. Pt seen and examined in ER. Admits to confusion. Denies CP, SOB or LE edema.     PAST HISTORY  --------------------------------------------------------------------------------  PAST MEDICAL & SURGICAL HISTORY:  CKD (chronic kidney disease): creat ~2 as per pt  Diabetes  Back pain  Abscess  Anemia  Hypertension  Glaucoma  Abscess of arm, right: and abscess of mons pubis, s/p I and D  History of   Glaucoma  Herniated lumbar intervertebral disc    FAMILY HISTORY:  Family history of renal failure (Sibling)  Family history of diabetes mellitus (DM) (Sibling)    PAST SOCIAL HISTORY:    ALLERGIES & MEDICATIONS  --------------------------------------------------------------------------------  Allergies    No Known Allergies    Intolerances      Standing Inpatient Medications  amLODIPine   Tablet 10 milliGRAM(s) Oral daily  atorvastatin 20 milliGRAM(s) Oral at bedtime  calcium acetate 2001 milliGRAM(s) Oral three times a day with meals  enalapril 10 milliGRAM(s) Oral two times a day  metoprolol tartrate 100 milliGRAM(s) Oral two times a day  OXcarbazepine 300 milliGRAM(s) Oral two times a day  pregabalin 200 milliGRAM(s) Oral two times a day    PRN Inpatient Medications      REVIEW OF SYSTEMS  --------------------------------------------------------------------------------  Gen: No weakness  Skin: No rashes  Head/Eyes/Ears/Mouth: No headache  Respiratory: No dyspnea  CV: No chest pain, PND, orthopnea  GI: No abdominal pain, diarrhea  MSK: No edema  Neuro: No dizziness/lightheadedness  Heme: No bleeding    All other systems were reviewed and are negative, except as noted.    VITALS/PHYSICAL EXAM  --------------------------------------------------------------------------------  T(C): 36.1 (18 @ 16:35), Max: 37 (18 @ 11:30)  HR: 63 (18 @ 17:00) (63 - 80)  BP: 189/91 (18 @ 17:00) (149/119 - 218/123)  RR: 10 (18 @ 17:00) (10 - 18)  SpO2: 97% (18 @ 17:00) (96% - 100%)  Wt(kg): --    Physical Exam:  	Gen: NAD, lethargic, confused  	HEENT: supple neck, facial twitch noted   	Pulm: CTA B/L  	CV: RRR, S1S2  	Abd: +BS, soft, nontender/nondistended  	UE: Warm, no asterixis  	LE: Warm, no edema  	Skin: Warm, without rashes  	Vascular access: + LUE AVF + thrill + bruit, skin intact     LABS/STUDIES  --------------------------------------------------------------------------------              11.7   12.77 >-----------<  232      [18 11:18]              36.3     135  |  90  |  90  ----------------------------<  189      [18 11:18]  7.2   |  22  |  9.66        Ca     7.9     [18 11:18]      Mg     2.3     [18 11:18]      Phos  8.4     [18 11:18]    TPro  8.0  /  Alb  4.2  /  TBili  0.3  /  DBili  x   /  AST  12  /  ALT  13  /  AlkPhos  86  [18 11:18]    PT/INR: PT 12.5 , INR 1.09       [18 @ 11:33]  PTT: 41.3       [18 11:33]    Troponin 0.15      [18 11:18]        [18 11:18]    Creatinine Trend:  SCr 9.66 [ 11:18]    Urinalysis - [06-18-15 @ 21:42]      Color Yellow / Appearance Clear / SG 1.014 / pH 7.0      Gluc 250 / Ketone Negative  / Bili Negative / Urobili Normal       Blood Small / Protein 300 / Leuk Est Negative / Nitrite Negative      RBC  / WBC  / Hyaline  / Gran  / Sq Epi  / Non Sq Epi  / Bacteria       HbA1c 12.2      [02-25-15 @ 10:35]    HBsAg NEGATIVE      [18 @ 15:40]    C3 Complement 138.0      [03-03-15 @ 04:12]  C4 Complement 36.2      [03-03-15 @ 04:12]

## 2025-02-04 NOTE — CONSULT NOTE ADULT - PROBLEM SELECTOR PROBLEM 1
ESRD (end stage renal disease) on dialysis Photo Preface (Leave Blank If You Do Not Want): Photographs were obtained today

## 2025-04-02 NOTE — ED PROVIDER NOTE - LEFT EYE:     20/
I spoke with the patient about her ultrasound results.  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  Port has been removed.  To be cautious and can restart her on Eliquis and I would like vascular surgery to take a look at her to see if any other intervention needs to be done.  I sent a refill for the 5 mg twice daily dosing of the Eliquis to her pharmacy.  We will keep our regularly scheduled follow-up appointment at the end of the month review her pathology and make a plan going forward.  
Spoke with patient. She called to schedule her appt and was given end of May. Dr. Gonzalez does want her seen within 3 weeks. I informed the patient that I will send them a message and we will touch base to make sure she is seen sooner. Patient verbalized understanding.   
pupil reactive